# Patient Record
Sex: FEMALE | Race: WHITE | Employment: OTHER | URBAN - METROPOLITAN AREA
[De-identification: names, ages, dates, MRNs, and addresses within clinical notes are randomized per-mention and may not be internally consistent; named-entity substitution may affect disease eponyms.]

---

## 2017-01-12 ENCOUNTER — OFFICE VISIT (OUTPATIENT)
Dept: INTERNAL MEDICINE CLINIC | Age: 67
End: 2017-01-12

## 2017-01-12 VITALS
BODY MASS INDEX: 40.82 KG/M2 | TEMPERATURE: 97.9 F | WEIGHT: 221.8 LBS | SYSTOLIC BLOOD PRESSURE: 140 MMHG | HEIGHT: 62 IN | RESPIRATION RATE: 16 BRPM | DIASTOLIC BLOOD PRESSURE: 80 MMHG | HEART RATE: 80 BPM

## 2017-01-12 DIAGNOSIS — E66.01 MORBID OBESITY WITH BMI OF 40.0-44.9, ADULT (HCC): ICD-10-CM

## 2017-01-12 DIAGNOSIS — E03.9 ACQUIRED HYPOTHYROIDISM: ICD-10-CM

## 2017-01-12 DIAGNOSIS — F51.01 PRIMARY INSOMNIA: ICD-10-CM

## 2017-01-12 DIAGNOSIS — B02.29 POST HERPETIC NEURALGIA: Primary | ICD-10-CM

## 2017-01-12 DIAGNOSIS — J43.2 CENTRILOBULAR EMPHYSEMA (HCC): ICD-10-CM

## 2017-01-12 PROCEDURE — 99213 OFFICE O/P EST LOW 20 MIN: CPT | Performed by: INTERNAL MEDICINE

## 2017-01-12 RX ORDER — LEVOTHYROXINE SODIUM 0.1 MG/1
100 TABLET ORAL DAILY
COMMUNITY
Start: 2017-01-02 | End: 2017-03-21 | Stop reason: SDUPTHER

## 2017-01-12 RX ORDER — ZOLPIDEM TARTRATE 10 MG/1
10 TABLET ORAL NIGHTLY
COMMUNITY
Start: 2016-10-19 | End: 2017-03-30 | Stop reason: SDUPTHER

## 2017-01-12 RX ORDER — GABAPENTIN 300 MG/1
300 CAPSULE ORAL 3 TIMES DAILY
Qty: 90 CAPSULE | Refills: 3 | Status: SHIPPED | OUTPATIENT
Start: 2017-01-12 | End: 2017-03-16 | Stop reason: SDUPTHER

## 2017-01-14 ASSESSMENT — ENCOUNTER SYMPTOMS
CONSTIPATION: 0
COUGH: 1
DIARRHEA: 0
CHEST TIGHTNESS: 0
ABDOMINAL PAIN: 0
NAUSEA: 0
SHORTNESS OF BREATH: 1
VOMITING: 0
WHEEZING: 1

## 2017-01-18 ENCOUNTER — TELEPHONE (OUTPATIENT)
Dept: INTERNAL MEDICINE CLINIC | Age: 67
End: 2017-01-18

## 2017-01-18 RX ORDER — GABAPENTIN 300 MG/1
300 CAPSULE ORAL 3 TIMES DAILY
Qty: 90 CAPSULE | Refills: 3 | Status: SHIPPED | OUTPATIENT
Start: 2017-01-18 | End: 2018-02-12 | Stop reason: SDUPTHER

## 2017-02-16 ENCOUNTER — TELEPHONE (OUTPATIENT)
Dept: INTERNAL MEDICINE CLINIC | Age: 67
End: 2017-02-16

## 2017-02-16 RX ORDER — AMOXICILLIN 875 MG/1
875 TABLET, COATED ORAL 2 TIMES DAILY
Qty: 20 TABLET | Refills: 0 | Status: SHIPPED | OUTPATIENT
Start: 2017-02-16 | End: 2017-02-26

## 2017-02-27 ENCOUNTER — TELEPHONE (OUTPATIENT)
Dept: INTERNAL MEDICINE CLINIC | Age: 67
End: 2017-02-27

## 2017-02-27 RX ORDER — PREDNISONE 10 MG/1
TABLET ORAL
Qty: 22 TABLET | Refills: 0 | Status: SHIPPED | OUTPATIENT
Start: 2017-02-27 | End: 2017-03-09

## 2017-02-27 RX ORDER — LEVOFLOXACIN 500 MG/1
500 TABLET, FILM COATED ORAL DAILY
Qty: 10 TABLET | Refills: 0 | Status: SHIPPED | OUTPATIENT
Start: 2017-02-27 | End: 2017-03-09

## 2017-03-16 ENCOUNTER — OFFICE VISIT (OUTPATIENT)
Dept: INTERNAL MEDICINE CLINIC | Age: 67
End: 2017-03-16

## 2017-03-16 ENCOUNTER — TELEPHONE (OUTPATIENT)
Dept: INTERNAL MEDICINE CLINIC | Age: 67
End: 2017-03-16

## 2017-03-16 VITALS
SYSTOLIC BLOOD PRESSURE: 150 MMHG | BODY MASS INDEX: 40.26 KG/M2 | OXYGEN SATURATION: 93 % | RESPIRATION RATE: 18 BRPM | DIASTOLIC BLOOD PRESSURE: 82 MMHG | TEMPERATURE: 97.7 F | WEIGHT: 216.6 LBS | HEART RATE: 88 BPM

## 2017-03-16 DIAGNOSIS — R45.4 IRRITABILITY: ICD-10-CM

## 2017-03-16 DIAGNOSIS — J44.1 COPD WITH ACUTE EXACERBATION (HCC): Primary | ICD-10-CM

## 2017-03-16 DIAGNOSIS — E05.00 GRAVES DISEASE: ICD-10-CM

## 2017-03-16 DIAGNOSIS — I10 ESSENTIAL HYPERTENSION: ICD-10-CM

## 2017-03-16 DIAGNOSIS — J18.9 PNEUMONIA OF BOTH LUNGS DUE TO INFECTIOUS ORGANISM, UNSPECIFIED PART OF LUNG: ICD-10-CM

## 2017-03-16 LAB
ANION GAP SERPL CALCULATED.3IONS-SCNC: 13 MMOL/L (ref 3–16)
BASOPHILS ABSOLUTE: 0.1 K/UL (ref 0–0.2)
BASOPHILS RELATIVE PERCENT: 0.7 %
BUN BLDV-MCNC: 13 MG/DL (ref 7–20)
CALCIUM SERPL-MCNC: 9.2 MG/DL (ref 8.3–10.6)
CHLORIDE BLD-SCNC: 96 MMOL/L (ref 99–110)
CO2: 31 MMOL/L (ref 21–32)
CREAT SERPL-MCNC: 0.6 MG/DL (ref 0.6–1.2)
EOSINOPHILS ABSOLUTE: 0 K/UL (ref 0–0.6)
EOSINOPHILS RELATIVE PERCENT: 0.2 %
GFR AFRICAN AMERICAN: >60
GFR NON-AFRICAN AMERICAN: >60
GLUCOSE BLD-MCNC: 75 MG/DL (ref 70–99)
HCT VFR BLD CALC: 45.6 % (ref 36–48)
HEMOGLOBIN: 14.3 G/DL (ref 12–16)
LYMPHOCYTES ABSOLUTE: 4.3 K/UL (ref 1–5.1)
LYMPHOCYTES RELATIVE PERCENT: 36.1 %
MCH RBC QN AUTO: 26.6 PG (ref 26–34)
MCHC RBC AUTO-ENTMCNC: 31.4 G/DL (ref 31–36)
MCV RBC AUTO: 84.7 FL (ref 80–100)
MONOCYTES ABSOLUTE: 0.9 K/UL (ref 0–1.3)
MONOCYTES RELATIVE PERCENT: 7.8 %
NEUTROPHILS ABSOLUTE: 6.6 K/UL (ref 1.7–7.7)
NEUTROPHILS RELATIVE PERCENT: 55.2 %
PDW BLD-RTO: 15.3 % (ref 12.4–15.4)
PLATELET # BLD: 307 K/UL (ref 135–450)
PMV BLD AUTO: 7.9 FL (ref 5–10.5)
POTASSIUM SERPL-SCNC: 3.4 MMOL/L (ref 3.5–5.1)
RBC # BLD: 5.38 M/UL (ref 4–5.2)
SODIUM BLD-SCNC: 140 MMOL/L (ref 136–145)
THEOPHYLLINE LEVEL: 8.8 UG/ML (ref 8–20)
WBC # BLD: 12 K/UL (ref 4–11)

## 2017-03-16 PROCEDURE — 36415 COLL VENOUS BLD VENIPUNCTURE: CPT | Performed by: INTERNAL MEDICINE

## 2017-03-16 PROCEDURE — 99214 OFFICE O/P EST MOD 30 MIN: CPT | Performed by: INTERNAL MEDICINE

## 2017-03-16 RX ORDER — PREDNISONE 10 MG/1
10 TABLET ORAL DAILY
COMMUNITY
Start: 2017-03-07 | End: 2017-03-16 | Stop reason: DRUGHIGH

## 2017-03-16 RX ORDER — PREDNISONE 10 MG/1
TABLET ORAL
Qty: 22 TABLET | Refills: 0 | Status: SHIPPED | OUTPATIENT
Start: 2017-03-16 | End: 2017-03-26

## 2017-03-16 RX ORDER — CLINDAMYCIN HYDROCHLORIDE 300 MG/1
300 CAPSULE ORAL 2 TIMES DAILY
Qty: 14 CAPSULE | Refills: 0 | Status: SHIPPED | OUTPATIENT
Start: 2017-03-16 | End: 2017-03-23

## 2017-03-16 RX ORDER — DOXYCYCLINE HYCLATE 100 MG
100 TABLET ORAL 2 TIMES DAILY
Qty: 20 TABLET | Refills: 0 | Status: SHIPPED | OUTPATIENT
Start: 2017-03-16 | End: 2018-08-27 | Stop reason: SDUPTHER

## 2017-03-16 RX ORDER — ALPRAZOLAM 0.25 MG/1
0.25 TABLET ORAL 3 TIMES DAILY PRN
Qty: 30 TABLET | Refills: 0 | Status: SHIPPED | OUTPATIENT
Start: 2017-03-16 | End: 2017-04-19 | Stop reason: SDUPTHER

## 2017-03-19 PROBLEM — I10 ESSENTIAL HYPERTENSION: Status: ACTIVE | Noted: 2017-03-19

## 2017-03-19 ASSESSMENT — ENCOUNTER SYMPTOMS
CHEST TIGHTNESS: 1
NAUSEA: 0
WHEEZING: 1
COUGH: 1
CONSTIPATION: 0
ABDOMINAL PAIN: 0
VOMITING: 0
SHORTNESS OF BREATH: 1
DIARRHEA: 0

## 2017-03-21 DIAGNOSIS — E03.9 ACQUIRED HYPOTHYROIDISM: ICD-10-CM

## 2017-03-21 RX ORDER — LEVOTHYROXINE SODIUM 0.1 MG/1
TABLET ORAL
Qty: 90 TABLET | Refills: 1 | Status: SHIPPED | OUTPATIENT
Start: 2017-03-21 | End: 2017-09-08 | Stop reason: SDUPTHER

## 2017-03-30 ENCOUNTER — OFFICE VISIT (OUTPATIENT)
Dept: INTERNAL MEDICINE CLINIC | Age: 67
End: 2017-03-30

## 2017-03-30 VITALS
RESPIRATION RATE: 16 BRPM | WEIGHT: 213.4 LBS | OXYGEN SATURATION: 97 % | BODY MASS INDEX: 39.67 KG/M2 | DIASTOLIC BLOOD PRESSURE: 70 MMHG | HEART RATE: 88 BPM | SYSTOLIC BLOOD PRESSURE: 134 MMHG | TEMPERATURE: 97.9 F

## 2017-03-30 DIAGNOSIS — K75.81 STEATOHEPATITIS, NON-ALCOHOLIC: ICD-10-CM

## 2017-03-30 DIAGNOSIS — E03.9 ACQUIRED HYPOTHYROIDISM: ICD-10-CM

## 2017-03-30 DIAGNOSIS — J44.1 COPD WITH EXACERBATION (HCC): Primary | ICD-10-CM

## 2017-03-30 DIAGNOSIS — E09.9 STEROID-INDUCED DIABETES (HCC): ICD-10-CM

## 2017-03-30 DIAGNOSIS — T38.0X5A STEROID-INDUCED DIABETES (HCC): ICD-10-CM

## 2017-03-30 DIAGNOSIS — F51.01 PRIMARY INSOMNIA: ICD-10-CM

## 2017-03-30 LAB
A/G RATIO: 1.6 (ref 1.1–2.2)
ALBUMIN SERPL-MCNC: 3.9 G/DL (ref 3.4–5)
ALP BLD-CCNC: 73 U/L (ref 40–129)
ALT SERPL-CCNC: 20 U/L (ref 10–40)
ANION GAP SERPL CALCULATED.3IONS-SCNC: 14 MMOL/L (ref 3–16)
AST SERPL-CCNC: 19 U/L (ref 15–37)
BILIRUB SERPL-MCNC: 0.7 MG/DL (ref 0–1)
BUN BLDV-MCNC: 8 MG/DL (ref 7–20)
CALCIUM SERPL-MCNC: 9.2 MG/DL (ref 8.3–10.6)
CHLORIDE BLD-SCNC: 99 MMOL/L (ref 99–110)
CO2: 27 MMOL/L (ref 21–32)
CREAT SERPL-MCNC: 0.6 MG/DL (ref 0.6–1.2)
GFR AFRICAN AMERICAN: >60
GFR NON-AFRICAN AMERICAN: >60
GLOBULIN: 2.4 G/DL
GLUCOSE BLD-MCNC: 88 MG/DL (ref 70–99)
POTASSIUM SERPL-SCNC: 4.2 MMOL/L (ref 3.5–5.1)
SODIUM BLD-SCNC: 140 MMOL/L (ref 136–145)
T4 FREE: 1.5 NG/DL (ref 0.9–1.8)
TOTAL PROTEIN: 6.3 G/DL (ref 6.4–8.2)
TSH SERPL DL<=0.05 MIU/L-ACNC: 0.77 UIU/ML (ref 0.27–4.2)

## 2017-03-30 PROCEDURE — 99214 OFFICE O/P EST MOD 30 MIN: CPT | Performed by: INTERNAL MEDICINE

## 2017-03-30 PROCEDURE — 36415 COLL VENOUS BLD VENIPUNCTURE: CPT | Performed by: INTERNAL MEDICINE

## 2017-03-30 RX ORDER — ZOLPIDEM TARTRATE 10 MG/1
10 TABLET ORAL NIGHTLY
Qty: 90 TABLET | Refills: 0 | Status: SHIPPED | OUTPATIENT
Start: 2017-03-30 | End: 2017-06-22 | Stop reason: SDUPTHER

## 2017-03-30 RX ORDER — THEOPHYLLINE 300 MG/1
300 TABLET, EXTENDED RELEASE ORAL 2 TIMES DAILY
COMMUNITY
Start: 2017-03-14 | End: 2021-11-27

## 2017-03-30 RX ORDER — ZOLPIDEM TARTRATE 10 MG/1
10 TABLET ORAL NIGHTLY
Qty: 30 TABLET | Refills: 0 | Status: SHIPPED | OUTPATIENT
Start: 2017-03-30 | End: 2017-05-25 | Stop reason: CLARIF

## 2017-03-30 RX ORDER — PREDNISONE 10 MG/1
10 TABLET ORAL EVERY OTHER DAY
COMMUNITY
Start: 2017-03-16 | End: 2017-05-25 | Stop reason: ALTCHOICE

## 2017-04-02 ASSESSMENT — ENCOUNTER SYMPTOMS
VOMITING: 0
CHEST TIGHTNESS: 1
ABDOMINAL PAIN: 0
COUGH: 1
DIARRHEA: 0
NAUSEA: 0
CONSTIPATION: 0
SHORTNESS OF BREATH: 1
WHEEZING: 1

## 2017-04-18 ENCOUNTER — TELEPHONE (OUTPATIENT)
Dept: INTERNAL MEDICINE CLINIC | Age: 67
End: 2017-04-18

## 2017-04-18 DIAGNOSIS — R45.4 IRRITABILITY: ICD-10-CM

## 2017-04-19 RX ORDER — ALPRAZOLAM 0.25 MG/1
0.25 TABLET ORAL 3 TIMES DAILY PRN
Qty: 30 TABLET | Refills: 0 | Status: SHIPPED | OUTPATIENT
Start: 2017-04-19 | End: 2017-05-19

## 2017-05-08 ENCOUNTER — TELEPHONE (OUTPATIENT)
Dept: INTERNAL MEDICINE CLINIC | Age: 67
End: 2017-05-08

## 2017-05-08 RX ORDER — AMOXICILLIN 875 MG/1
875 TABLET, COATED ORAL 2 TIMES DAILY
Qty: 20 TABLET | Refills: 0 | Status: SHIPPED | OUTPATIENT
Start: 2017-05-08 | End: 2017-05-25 | Stop reason: ALTCHOICE

## 2017-05-25 ENCOUNTER — OFFICE VISIT (OUTPATIENT)
Dept: INTERNAL MEDICINE CLINIC | Age: 67
End: 2017-05-25

## 2017-05-25 VITALS
TEMPERATURE: 97.9 F | DIASTOLIC BLOOD PRESSURE: 72 MMHG | HEART RATE: 100 BPM | BODY MASS INDEX: 39.82 KG/M2 | OXYGEN SATURATION: 94 % | WEIGHT: 214.2 LBS | SYSTOLIC BLOOD PRESSURE: 136 MMHG | RESPIRATION RATE: 16 BRPM

## 2017-05-25 DIAGNOSIS — F41.9 ANXIETY AND DEPRESSION: ICD-10-CM

## 2017-05-25 DIAGNOSIS — J44.9 CHRONIC OBSTRUCTIVE PULMONARY DISEASE, UNSPECIFIED COPD TYPE (HCC): Primary | ICD-10-CM

## 2017-05-25 DIAGNOSIS — F32.A ANXIETY AND DEPRESSION: ICD-10-CM

## 2017-05-25 DIAGNOSIS — J30.2 SEASONAL ALLERGIC RHINITIS, UNSPECIFIED ALLERGIC RHINITIS TRIGGER: ICD-10-CM

## 2017-05-25 PROCEDURE — 99213 OFFICE O/P EST LOW 20 MIN: CPT | Performed by: INTERNAL MEDICINE

## 2017-05-25 RX ORDER — CITALOPRAM 20 MG/1
20 TABLET ORAL DAILY
Qty: 90 TABLET | Refills: 1 | Status: SHIPPED | OUTPATIENT
Start: 2017-05-25 | End: 2017-05-26 | Stop reason: SDUPTHER

## 2017-05-26 RX ORDER — CITALOPRAM 20 MG/1
20 TABLET ORAL DAILY
Qty: 90 TABLET | Refills: 1 | Status: SHIPPED | OUTPATIENT
Start: 2017-05-26 | End: 2018-01-18 | Stop reason: SDUPTHER

## 2017-06-02 ASSESSMENT — ENCOUNTER SYMPTOMS
DIARRHEA: 0
VOMITING: 0
CONSTIPATION: 0
ABDOMINAL PAIN: 0
SHORTNESS OF BREATH: 1
CHEST TIGHTNESS: 1
NAUSEA: 0
WHEEZING: 1
COUGH: 1

## 2017-06-09 ENCOUNTER — TELEPHONE (OUTPATIENT)
Dept: INTERNAL MEDICINE CLINIC | Age: 67
End: 2017-06-09

## 2017-06-12 ENCOUNTER — OFFICE VISIT (OUTPATIENT)
Dept: INTERNAL MEDICINE CLINIC | Age: 67
End: 2017-06-12

## 2017-06-12 VITALS
DIASTOLIC BLOOD PRESSURE: 72 MMHG | WEIGHT: 213.4 LBS | RESPIRATION RATE: 16 BRPM | OXYGEN SATURATION: 98 % | BODY MASS INDEX: 39.67 KG/M2 | SYSTOLIC BLOOD PRESSURE: 132 MMHG | HEART RATE: 92 BPM | TEMPERATURE: 97.6 F

## 2017-06-12 DIAGNOSIS — R42 VERTIGO: Primary | ICD-10-CM

## 2017-06-12 DIAGNOSIS — I10 ESSENTIAL HYPERTENSION: ICD-10-CM

## 2017-06-12 DIAGNOSIS — J44.9 CHRONIC OBSTRUCTIVE PULMONARY DISEASE, UNSPECIFIED COPD TYPE (HCC): ICD-10-CM

## 2017-06-12 DIAGNOSIS — H65.04 RECURRENT ACUTE SEROUS OTITIS MEDIA OF RIGHT EAR: ICD-10-CM

## 2017-06-12 PROCEDURE — 99213 OFFICE O/P EST LOW 20 MIN: CPT | Performed by: INTERNAL MEDICINE

## 2017-06-12 RX ORDER — AMOXICILLIN 875 MG/1
875 TABLET, COATED ORAL 2 TIMES DAILY
Qty: 20 TABLET | Refills: 0 | Status: SHIPPED | OUTPATIENT
Start: 2017-06-12 | End: 2017-06-22

## 2017-06-12 RX ORDER — FEXOFENADINE HCL 180 MG/1
180 TABLET ORAL DAILY
Qty: 30 TABLET | Refills: 1 | Status: SHIPPED | COMMUNITY
Start: 2017-06-12 | End: 2018-08-27 | Stop reason: SDUPTHER

## 2017-06-13 ASSESSMENT — ENCOUNTER SYMPTOMS
RHINORRHEA: 1
SINUS PRESSURE: 1
COUGH: 1
SHORTNESS OF BREATH: 1

## 2017-06-19 ENCOUNTER — TELEPHONE (OUTPATIENT)
Dept: INTERNAL MEDICINE CLINIC | Age: 67
End: 2017-06-19

## 2017-06-19 RX ORDER — LEVOFLOXACIN 750 MG/1
750 TABLET ORAL DAILY
Qty: 5 TABLET | Refills: 1 | Status: SHIPPED | OUTPATIENT
Start: 2017-06-19 | End: 2017-07-27 | Stop reason: ALTCHOICE

## 2017-06-22 DIAGNOSIS — F51.01 PRIMARY INSOMNIA: ICD-10-CM

## 2017-06-22 RX ORDER — ZOLPIDEM TARTRATE 10 MG/1
10 TABLET ORAL NIGHTLY
Qty: 90 TABLET | Refills: 0 | Status: SHIPPED | OUTPATIENT
Start: 2017-06-22 | End: 2017-09-19 | Stop reason: SDUPTHER

## 2017-06-29 ENCOUNTER — OFFICE VISIT (OUTPATIENT)
Dept: INTERNAL MEDICINE CLINIC | Age: 67
End: 2017-06-29

## 2017-06-29 VITALS
OXYGEN SATURATION: 91 % | DIASTOLIC BLOOD PRESSURE: 80 MMHG | BODY MASS INDEX: 38.83 KG/M2 | WEIGHT: 211 LBS | HEIGHT: 62 IN | TEMPERATURE: 98.4 F | HEART RATE: 91 BPM | SYSTOLIC BLOOD PRESSURE: 130 MMHG

## 2017-06-29 DIAGNOSIS — J44.9 CHRONIC OBSTRUCTIVE PULMONARY DISEASE, UNSPECIFIED COPD TYPE (HCC): Primary | ICD-10-CM

## 2017-06-29 DIAGNOSIS — J30.2 SEASONAL ALLERGIC RHINITIS, UNSPECIFIED ALLERGIC RHINITIS TRIGGER: ICD-10-CM

## 2017-06-29 PROCEDURE — 99213 OFFICE O/P EST LOW 20 MIN: CPT | Performed by: INTERNAL MEDICINE

## 2017-06-29 RX ORDER — PREDNISONE 10 MG/1
TABLET ORAL
Qty: 22 TABLET | Refills: 0 | Status: SHIPPED | OUTPATIENT
Start: 2017-06-29 | End: 2017-07-09

## 2017-07-02 ASSESSMENT — ENCOUNTER SYMPTOMS
NAUSEA: 0
SORE THROAT: 1
RHINORRHEA: 1
COUGH: 1
WHEEZING: 1
SINUS PRESSURE: 1
SHORTNESS OF BREATH: 1
ABDOMINAL PAIN: 0
VOMITING: 0
DIARRHEA: 0

## 2017-07-19 ENCOUNTER — TELEPHONE (OUTPATIENT)
Dept: INTERNAL MEDICINE CLINIC | Age: 67
End: 2017-07-19

## 2017-07-19 RX ORDER — AMOXICILLIN 875 MG/1
875 TABLET, COATED ORAL 2 TIMES DAILY
Qty: 20 TABLET | Refills: 0 | Status: SHIPPED | OUTPATIENT
Start: 2017-07-19 | End: 2017-09-19 | Stop reason: ALTCHOICE

## 2017-07-19 RX ORDER — PREDNISONE 10 MG/1
TABLET ORAL
Qty: 22 TABLET | Refills: 0 | Status: SHIPPED | OUTPATIENT
Start: 2017-07-19 | End: 2017-07-29

## 2017-07-27 ENCOUNTER — OFFICE VISIT (OUTPATIENT)
Dept: INTERNAL MEDICINE CLINIC | Age: 67
End: 2017-07-27

## 2017-07-27 VITALS
HEIGHT: 61 IN | HEART RATE: 81 BPM | TEMPERATURE: 98 F | DIASTOLIC BLOOD PRESSURE: 70 MMHG | BODY MASS INDEX: 39.46 KG/M2 | SYSTOLIC BLOOD PRESSURE: 130 MMHG | WEIGHT: 209 LBS

## 2017-07-27 DIAGNOSIS — H53.2 DIPLOPIA: ICD-10-CM

## 2017-07-27 DIAGNOSIS — I10 ESSENTIAL HYPERTENSION: ICD-10-CM

## 2017-07-27 DIAGNOSIS — Z01.818 PRE-OP EXAM: Primary | ICD-10-CM

## 2017-07-27 DIAGNOSIS — E05.00 GRAVES DISEASE: ICD-10-CM

## 2017-07-27 DIAGNOSIS — Z13.220 SCREENING CHOLESTEROL LEVEL: ICD-10-CM

## 2017-07-27 DIAGNOSIS — J44.9 COPD, SEVERE (HCC): ICD-10-CM

## 2017-07-27 LAB
A/G RATIO: 1.5 (ref 1.1–2.2)
ALBUMIN SERPL-MCNC: 4.2 G/DL (ref 3.4–5)
ALP BLD-CCNC: 74 U/L (ref 40–129)
ALT SERPL-CCNC: 18 U/L (ref 10–40)
ANION GAP SERPL CALCULATED.3IONS-SCNC: 15 MMOL/L (ref 3–16)
AST SERPL-CCNC: 18 U/L (ref 15–37)
BILIRUB SERPL-MCNC: 0.6 MG/DL (ref 0–1)
BUN BLDV-MCNC: 12 MG/DL (ref 7–20)
CALCIUM SERPL-MCNC: 9.6 MG/DL (ref 8.3–10.6)
CHLORIDE BLD-SCNC: 100 MMOL/L (ref 99–110)
CHOLESTEROL, TOTAL: 216 MG/DL (ref 0–199)
CO2: 27 MMOL/L (ref 21–32)
CREAT SERPL-MCNC: 0.6 MG/DL (ref 0.6–1.2)
GFR AFRICAN AMERICAN: >60
GFR NON-AFRICAN AMERICAN: >60
GLOBULIN: 2.8 G/DL
GLUCOSE BLD-MCNC: 77 MG/DL (ref 70–99)
HCT VFR BLD CALC: 47.8 % (ref 36–48)
HDLC SERPL-MCNC: 79 MG/DL (ref 40–60)
HEMOGLOBIN: 15.3 G/DL (ref 12–16)
LDL CHOLESTEROL CALCULATED: 105 MG/DL
MCH RBC QN AUTO: 28.1 PG (ref 26–34)
MCHC RBC AUTO-ENTMCNC: 32.1 G/DL (ref 31–36)
MCV RBC AUTO: 87.5 FL (ref 80–100)
PDW BLD-RTO: 14.9 % (ref 12.4–15.4)
PLATELET # BLD: 316 K/UL (ref 135–450)
PMV BLD AUTO: 8.6 FL (ref 5–10.5)
POTASSIUM SERPL-SCNC: 4.2 MMOL/L (ref 3.5–5.1)
RBC # BLD: 5.46 M/UL (ref 4–5.2)
SODIUM BLD-SCNC: 142 MMOL/L (ref 136–145)
THEOPHYLLINE LEVEL: 5.7 UG/ML (ref 8–20)
TOTAL PROTEIN: 7 G/DL (ref 6.4–8.2)
TRIGL SERPL-MCNC: 161 MG/DL (ref 0–150)
VLDLC SERPL CALC-MCNC: 32 MG/DL
WBC # BLD: 12.9 K/UL (ref 4–11)

## 2017-07-27 PROCEDURE — 93000 ELECTROCARDIOGRAM COMPLETE: CPT | Performed by: INTERNAL MEDICINE

## 2017-07-27 PROCEDURE — 99213 OFFICE O/P EST LOW 20 MIN: CPT | Performed by: INTERNAL MEDICINE

## 2017-07-27 PROCEDURE — 36415 COLL VENOUS BLD VENIPUNCTURE: CPT | Performed by: INTERNAL MEDICINE

## 2017-07-27 ASSESSMENT — ENCOUNTER SYMPTOMS
ABDOMINAL PAIN: 0
EYE DISCHARGE: 0
APNEA: 0
VOMITING: 0
BLOOD IN STOOL: 0
EYE REDNESS: 0
PHOTOPHOBIA: 0
EYE PAIN: 0
NAUSEA: 0
TROUBLE SWALLOWING: 0
CONSTIPATION: 0
EYE ITCHING: 0
WHEEZING: 1
DIARRHEA: 0
BACK PAIN: 0
CHEST TIGHTNESS: 0
COUGH: 1
SHORTNESS OF BREATH: 1

## 2017-07-28 ENCOUNTER — TELEPHONE (OUTPATIENT)
Dept: INTERNAL MEDICINE CLINIC | Age: 67
End: 2017-07-28

## 2017-08-01 ENCOUNTER — TELEPHONE (OUTPATIENT)
Dept: INTERNAL MEDICINE CLINIC | Age: 67
End: 2017-08-01

## 2017-09-08 DIAGNOSIS — E03.9 ACQUIRED HYPOTHYROIDISM: ICD-10-CM

## 2017-09-11 RX ORDER — LEVOTHYROXINE SODIUM 0.1 MG/1
TABLET ORAL
Qty: 90 TABLET | Refills: 1 | Status: SHIPPED | OUTPATIENT
Start: 2017-09-11 | End: 2018-03-12 | Stop reason: SDUPTHER

## 2017-09-18 ENCOUNTER — TELEPHONE (OUTPATIENT)
Dept: INTERNAL MEDICINE CLINIC | Age: 67
End: 2017-09-18

## 2017-09-18 RX ORDER — PREDNISONE 20 MG/1
TABLET ORAL
Qty: 20 TABLET | Refills: 0 | Status: SHIPPED | OUTPATIENT
Start: 2017-09-18 | End: 2017-09-25 | Stop reason: DRUGHIGH

## 2017-09-19 ENCOUNTER — OFFICE VISIT (OUTPATIENT)
Dept: INTERNAL MEDICINE CLINIC | Age: 67
End: 2017-09-19

## 2017-09-19 VITALS
OXYGEN SATURATION: 93 % | WEIGHT: 208 LBS | SYSTOLIC BLOOD PRESSURE: 106 MMHG | DIASTOLIC BLOOD PRESSURE: 60 MMHG | HEART RATE: 92 BPM | BODY MASS INDEX: 38.98 KG/M2 | TEMPERATURE: 97.8 F | RESPIRATION RATE: 24 BRPM

## 2017-09-19 DIAGNOSIS — F51.01 PRIMARY INSOMNIA: ICD-10-CM

## 2017-09-19 DIAGNOSIS — J44.1 COPD EXACERBATION (HCC): Primary | ICD-10-CM

## 2017-09-19 PROCEDURE — 99213 OFFICE O/P EST LOW 20 MIN: CPT | Performed by: INTERNAL MEDICINE

## 2017-09-19 RX ORDER — ZOLPIDEM TARTRATE 10 MG/1
10 TABLET ORAL NIGHTLY
Qty: 90 TABLET | Refills: 0 | Status: SHIPPED | OUTPATIENT
Start: 2017-09-19 | End: 2017-12-21 | Stop reason: SDUPTHER

## 2017-09-19 RX ORDER — ALPRAZOLAM 0.25 MG/1
0.25 TABLET ORAL 2 TIMES DAILY PRN
Qty: 20 TABLET | Refills: 0 | Status: SHIPPED | OUTPATIENT
Start: 2017-09-19 | End: 2018-01-18 | Stop reason: SDUPTHER

## 2017-09-19 RX ORDER — LEVOFLOXACIN 500 MG/1
500 TABLET, FILM COATED ORAL DAILY
Qty: 10 TABLET | Refills: 0 | Status: SHIPPED | OUTPATIENT
Start: 2017-09-19 | End: 2017-09-29

## 2017-09-20 ASSESSMENT — ENCOUNTER SYMPTOMS
NAUSEA: 0
CONSTIPATION: 0
SHORTNESS OF BREATH: 1
ABDOMINAL PAIN: 0
WHEEZING: 1
COUGH: 1
VOMITING: 0
DIARRHEA: 0

## 2017-09-25 ENCOUNTER — TELEPHONE (OUTPATIENT)
Dept: INTERNAL MEDICINE CLINIC | Age: 67
End: 2017-09-25

## 2017-09-25 RX ORDER — PREDNISONE 20 MG/1
TABLET ORAL
Qty: 20 TABLET | Refills: 0 | Status: SHIPPED | OUTPATIENT
Start: 2017-09-25 | End: 2018-01-08 | Stop reason: ALTCHOICE

## 2017-09-25 NOTE — TELEPHONE ENCOUNTER
Pt was prescribed prednisone and she only has 1 pill left. Pt feeling a little better but she still has a cough. Pt is still taking the levaquin. Pt wants to know if a refill of prednisone can [facility] in [place].

## 2017-09-25 NOTE — TELEPHONE ENCOUNTER
Prescription sent in and will continue taper. 2 due to pills daily for 5 days one pill daily for 5 days and a half pill daily for 4-5 days.  If more trouble to call us

## 2017-12-21 ENCOUNTER — TELEPHONE (OUTPATIENT)
Dept: INTERNAL MEDICINE CLINIC | Age: 67
End: 2017-12-21

## 2017-12-21 DIAGNOSIS — F51.01 PRIMARY INSOMNIA: ICD-10-CM

## 2017-12-21 RX ORDER — ZOLPIDEM TARTRATE 10 MG/1
10 TABLET ORAL NIGHTLY
Qty: 90 TABLET | Refills: 0 | Status: SHIPPED | OUTPATIENT
Start: 2017-12-21 | End: 2018-03-12 | Stop reason: SDUPTHER

## 2017-12-21 NOTE — TELEPHONE ENCOUNTER
Prescriptions sent to her mail order however before refills again would be due for a visit and should probably schedule

## 2018-01-03 ENCOUNTER — TELEPHONE (OUTPATIENT)
Dept: INTERNAL MEDICINE CLINIC | Age: 68
End: 2018-01-03

## 2018-01-03 DIAGNOSIS — R09.89 CHEST CONGESTION: Primary | ICD-10-CM

## 2018-01-03 DIAGNOSIS — R50.9 FEVER, UNSPECIFIED FEVER CAUSE: ICD-10-CM

## 2018-01-03 DIAGNOSIS — R05.9 COUGH: ICD-10-CM

## 2018-01-03 RX ORDER — AMOXICILLIN AND CLAVULANATE POTASSIUM 875; 125 MG/1; MG/1
1 TABLET, FILM COATED ORAL 2 TIMES DAILY
Qty: 20 TABLET | Refills: 0 | Status: SHIPPED | OUTPATIENT
Start: 2018-01-03 | End: 2018-01-13

## 2018-01-03 RX ORDER — OSELTAMIVIR PHOSPHATE 75 MG/1
75 CAPSULE ORAL 2 TIMES DAILY
Qty: 10 CAPSULE | Refills: 0 | Status: SHIPPED | OUTPATIENT
Start: 2018-01-03 | End: 2018-01-13

## 2018-01-03 NOTE — TELEPHONE ENCOUNTER
Breathing is fair, using nebulizer, fever of 99, coughing a lot, she was around her son on Sun and on Monday he was dx with the Flu BHerberth Mauro is asking if you could send in prednisone for her also.

## 2018-01-04 ENCOUNTER — TELEPHONE (OUTPATIENT)
Dept: INTERNAL MEDICINE CLINIC | Age: 68
End: 2018-01-04

## 2018-01-04 DIAGNOSIS — R50.9 FEVER, UNSPECIFIED FEVER CAUSE: ICD-10-CM

## 2018-01-04 DIAGNOSIS — R09.89 CHEST CONGESTION: ICD-10-CM

## 2018-01-04 DIAGNOSIS — R05.9 COUGH: ICD-10-CM

## 2018-01-04 NOTE — TELEPHONE ENCOUNTER
Spoke with the patient and she state that she's feeling drained, sob,has a headache, sore throat, coughing, is using her nebulizer. Sleeping in a recliner, does better when not lying flat. Taking the tamiflu and augmentin.

## 2018-01-08 ENCOUNTER — TELEPHONE (OUTPATIENT)
Dept: INTERNAL MEDICINE CLINIC | Age: 68
End: 2018-01-08

## 2018-01-08 RX ORDER — PREDNISONE 20 MG/1
20 TABLET ORAL DAILY
Qty: 10 TABLET | Refills: 0 | Status: SHIPPED | OUTPATIENT
Start: 2018-01-08 | End: 2018-01-18 | Stop reason: ALTCHOICE

## 2018-01-08 RX ORDER — LEVOFLOXACIN 500 MG/1
500 TABLET, FILM COATED ORAL DAILY
Qty: 10 TABLET | Refills: 0 | Status: SHIPPED | OUTPATIENT
Start: 2018-01-08 | End: 2018-01-18 | Stop reason: ALTCHOICE

## 2018-01-08 NOTE — TELEPHONE ENCOUNTER
Pt has a fever,body aches,hard time breathing,dizzy,no energy and cough. Pt is using his nebulizer. Pt is on augmentin but Bhakti Peralta wants to know if more meds can be sent to [address] [facility] in [place]. The tamiflu is too expensive.

## 2018-01-08 NOTE — TELEPHONE ENCOUNTER
Levaquin and steroids sent in. If persisting problems see. With lung disease if bad to go to the ER.  Probably too late for viral treatment anyway

## 2018-01-15 ENCOUNTER — TELEPHONE (OUTPATIENT)
Dept: INTERNAL MEDICINE CLINIC | Age: 68
End: 2018-01-15

## 2018-01-15 DIAGNOSIS — J44.1 COPD WITH ACUTE EXACERBATION (HCC): Primary | ICD-10-CM

## 2018-01-15 DIAGNOSIS — J44.1 COPD WITH ACUTE EXACERBATION (HCC): ICD-10-CM

## 2018-01-15 NOTE — TELEPHONE ENCOUNTER
ANTONIO advised. Does not think pt needs to go to ED, sob isn't much worse just isn't getting any better. Will try to have cxr done this afternoon.

## 2018-01-15 NOTE — TELEPHONE ENCOUNTER
----- Message from Gely Newton MD sent at 1/15/2018  3:40 PM EST -----  Chest x-ray shows no acute process.  If not improving should be seen

## 2018-01-15 NOTE — TELEPHONE ENCOUNTER
Bren Castro (/on hipaa) calling b/c pt had the flu and is still having a hard time breathing. Bren Castro wants to know if Dr Nella Harding can order a chest xray on pt. Pt will get the xray done [facility]. Pl advise.

## 2018-01-18 ENCOUNTER — OFFICE VISIT (OUTPATIENT)
Dept: INTERNAL MEDICINE CLINIC | Age: 68
End: 2018-01-18

## 2018-01-18 VITALS
BODY MASS INDEX: 39.5 KG/M2 | SYSTOLIC BLOOD PRESSURE: 136 MMHG | DIASTOLIC BLOOD PRESSURE: 84 MMHG | OXYGEN SATURATION: 97 % | HEART RATE: 81 BPM | TEMPERATURE: 97.6 F | WEIGHT: 209.2 LBS | HEIGHT: 61 IN

## 2018-01-18 DIAGNOSIS — J10.1 INFLUENZA B: ICD-10-CM

## 2018-01-18 DIAGNOSIS — R05.3 COUGH, PERSISTENT: ICD-10-CM

## 2018-01-18 DIAGNOSIS — J44.1 ACUTE EXACERBATION OF CHRONIC OBSTRUCTIVE PULMONARY DISEASE (COPD) (HCC): Primary | ICD-10-CM

## 2018-01-18 DIAGNOSIS — I10 ESSENTIAL HYPERTENSION: ICD-10-CM

## 2018-01-18 DIAGNOSIS — F51.01 PRIMARY INSOMNIA: ICD-10-CM

## 2018-01-18 DIAGNOSIS — R73.03 PRE-DIABETES: ICD-10-CM

## 2018-01-18 DIAGNOSIS — F32.A ANXIETY AND DEPRESSION: ICD-10-CM

## 2018-01-18 DIAGNOSIS — F41.9 ANXIETY AND DEPRESSION: ICD-10-CM

## 2018-01-18 LAB
A/G RATIO: 1.6 (ref 1.1–2.2)
ALBUMIN SERPL-MCNC: 4.1 G/DL (ref 3.4–5)
ALP BLD-CCNC: 63 U/L (ref 40–129)
ALT SERPL-CCNC: 21 U/L (ref 10–40)
ANION GAP SERPL CALCULATED.3IONS-SCNC: 14 MMOL/L (ref 3–16)
AST SERPL-CCNC: 17 U/L (ref 15–37)
BASOPHILS ABSOLUTE: 0.1 K/UL (ref 0–0.2)
BASOPHILS RELATIVE PERCENT: 1 %
BILIRUB SERPL-MCNC: 0.7 MG/DL (ref 0–1)
BUN BLDV-MCNC: 12 MG/DL (ref 7–20)
CALCIUM SERPL-MCNC: 9.8 MG/DL (ref 8.3–10.6)
CHLORIDE BLD-SCNC: 97 MMOL/L (ref 99–110)
CO2: 29 MMOL/L (ref 21–32)
CREAT SERPL-MCNC: 0.6 MG/DL (ref 0.6–1.2)
EOSINOPHILS ABSOLUTE: 0 K/UL (ref 0–0.6)
EOSINOPHILS RELATIVE PERCENT: 0.3 %
GFR AFRICAN AMERICAN: >60
GFR NON-AFRICAN AMERICAN: >60
GLOBULIN: 2.6 G/DL
GLUCOSE BLD-MCNC: 75 MG/DL (ref 70–99)
HCT VFR BLD CALC: 46.1 % (ref 36–48)
HEMOGLOBIN: 15 G/DL (ref 12–16)
LYMPHOCYTES ABSOLUTE: 4.2 K/UL (ref 1–5.1)
LYMPHOCYTES RELATIVE PERCENT: 36.5 %
MCH RBC QN AUTO: 29.1 PG (ref 26–34)
MCHC RBC AUTO-ENTMCNC: 32.6 G/DL (ref 31–36)
MCV RBC AUTO: 89.4 FL (ref 80–100)
MONOCYTES ABSOLUTE: 0.9 K/UL (ref 0–1.3)
MONOCYTES RELATIVE PERCENT: 7.4 %
NEUTROPHILS ABSOLUTE: 6.4 K/UL (ref 1.7–7.7)
NEUTROPHILS RELATIVE PERCENT: 54.8 %
PDW BLD-RTO: 14.1 % (ref 12.4–15.4)
PLATELET # BLD: 332 K/UL (ref 135–450)
PMV BLD AUTO: 8.2 FL (ref 5–10.5)
POTASSIUM SERPL-SCNC: 4 MMOL/L (ref 3.5–5.1)
RBC # BLD: 5.16 M/UL (ref 4–5.2)
SODIUM BLD-SCNC: 140 MMOL/L (ref 136–145)
THEOPHYLLINE LEVEL: 4.6 UG/ML (ref 8–20)
TOTAL PROTEIN: 6.7 G/DL (ref 6.4–8.2)
WBC # BLD: 11.6 K/UL (ref 4–11)

## 2018-01-18 PROCEDURE — G8484 FLU IMMUNIZE NO ADMIN: HCPCS | Performed by: INTERNAL MEDICINE

## 2018-01-18 PROCEDURE — 1123F ACP DISCUSS/DSCN MKR DOCD: CPT | Performed by: INTERNAL MEDICINE

## 2018-01-18 PROCEDURE — G8427 DOCREV CUR MEDS BY ELIG CLIN: HCPCS | Performed by: INTERNAL MEDICINE

## 2018-01-18 PROCEDURE — 3014F SCREEN MAMMO DOC REV: CPT | Performed by: INTERNAL MEDICINE

## 2018-01-18 PROCEDURE — G8400 PT W/DXA NO RESULTS DOC: HCPCS | Performed by: INTERNAL MEDICINE

## 2018-01-18 PROCEDURE — 1036F TOBACCO NON-USER: CPT | Performed by: INTERNAL MEDICINE

## 2018-01-18 PROCEDURE — G8417 CALC BMI ABV UP PARAM F/U: HCPCS | Performed by: INTERNAL MEDICINE

## 2018-01-18 PROCEDURE — 99214 OFFICE O/P EST MOD 30 MIN: CPT | Performed by: INTERNAL MEDICINE

## 2018-01-18 PROCEDURE — G8926 SPIRO NO PERF OR DOC: HCPCS | Performed by: INTERNAL MEDICINE

## 2018-01-18 PROCEDURE — 4040F PNEUMOC VAC/ADMIN/RCVD: CPT | Performed by: INTERNAL MEDICINE

## 2018-01-18 PROCEDURE — 1090F PRES/ABSN URINE INCON ASSESS: CPT | Performed by: INTERNAL MEDICINE

## 2018-01-18 PROCEDURE — 3017F COLORECTAL CA SCREEN DOC REV: CPT | Performed by: INTERNAL MEDICINE

## 2018-01-18 PROCEDURE — 3023F SPIROM DOC REV: CPT | Performed by: INTERNAL MEDICINE

## 2018-01-18 PROCEDURE — 36415 COLL VENOUS BLD VENIPUNCTURE: CPT | Performed by: INTERNAL MEDICINE

## 2018-01-18 RX ORDER — ALPRAZOLAM 0.25 MG/1
0.25 TABLET ORAL 2 TIMES DAILY PRN
Qty: 20 TABLET | Refills: 0 | Status: SHIPPED | OUTPATIENT
Start: 2018-01-18 | End: 2018-08-28 | Stop reason: SDUPTHER

## 2018-01-18 RX ORDER — CITALOPRAM 20 MG/1
20 TABLET ORAL DAILY
Qty: 90 TABLET | Refills: 1 | Status: SHIPPED | OUTPATIENT
Start: 2018-01-18 | End: 2018-02-19 | Stop reason: SDUPTHER

## 2018-01-18 RX ORDER — DOXYCYCLINE 100 MG/1
100 CAPSULE ORAL 2 TIMES DAILY
Qty: 20 CAPSULE | Refills: 0 | Status: SHIPPED | OUTPATIENT
Start: 2018-01-18 | End: 2018-02-19 | Stop reason: ALTCHOICE

## 2018-01-18 RX ORDER — PREDNISONE 20 MG/1
TABLET ORAL
Qty: 15 TABLET | Refills: 0 | Status: SHIPPED | OUTPATIENT
Start: 2018-01-18 | End: 2018-02-19 | Stop reason: ALTCHOICE

## 2018-01-18 NOTE — PROGRESS NOTES
day for 2 days, 1 a day for 2 days and 1/2 daily for 2 days     Dispense:  15 tablet     Refill:  0    ALPRAZolam (XANAX) 0.25 MG tablet     Sig: Take 1 tablet by mouth 2 times daily as needed for Sleep for up to 30 days. Dispense:  20 tablet     Refill:  0    citalopram (CELEXA) 20 MG tablet     Sig: Take 1 tablet by mouth daily     Dispense:  90 tablet     Refill:  1        Return in about 6 months (around 7/18/2018), or if symptoms worsen or fail to improve. There are no Patient Instructions on file for this visit.

## 2018-01-19 LAB
ESTIMATED AVERAGE GLUCOSE: 122.6 MG/DL
HBA1C MFR BLD: 5.9 %

## 2018-01-22 ASSESSMENT — ENCOUNTER SYMPTOMS
SORE THROAT: 1
COUGH: 1
NAUSEA: 0
DIARRHEA: 0
VOMITING: 0
ABDOMINAL PAIN: 0
SHORTNESS OF BREATH: 1
WHEEZING: 1

## 2018-01-25 ENCOUNTER — HOSPITAL ENCOUNTER (OUTPATIENT)
Dept: CT IMAGING | Age: 68
Discharge: HOME | End: 2018-01-25
Admitting: INTERNAL MEDICINE

## 2018-01-25 DIAGNOSIS — J44.1 ACUTE EXACERBATION OF CHRONIC OBSTRUCTIVE PULMONARY DISEASE (COPD) (HCC): ICD-10-CM

## 2018-01-25 DIAGNOSIS — R05.3 COUGH, PERSISTENT: ICD-10-CM

## 2018-01-25 DIAGNOSIS — J10.1 INFLUENZA B: ICD-10-CM

## 2018-02-12 RX ORDER — GABAPENTIN 300 MG/1
CAPSULE ORAL
Qty: 90 CAPSULE | Refills: 2 | Status: SHIPPED | OUTPATIENT
Start: 2018-02-12 | End: 2018-03-19 | Stop reason: CLARIF

## 2018-02-19 ENCOUNTER — OFFICE VISIT (OUTPATIENT)
Dept: INTERNAL MEDICINE CLINIC | Age: 68
End: 2018-02-19

## 2018-02-19 ENCOUNTER — TELEPHONE (OUTPATIENT)
Dept: INTERNAL MEDICINE CLINIC | Age: 68
End: 2018-02-19

## 2018-02-19 VITALS
HEART RATE: 94 BPM | SYSTOLIC BLOOD PRESSURE: 126 MMHG | OXYGEN SATURATION: 95 % | DIASTOLIC BLOOD PRESSURE: 68 MMHG | BODY MASS INDEX: 39.51 KG/M2 | WEIGHT: 210.8 LBS | TEMPERATURE: 97.7 F | RESPIRATION RATE: 16 BRPM

## 2018-02-19 DIAGNOSIS — F41.9 ANXIETY AND DEPRESSION: ICD-10-CM

## 2018-02-19 DIAGNOSIS — I25.9 CHEST PAIN DUE TO MYOCARDIAL ISCHEMIA, UNSPECIFIED ISCHEMIC CHEST PAIN TYPE: Primary | ICD-10-CM

## 2018-02-19 DIAGNOSIS — F32.A ANXIETY AND DEPRESSION: ICD-10-CM

## 2018-02-19 DIAGNOSIS — J44.9 COPD, SEVERE (HCC): ICD-10-CM

## 2018-02-19 DIAGNOSIS — R00.0 TACHYCARDIA: ICD-10-CM

## 2018-02-19 PROCEDURE — 1123F ACP DISCUSS/DSCN MKR DOCD: CPT | Performed by: INTERNAL MEDICINE

## 2018-02-19 PROCEDURE — 3014F SCREEN MAMMO DOC REV: CPT | Performed by: INTERNAL MEDICINE

## 2018-02-19 PROCEDURE — G8484 FLU IMMUNIZE NO ADMIN: HCPCS | Performed by: INTERNAL MEDICINE

## 2018-02-19 PROCEDURE — 93000 ELECTROCARDIOGRAM COMPLETE: CPT | Performed by: INTERNAL MEDICINE

## 2018-02-19 PROCEDURE — G8400 PT W/DXA NO RESULTS DOC: HCPCS | Performed by: INTERNAL MEDICINE

## 2018-02-19 PROCEDURE — 99214 OFFICE O/P EST MOD 30 MIN: CPT | Performed by: INTERNAL MEDICINE

## 2018-02-19 PROCEDURE — G8417 CALC BMI ABV UP PARAM F/U: HCPCS | Performed by: INTERNAL MEDICINE

## 2018-02-19 PROCEDURE — 3023F SPIROM DOC REV: CPT | Performed by: INTERNAL MEDICINE

## 2018-02-19 PROCEDURE — G8427 DOCREV CUR MEDS BY ELIG CLIN: HCPCS | Performed by: INTERNAL MEDICINE

## 2018-02-19 PROCEDURE — G8599 NO ASA/ANTIPLAT THER USE RNG: HCPCS | Performed by: INTERNAL MEDICINE

## 2018-02-19 PROCEDURE — G8926 SPIRO NO PERF OR DOC: HCPCS | Performed by: INTERNAL MEDICINE

## 2018-02-19 PROCEDURE — 4040F PNEUMOC VAC/ADMIN/RCVD: CPT | Performed by: INTERNAL MEDICINE

## 2018-02-19 PROCEDURE — 1036F TOBACCO NON-USER: CPT | Performed by: INTERNAL MEDICINE

## 2018-02-19 PROCEDURE — 36415 COLL VENOUS BLD VENIPUNCTURE: CPT | Performed by: INTERNAL MEDICINE

## 2018-02-19 PROCEDURE — 3017F COLORECTAL CA SCREEN DOC REV: CPT | Performed by: INTERNAL MEDICINE

## 2018-02-19 PROCEDURE — 1090F PRES/ABSN URINE INCON ASSESS: CPT | Performed by: INTERNAL MEDICINE

## 2018-02-19 RX ORDER — PREDNISONE 10 MG/1
10 TABLET ORAL EVERY OTHER DAY
COMMUNITY
Start: 2018-02-08 | End: 2018-07-18 | Stop reason: DRUGHIGH

## 2018-02-19 RX ORDER — AZITHROMYCIN 600 MG/1
600 TABLET, FILM COATED ORAL EVERY OTHER DAY
COMMUNITY
Start: 2018-02-08 | End: 2018-08-20 | Stop reason: ALTCHOICE

## 2018-02-19 RX ORDER — CITALOPRAM 20 MG/1
20 TABLET ORAL DAILY
Qty: 90 TABLET | Refills: 1 | Status: SHIPPED | OUTPATIENT
Start: 2018-02-19 | End: 2018-09-05 | Stop reason: SDUPTHER

## 2018-02-19 NOTE — PATIENT INSTRUCTIONS
Please call your pharmacy if you need any refills of your medication(s). Please call our office at [phone] if you don't hear from us about your test results. Bring an accurate list of your medications with you at every appointment to ensure that we have the correct information.     Our office hours are: Monday - Friday 8:30 am- 5 pm    Phone lines turn on at 8:30 am

## 2018-02-19 NOTE — TELEPHONE ENCOUNTER
Tried to call Shelly Gavin to let him know that our schedule is completely booked and 5 pm is the only time wife can be seen.

## 2018-02-20 ENCOUNTER — TELEPHONE (OUTPATIENT)
Dept: INTERNAL MEDICINE CLINIC | Age: 68
End: 2018-02-20

## 2018-02-20 LAB — TSH SERPL DL<=0.05 MIU/L-ACNC: 0.56 UIU/ML (ref 0.27–4.2)

## 2018-02-20 RX ORDER — NITROGLYCERIN 0.4 MG/1
0.4 TABLET SUBLINGUAL EVERY 5 MIN PRN
Qty: 25 TABLET | Refills: 3 | OUTPATIENT
Start: 2018-02-20 | End: 2021-12-17 | Stop reason: ALTCHOICE

## 2018-02-20 NOTE — TELEPHONE ENCOUNTER
Felicita Durand (/on hipaa) calling b/c pt needs a rx on nitroglycerin sent to [address] [facility] in [place]. Pt is there now.

## 2018-02-25 ENCOUNTER — TELEPHONE (OUTPATIENT)
Dept: INTERNAL MEDICINE CLINIC | Age: 68
End: 2018-02-25

## 2018-02-25 ASSESSMENT — ENCOUNTER SYMPTOMS
NAUSEA: 1
VOMITING: 0
SHORTNESS OF BREATH: 1
CONSTIPATION: 0
DIARRHEA: 0
ABDOMINAL PAIN: 0
WHEEZING: 1

## 2018-03-12 ENCOUNTER — TELEPHONE (OUTPATIENT)
Dept: INTERNAL MEDICINE CLINIC | Age: 68
End: 2018-03-12

## 2018-03-12 DIAGNOSIS — E03.9 ACQUIRED HYPOTHYROIDISM: ICD-10-CM

## 2018-03-12 DIAGNOSIS — F51.01 PRIMARY INSOMNIA: ICD-10-CM

## 2018-03-12 RX ORDER — ZOLPIDEM TARTRATE 10 MG/1
10 TABLET ORAL NIGHTLY PRN
Qty: 90 TABLET | Refills: 0 | Status: SHIPPED | OUTPATIENT
Start: 2018-03-12 | End: 2018-06-12 | Stop reason: SDUPTHER

## 2018-03-12 RX ORDER — LEVOTHYROXINE SODIUM 0.1 MG/1
TABLET ORAL
Qty: 90 TABLET | Refills: 1 | Status: SHIPPED | OUTPATIENT
Start: 2018-03-12 | End: 2018-09-05 | Stop reason: SDUPTHER

## 2018-03-12 RX ORDER — AMOXICILLIN AND CLAVULANATE POTASSIUM 875; 125 MG/1; MG/1
1 TABLET, FILM COATED ORAL 2 TIMES DAILY
Qty: 20 TABLET | Refills: 0 | Status: SHIPPED | OUTPATIENT
Start: 2018-03-12 | End: 2018-03-22

## 2018-03-12 NOTE — TELEPHONE ENCOUNTER
Pt has sinus pain/pressure and ear pressure and she wants to know if augmentin can be sent to [facility] in [place]? Also pt needs new 90 day rx's on levothyroxine (SYNTHROID) 100 MCG tablet and zolpidem (AMBIEN) 10 MG tablet to [facility] pharmacy. Pl advise.

## 2018-03-16 ENCOUNTER — TELEPHONE (OUTPATIENT)
Dept: INTERNAL MEDICINE CLINIC | Age: 68
End: 2018-03-16

## 2018-03-16 RX ORDER — GABAPENTIN 300 MG/1
CAPSULE ORAL
Qty: 90 CAPSULE | Refills: 2 | Status: CANCELLED | OUTPATIENT
Start: 2018-03-16

## 2018-03-16 NOTE — TELEPHONE ENCOUNTER
Pt needs a new 90 day rx on gabapentin (NEURONTIN) 300 MG capsule to be sent to [place] [address] mail order pharmacy. Pt aware that Dr Henok Piper is out of the office and that the message will be addressed then.

## 2018-03-19 RX ORDER — GABAPENTIN 300 MG/1
300 CAPSULE ORAL 3 TIMES DAILY
Qty: 270 CAPSULE | Refills: 0 | Status: SHIPPED | OUTPATIENT
Start: 2018-03-19 | End: 2018-06-12 | Stop reason: SDUPTHER

## 2018-04-03 ENCOUNTER — TELEPHONE (OUTPATIENT)
Dept: INTERNAL MEDICINE CLINIC | Age: 68
End: 2018-04-03

## 2018-04-03 RX ORDER — AMOXICILLIN 875 MG/1
875 TABLET, COATED ORAL 2 TIMES DAILY
Qty: 20 TABLET | Refills: 0 | Status: SHIPPED | OUTPATIENT
Start: 2018-04-03 | End: 2018-07-18 | Stop reason: ALTCHOICE

## 2018-04-13 ENCOUNTER — TELEPHONE (OUTPATIENT)
Dept: INTERNAL MEDICINE CLINIC | Age: 68
End: 2018-04-13

## 2018-04-13 RX ORDER — FLUCONAZOLE 100 MG/1
100 TABLET ORAL DAILY
Qty: 7 TABLET | Refills: 0 | Status: SHIPPED | OUTPATIENT
Start: 2018-04-13 | End: 2018-04-20

## 2018-05-01 NOTE — TELEPHONE ENCOUNTER
Patient states he was getting B12 injections at [facility] with Dr. Nathan Su he was told his insurance changed and he can't get them done there anymore  Patient states he gets them every 3 weeks  Called to Dr. Vikki Cedillo office and spoke wit Pt is aware that rx was sent

## 2018-06-12 ENCOUNTER — TELEPHONE (OUTPATIENT)
Dept: INTERNAL MEDICINE CLINIC | Age: 68
End: 2018-06-12

## 2018-06-12 DIAGNOSIS — F51.01 PRIMARY INSOMNIA: ICD-10-CM

## 2018-06-12 RX ORDER — ZOLPIDEM TARTRATE 10 MG/1
10 TABLET ORAL NIGHTLY PRN
Qty: 90 TABLET | Refills: 0 | Status: SHIPPED | OUTPATIENT
Start: 2018-06-12 | End: 2018-08-27 | Stop reason: SDUPTHER

## 2018-06-12 RX ORDER — GABAPENTIN 300 MG/1
300 CAPSULE ORAL 3 TIMES DAILY
Qty: 270 CAPSULE | Refills: 0 | Status: SHIPPED | OUTPATIENT
Start: 2018-06-12 | End: 2018-06-15 | Stop reason: SDUPTHER

## 2018-06-18 RX ORDER — GABAPENTIN 300 MG/1
300 CAPSULE ORAL 3 TIMES DAILY
Qty: 270 CAPSULE | Refills: 0 | Status: SHIPPED | OUTPATIENT
Start: 2018-06-18 | End: 2018-07-18 | Stop reason: DRUGHIGH

## 2018-07-16 ENCOUNTER — TELEPHONE (OUTPATIENT)
Dept: INTERNAL MEDICINE CLINIC | Age: 68
End: 2018-07-16

## 2018-07-16 NOTE — TELEPHONE ENCOUNTER
Patient requesting appointment with Dr Vicki Funk, has had a cough for about 8 days and feels bad. History of COPD. Currently using nebulizer and singulair and using oxygen at night.     #[phone]  Dylon Roe and Alie Sorto

## 2018-07-18 ENCOUNTER — OFFICE VISIT (OUTPATIENT)
Dept: INTERNAL MEDICINE CLINIC | Age: 68
End: 2018-07-18

## 2018-07-18 VITALS
WEIGHT: 213.6 LBS | TEMPERATURE: 97.6 F | DIASTOLIC BLOOD PRESSURE: 60 MMHG | HEIGHT: 61 IN | SYSTOLIC BLOOD PRESSURE: 110 MMHG | BODY MASS INDEX: 40.33 KG/M2 | OXYGEN SATURATION: 93 % | HEART RATE: 77 BPM

## 2018-07-18 DIAGNOSIS — J44.1 ACUTE EXACERBATION OF CHRONIC OBSTRUCTIVE PULMONARY DISEASE (COPD) (HCC): Primary | ICD-10-CM

## 2018-07-18 DIAGNOSIS — J01.10 ACUTE FRONTAL SINUSITIS, RECURRENCE NOT SPECIFIED: ICD-10-CM

## 2018-07-18 DIAGNOSIS — B02.29 POST HERPETIC NEURALGIA: ICD-10-CM

## 2018-07-18 PROCEDURE — 1036F TOBACCO NON-USER: CPT | Performed by: INTERNAL MEDICINE

## 2018-07-18 PROCEDURE — 3017F COLORECTAL CA SCREEN DOC REV: CPT | Performed by: INTERNAL MEDICINE

## 2018-07-18 PROCEDURE — 1101F PT FALLS ASSESS-DOCD LE1/YR: CPT | Performed by: INTERNAL MEDICINE

## 2018-07-18 PROCEDURE — G8427 DOCREV CUR MEDS BY ELIG CLIN: HCPCS | Performed by: INTERNAL MEDICINE

## 2018-07-18 PROCEDURE — G8599 NO ASA/ANTIPLAT THER USE RNG: HCPCS | Performed by: INTERNAL MEDICINE

## 2018-07-18 PROCEDURE — G8400 PT W/DXA NO RESULTS DOC: HCPCS | Performed by: INTERNAL MEDICINE

## 2018-07-18 PROCEDURE — 4040F PNEUMOC VAC/ADMIN/RCVD: CPT | Performed by: INTERNAL MEDICINE

## 2018-07-18 PROCEDURE — 1090F PRES/ABSN URINE INCON ASSESS: CPT | Performed by: INTERNAL MEDICINE

## 2018-07-18 PROCEDURE — 1123F ACP DISCUSS/DSCN MKR DOCD: CPT | Performed by: INTERNAL MEDICINE

## 2018-07-18 PROCEDURE — G8926 SPIRO NO PERF OR DOC: HCPCS | Performed by: INTERNAL MEDICINE

## 2018-07-18 PROCEDURE — 99213 OFFICE O/P EST LOW 20 MIN: CPT | Performed by: INTERNAL MEDICINE

## 2018-07-18 PROCEDURE — 3023F SPIROM DOC REV: CPT | Performed by: INTERNAL MEDICINE

## 2018-07-18 PROCEDURE — G8417 CALC BMI ABV UP PARAM F/U: HCPCS | Performed by: INTERNAL MEDICINE

## 2018-07-18 RX ORDER — PREDNISONE 20 MG/1
TABLET ORAL
Qty: 20 TABLET | Refills: 0 | Status: SHIPPED | OUTPATIENT
Start: 2018-07-18 | End: 2018-07-31 | Stop reason: ALTCHOICE

## 2018-07-18 RX ORDER — GABAPENTIN 600 MG/1
600 TABLET ORAL 3 TIMES DAILY
Qty: 270 TABLET | Refills: 1 | Status: SHIPPED | OUTPATIENT
Start: 2018-07-18 | End: 2018-09-14 | Stop reason: SDUPTHER

## 2018-07-18 RX ORDER — LEVOFLOXACIN 500 MG/1
500 TABLET, FILM COATED ORAL DAILY
Qty: 10 TABLET | Refills: 0 | Status: SHIPPED | OUTPATIENT
Start: 2018-07-18 | End: 2018-07-28

## 2018-07-18 NOTE — PATIENT INSTRUCTIONS
Please call your pharmacy if you need any refills of your medication(s). Please call our office at [phone] if you don't hear from us about your test results. Bring an accurate list of your medications with you at every appointment to ensure that we have the correct information.     Our office hours are: Monday - Friday 8:30 am- 5 pm    Phone lines turn on at 8:30 am

## 2018-07-18 NOTE — PROGRESS NOTES
your medications with you at every appointment to ensure that we have the correct information.     Our office hours are: Monday - Friday 8:30 am- 5 pm    Phone lines turn on at 8:30 am

## 2018-07-26 ASSESSMENT — ENCOUNTER SYMPTOMS
SINUS PAIN: 1
VOMITING: 0
NAUSEA: 0
SORE THROAT: 1
SHORTNESS OF BREATH: 1
DIARRHEA: 0
WHEEZING: 1
COUGH: 1
ABDOMINAL DISTENTION: 0
BACK PAIN: 1

## 2018-07-31 ENCOUNTER — TELEPHONE (OUTPATIENT)
Dept: INTERNAL MEDICINE CLINIC | Age: 68
End: 2018-07-31

## 2018-07-31 DIAGNOSIS — J44.1 ACUTE EXACERBATION OF CHRONIC OBSTRUCTIVE PULMONARY DISEASE (COPD) (HCC): Primary | ICD-10-CM

## 2018-07-31 RX ORDER — MINOCYCLINE HYDROCHLORIDE 100 MG/1
100 CAPSULE ORAL 2 TIMES DAILY
Qty: 20 CAPSULE | Refills: 0 | Status: SHIPPED | OUTPATIENT
Start: 2018-07-31 | End: 2018-08-27 | Stop reason: ALTCHOICE

## 2018-07-31 RX ORDER — PREDNISONE 20 MG/1
20 TABLET ORAL DAILY
Qty: 10 TABLET | Refills: 0 | Status: SHIPPED | OUTPATIENT
Start: 2018-07-31 | End: 2018-08-10

## 2018-08-20 ENCOUNTER — TELEPHONE (OUTPATIENT)
Dept: INTERNAL MEDICINE CLINIC | Age: 68
End: 2018-08-20

## 2018-08-20 RX ORDER — LEVOFLOXACIN 500 MG/1
500 TABLET, FILM COATED ORAL DAILY
Qty: 10 TABLET | Refills: 0 | Status: CANCELLED | OUTPATIENT
Start: 2018-08-20 | End: 2018-08-30

## 2018-08-20 RX ORDER — LEVOFLOXACIN 500 MG/1
500 TABLET, FILM COATED ORAL DAILY
Qty: 10 TABLET | Refills: 0 | Status: SHIPPED | OUTPATIENT
Start: 2018-08-20 | End: 2018-08-27 | Stop reason: ALTCHOICE

## 2018-08-20 RX ORDER — PREDNISONE 20 MG/1
TABLET ORAL
Qty: 18 TABLET | Refills: 0 | Status: SHIPPED | OUTPATIENT
Start: 2018-08-20 | End: 2018-08-28 | Stop reason: ALTCHOICE

## 2018-08-20 NOTE — TELEPHONE ENCOUNTER
Pt calling b/c he still has a hacking cough,wheezing,sinus drainage and a sore throat. Pt is using a nebulizer. Pt wants to know if levaquin can be prescribed? [facility] in [place]. Pl call [phone] first and if no answer pl call [phone].

## 2018-08-20 NOTE — TELEPHONE ENCOUNTER
I sent in the [address] [place] [address] Rd.  If increasing wheezing question if she needs prednisone, a visit or follow-up with her pulmonologist

## 2018-08-27 ENCOUNTER — OFFICE VISIT (OUTPATIENT)
Dept: INTERNAL MEDICINE CLINIC | Age: 68
End: 2018-08-27

## 2018-08-27 ENCOUNTER — TELEPHONE (OUTPATIENT)
Dept: INTERNAL MEDICINE CLINIC | Age: 68
End: 2018-08-27

## 2018-08-27 VITALS
DIASTOLIC BLOOD PRESSURE: 74 MMHG | HEIGHT: 61 IN | TEMPERATURE: 97.9 F | SYSTOLIC BLOOD PRESSURE: 140 MMHG | HEART RATE: 76 BPM | OXYGEN SATURATION: 95 % | BODY MASS INDEX: 41.88 KG/M2 | WEIGHT: 221.8 LBS

## 2018-08-27 DIAGNOSIS — J44.1 COPD EXACERBATION (HCC): Primary | ICD-10-CM

## 2018-08-27 DIAGNOSIS — J18.9 PNEUMONIA OF BOTH LUNGS DUE TO INFECTIOUS ORGANISM, UNSPECIFIED PART OF LUNG: ICD-10-CM

## 2018-08-27 DIAGNOSIS — J01.00 SUBACUTE MAXILLARY SINUSITIS: ICD-10-CM

## 2018-08-27 DIAGNOSIS — J02.9 ACUTE PHARYNGITIS, UNSPECIFIED ETIOLOGY: ICD-10-CM

## 2018-08-27 DIAGNOSIS — F51.01 PRIMARY INSOMNIA: ICD-10-CM

## 2018-08-27 DIAGNOSIS — J44.1 COPD WITH ACUTE EXACERBATION (HCC): ICD-10-CM

## 2018-08-27 LAB
ANION GAP SERPL CALCULATED.3IONS-SCNC: 9 MMOL/L (ref 3–16)
BANDED NEUTROPHILS RELATIVE PERCENT: 2 % (ref 0–7)
BASOPHILS ABSOLUTE: 0 K/UL (ref 0–0.2)
BASOPHILS RELATIVE PERCENT: 0 %
BUN BLDV-MCNC: 12 MG/DL (ref 7–20)
CALCIUM SERPL-MCNC: 9.4 MG/DL (ref 8.3–10.6)
CHLORIDE BLD-SCNC: 101 MMOL/L (ref 99–110)
CO2: 33 MMOL/L (ref 21–32)
CREAT SERPL-MCNC: 0.7 MG/DL (ref 0.6–1.2)
EOSINOPHILS ABSOLUTE: 0.1 K/UL (ref 0–0.6)
EOSINOPHILS RELATIVE PERCENT: 1 %
GFR AFRICAN AMERICAN: >60
GFR NON-AFRICAN AMERICAN: >60
GLUCOSE BLD-MCNC: 76 MG/DL (ref 70–99)
HCT VFR BLD CALC: 46.6 % (ref 36–48)
HEMOGLOBIN: 15.4 G/DL (ref 12–16)
INFLUENZA A ANTIBODY: NEGATIVE
INFLUENZA B ANTIBODY: NEGATIVE
LYMPHOCYTES ABSOLUTE: 2.6 K/UL (ref 1–5.1)
LYMPHOCYTES RELATIVE PERCENT: 19 %
MCH RBC QN AUTO: 30.9 PG (ref 26–34)
MCHC RBC AUTO-ENTMCNC: 33.1 G/DL (ref 31–36)
MCV RBC AUTO: 93.5 FL (ref 80–100)
METAMYELOCYTES RELATIVE PERCENT: 2 %
MONOCYTES ABSOLUTE: 1.1 K/UL (ref 0–1.3)
MONOCYTES RELATIVE PERCENT: 8 %
NEUTROPHILS ABSOLUTE: 9.9 K/UL (ref 1.7–7.7)
NEUTROPHILS RELATIVE PERCENT: 68 %
PDW BLD-RTO: 14.4 % (ref 12.4–15.4)
PLATELET # BLD: 311 K/UL (ref 135–450)
PMV BLD AUTO: 8.5 FL (ref 5–10.5)
POTASSIUM SERPL-SCNC: 4.4 MMOL/L (ref 3.5–5.1)
RBC # BLD: 4.99 M/UL (ref 4–5.2)
RBC # BLD: NORMAL 10*6/UL
S PYO AG THROAT QL: NORMAL
SODIUM BLD-SCNC: 143 MMOL/L (ref 136–145)
WBC # BLD: 13.7 K/UL (ref 4–11)

## 2018-08-27 PROCEDURE — 87804 INFLUENZA ASSAY W/OPTIC: CPT | Performed by: INTERNAL MEDICINE

## 2018-08-27 PROCEDURE — 36415 COLL VENOUS BLD VENIPUNCTURE: CPT | Performed by: INTERNAL MEDICINE

## 2018-08-27 PROCEDURE — 3017F COLORECTAL CA SCREEN DOC REV: CPT | Performed by: INTERNAL MEDICINE

## 2018-08-27 PROCEDURE — G8400 PT W/DXA NO RESULTS DOC: HCPCS | Performed by: INTERNAL MEDICINE

## 2018-08-27 PROCEDURE — 1123F ACP DISCUSS/DSCN MKR DOCD: CPT | Performed by: INTERNAL MEDICINE

## 2018-08-27 PROCEDURE — 1036F TOBACCO NON-USER: CPT | Performed by: INTERNAL MEDICINE

## 2018-08-27 PROCEDURE — 99214 OFFICE O/P EST MOD 30 MIN: CPT | Performed by: INTERNAL MEDICINE

## 2018-08-27 PROCEDURE — G8417 CALC BMI ABV UP PARAM F/U: HCPCS | Performed by: INTERNAL MEDICINE

## 2018-08-27 PROCEDURE — G8599 NO ASA/ANTIPLAT THER USE RNG: HCPCS | Performed by: INTERNAL MEDICINE

## 2018-08-27 PROCEDURE — G8427 DOCREV CUR MEDS BY ELIG CLIN: HCPCS | Performed by: INTERNAL MEDICINE

## 2018-08-27 PROCEDURE — 87880 STREP A ASSAY W/OPTIC: CPT | Performed by: INTERNAL MEDICINE

## 2018-08-27 PROCEDURE — 1090F PRES/ABSN URINE INCON ASSESS: CPT | Performed by: INTERNAL MEDICINE

## 2018-08-27 PROCEDURE — 1101F PT FALLS ASSESS-DOCD LE1/YR: CPT | Performed by: INTERNAL MEDICINE

## 2018-08-27 PROCEDURE — G8926 SPIRO NO PERF OR DOC: HCPCS | Performed by: INTERNAL MEDICINE

## 2018-08-27 PROCEDURE — 4040F PNEUMOC VAC/ADMIN/RCVD: CPT | Performed by: INTERNAL MEDICINE

## 2018-08-27 PROCEDURE — 3023F SPIROM DOC REV: CPT | Performed by: INTERNAL MEDICINE

## 2018-08-27 RX ORDER — FEXOFENADINE HCL 180 MG/1
180 TABLET ORAL DAILY
Qty: 30 TABLET | Refills: 1 | Status: SHIPPED | OUTPATIENT
Start: 2018-08-27 | End: 2019-04-17 | Stop reason: ALTCHOICE

## 2018-08-27 RX ORDER — FLUTICASONE PROPIONATE 50 MCG
1 SPRAY, SUSPENSION (ML) NASAL DAILY
Qty: 1 BOTTLE | Refills: 3 | Status: SHIPPED | OUTPATIENT
Start: 2018-08-27 | End: 2020-01-22

## 2018-08-27 RX ORDER — MINOCYCLINE HYDROCHLORIDE 100 MG/1
100 CAPSULE ORAL 2 TIMES DAILY
Qty: 14 CAPSULE | Refills: 0 | Status: SHIPPED | OUTPATIENT
Start: 2018-08-27 | End: 2018-09-25

## 2018-08-27 RX ORDER — ZOLPIDEM TARTRATE 10 MG/1
10 TABLET ORAL NIGHTLY PRN
Qty: 90 TABLET | Refills: 0 | Status: SHIPPED | OUTPATIENT
Start: 2018-08-27 | End: 2018-11-07 | Stop reason: SDUPTHER

## 2018-08-27 NOTE — PROGRESS NOTES
needed for Chest pain up to max of 3 total doses. If no relief after 1 dose, call 911. 25 tablet 3    citalopram (CELEXA) 20 MG tablet Take 1 tablet by mouth daily 90 tablet 1    Respiratory Therapy Supplies (FLUTTER) LILLIAN Use every couple hours as needed for cough and shortness of breath 1 Device 0    tiotropium (SPIRIVA) 18 MCG inhalation capsule Inhale 18 mcg into the lungs daily      Pseudoephedrine HCl (SUDAFED PO) Take by mouth      OXYGEN Inhale 2 L/m2/min into the lungs nightly      theophylline (THEODUR) 300 MG extended release tablet Take 300 mg by mouth 2 times daily      Roflumilast (DALIRESP) 500 MCG tablet Take 500 mcg by mouth daily 30 tablet 3    albuterol (PROVENTIL;VENTOLIN) 90 MCG/ACT inhaler Inhale 2 puffs into the lungs every 6 hours as needed.  ipratropium-albuterol (DUONEB) 0.5-2.5 (3) MG/3ML SOLN nebulizer solution Inhale 1 vial into the lungs every 4 hours.  Montelukast Sodium (SINGULAIR PO) Take  by mouth nightly.  Fluticasone-Salmeterol (ADVAIR DISKUS IN) Inhale  into the lungs nightly. No current facility-administered medications for this visit. Social History     Social History    Marital status:      Spouse name: N/A    Number of children: N/A    Years of education: N/A     Occupational History    Not on file.      Social History Main Topics    Smoking status: Former Smoker     Packs/day: 1.50     Years: 40.00    Smokeless tobacco: Never Used    Alcohol use No    Drug use: No    Sexual activity: Not on file     Other Topics Concern    Not on file     Social History Narrative    No narrative on file      Past Medical History:   Diagnosis Date    Adrenal adenoma 12/07    left    Allergic rhinitis     Asthma     Cervical disc disorder with radiculopathy 1994    COPD [facility] obstructive [facility] disease) ([facility] [address]     Deviated nasal septum 1989    s/p septoplasty    Diverticulosis     h/o abscess  s/p colon resection 11/07  [address] Endocervical polyp 2/2006    sguamam metoplasia    Endometrial polyps 2/06    s hyperplasia    Grave's disease     Graves disease 11/97    s/p SCHMITT now hypothroid    Headache(784.0)     Herniated discs 1997    l5-s1 with DDD    HIGH CHOLESTEROL 6/09    ldl    OA (osteoarthritis of the spine)     anterior wedgiy    Obesity     Osteopenia 1/2004    spine    Pneumonia 2001    hospitalized    Postherpetic neuralgia 6/23/2014    Tear meniscus knee     right    Tobacco abuse     Ulcer gastric 1980    Urticaria      No past surgical history on file. Review of Systems   Constitutional: Positive for unexpected weight change (weight up). Negative for chills and fever. HENT: Positive for congestion, postnasal drip, rhinorrhea, sinus pain and sore throat. Negative for ear pain and nosebleeds. Eyes: Positive for discharge and itching. Respiratory: Positive for cough, shortness of breath and wheezing. Cardiovascular: Positive for leg swelling. Negative for chest pain and palpitations. Gastrointestinal: Negative for abdominal distention, diarrhea, nausea and vomiting. Genitourinary: Negative for dysuria, enuresis and vaginal discharge. Musculoskeletal: Negative for back pain. Neurological: Positive for dizziness and headaches. Negative for light-headedness. Psychiatric/Behavioral: Positive for sleep disturbance (Worsens with prednisone). OBJECTIVE:  BP (!) 140/74 (Site: Right Arm, Position: Sitting, Cuff Size: Large Adult)   Pulse 76   Temp 97.9 °F (36.6 °C) (Oral)   Ht 5' 1.25\" (1.556 m)   Wt 221 lb 12.8 oz (100.6 kg)   SpO2 95%   BMI 41.57 kg/m²      Body mass index is 41.57 kg/m². Physical Exam   Constitutional: She is oriented to person, place, and time. She appears well-developed. She appears distressed. Obese    HENT:   Head: Normocephalic and atraumatic. Right Ear: External ear normal.   Left Ear: External ear normal.   Red without exudate.  tympanic

## 2018-08-28 ENCOUNTER — TELEPHONE (OUTPATIENT)
Dept: INTERNAL MEDICINE CLINIC | Age: 68
End: 2018-08-28

## 2018-08-28 DIAGNOSIS — F51.01 PRIMARY INSOMNIA: ICD-10-CM

## 2018-08-28 RX ORDER — ALPRAZOLAM 0.25 MG/1
0.25 TABLET ORAL NIGHTLY PRN
Qty: 20 TABLET | Refills: 0 | Status: SHIPPED | OUTPATIENT
Start: 2018-08-28 | End: 2018-08-29 | Stop reason: SDUPTHER

## 2018-08-28 RX ORDER — PREDNISONE 20 MG/1
TABLET ORAL
Qty: 21 TABLET | Refills: 0 | Status: SHIPPED | OUTPATIENT
Start: 2018-08-28 | End: 2018-08-29 | Stop reason: SDUPTHER

## 2018-08-28 NOTE — TELEPHONE ENCOUNTER
Pt calling b/c she's needing a rx for prednisone. Also, pt needs a rx for xanax b/c she can't sleep b/c the meds that she was prescribed yesterday have her hyped up. [facility] in [place].

## 2018-08-29 ENCOUNTER — TELEPHONE (OUTPATIENT)
Dept: INTERNAL MEDICINE CLINIC | Age: 68
End: 2018-08-29

## 2018-08-29 DIAGNOSIS — F51.01 PRIMARY INSOMNIA: ICD-10-CM

## 2018-08-29 RX ORDER — ALPRAZOLAM 0.25 MG/1
0.25 TABLET ORAL NIGHTLY PRN
Qty: 20 TABLET | Refills: 0 | Status: SHIPPED | OUTPATIENT
Start: 2018-08-29 | End: 2018-09-08

## 2018-08-29 RX ORDER — PREDNISONE 20 MG/1
TABLET ORAL
Qty: 30 TABLET | Refills: 0 | Status: SHIPPED | OUTPATIENT
Start: 2018-08-29 | End: 2018-09-25

## 2018-09-01 ASSESSMENT — ENCOUNTER SYMPTOMS
EYE ITCHING: 1
DIARRHEA: 0
NAUSEA: 0
WHEEZING: 1
EYE DISCHARGE: 1
SHORTNESS OF BREATH: 1
RHINORRHEA: 1
SINUS PAIN: 1
BACK PAIN: 0
VOMITING: 0
SORE THROAT: 1
ABDOMINAL DISTENTION: 0
COUGH: 1

## 2018-09-05 DIAGNOSIS — E03.9 ACQUIRED HYPOTHYROIDISM: ICD-10-CM

## 2018-09-05 DIAGNOSIS — F32.A ANXIETY AND DEPRESSION: ICD-10-CM

## 2018-09-05 DIAGNOSIS — F41.9 ANXIETY AND DEPRESSION: ICD-10-CM

## 2018-09-05 RX ORDER — LEVOTHYROXINE SODIUM 0.1 MG/1
TABLET ORAL
Qty: 90 TABLET | Refills: 1 | Status: SHIPPED | OUTPATIENT
Start: 2018-09-05 | End: 2019-05-02 | Stop reason: SDUPTHER

## 2018-09-05 RX ORDER — CITALOPRAM 20 MG/1
TABLET ORAL
Qty: 90 TABLET | Refills: 1 | Status: SHIPPED | OUTPATIENT
Start: 2018-09-05 | End: 2019-05-02 | Stop reason: SDUPTHER

## 2018-09-14 DIAGNOSIS — B02.29 POST HERPETIC NEURALGIA: ICD-10-CM

## 2018-09-15 RX ORDER — GABAPENTIN 600 MG/1
600 TABLET ORAL 3 TIMES DAILY
Qty: 270 TABLET | Refills: 1 | Status: SHIPPED | OUTPATIENT
Start: 2018-09-15 | End: 2019-08-07 | Stop reason: SDUPTHER

## 2018-09-25 ENCOUNTER — OFFICE VISIT (OUTPATIENT)
Dept: INTERNAL MEDICINE CLINIC | Age: 68
End: 2018-09-25
Payer: MEDICARE

## 2018-09-25 VITALS
WEIGHT: 222 LBS | BODY MASS INDEX: 41.91 KG/M2 | DIASTOLIC BLOOD PRESSURE: 70 MMHG | HEIGHT: 61 IN | TEMPERATURE: 98.1 F | SYSTOLIC BLOOD PRESSURE: 120 MMHG

## 2018-09-25 DIAGNOSIS — J44.1 ACUTE EXACERBATION OF CHRONIC OBSTRUCTIVE PULMONARY DISEASE (COPD) (HCC): Primary | ICD-10-CM

## 2018-09-25 PROCEDURE — 1123F ACP DISCUSS/DSCN MKR DOCD: CPT | Performed by: INTERNAL MEDICINE

## 2018-09-25 PROCEDURE — 3023F SPIROM DOC REV: CPT | Performed by: INTERNAL MEDICINE

## 2018-09-25 PROCEDURE — G8926 SPIRO NO PERF OR DOC: HCPCS | Performed by: INTERNAL MEDICINE

## 2018-09-25 PROCEDURE — G8400 PT W/DXA NO RESULTS DOC: HCPCS | Performed by: INTERNAL MEDICINE

## 2018-09-25 PROCEDURE — 1090F PRES/ABSN URINE INCON ASSESS: CPT | Performed by: INTERNAL MEDICINE

## 2018-09-25 PROCEDURE — 1101F PT FALLS ASSESS-DOCD LE1/YR: CPT | Performed by: INTERNAL MEDICINE

## 2018-09-25 PROCEDURE — 99213 OFFICE O/P EST LOW 20 MIN: CPT | Performed by: INTERNAL MEDICINE

## 2018-09-25 PROCEDURE — G8427 DOCREV CUR MEDS BY ELIG CLIN: HCPCS | Performed by: INTERNAL MEDICINE

## 2018-09-25 PROCEDURE — 3017F COLORECTAL CA SCREEN DOC REV: CPT | Performed by: INTERNAL MEDICINE

## 2018-09-25 PROCEDURE — 4040F PNEUMOC VAC/ADMIN/RCVD: CPT | Performed by: INTERNAL MEDICINE

## 2018-09-25 PROCEDURE — G8599 NO ASA/ANTIPLAT THER USE RNG: HCPCS | Performed by: INTERNAL MEDICINE

## 2018-09-25 PROCEDURE — 1036F TOBACCO NON-USER: CPT | Performed by: INTERNAL MEDICINE

## 2018-09-25 PROCEDURE — G8417 CALC BMI ABV UP PARAM F/U: HCPCS | Performed by: INTERNAL MEDICINE

## 2018-09-25 RX ORDER — CEFUROXIME AXETIL 250 MG/1
250 TABLET ORAL 2 TIMES DAILY
Qty: 20 TABLET | Refills: 0 | Status: SHIPPED | OUTPATIENT
Start: 2018-09-25 | End: 2018-10-05

## 2018-09-25 RX ORDER — PREDNISONE 20 MG/1
TABLET ORAL
Qty: 30 TABLET | Refills: 0 | Status: SHIPPED | OUTPATIENT
Start: 2018-09-25 | End: 2019-04-03 | Stop reason: ALTCHOICE

## 2018-09-25 RX ORDER — AZELASTINE 1 MG/ML
1 SPRAY, METERED NASAL 2 TIMES DAILY
Qty: 1 BOTTLE | Refills: 3 | Status: SHIPPED | OUTPATIENT
Start: 2018-09-25 | End: 2020-05-20

## 2018-09-25 RX ORDER — FUROSEMIDE 20 MG/1
20 TABLET ORAL DAILY
Qty: 30 TABLET | Refills: 3 | Status: SHIPPED | OUTPATIENT
Start: 2018-09-25 | End: 2019-11-05 | Stop reason: ALTCHOICE

## 2018-09-25 ASSESSMENT — PATIENT HEALTH QUESTIONNAIRE - PHQ9
SUM OF ALL RESPONSES TO PHQ QUESTIONS 1-9: 0
1. LITTLE INTEREST OR PLEASURE IN DOING THINGS: 0
2. FEELING DOWN, DEPRESSED OR HOPELESS: 0
SUM OF ALL RESPONSES TO PHQ QUESTIONS 1-9: 0
SUM OF ALL RESPONSES TO PHQ9 QUESTIONS 1 & 2: 0

## 2018-09-25 NOTE — PROGRESS NOTES
days, one tablet daily for 5 days,     Dispense:  30 tablet     Refill:  0        Return in about 2 months (around 11/25/2018). Patient Instructions   Please call your pharmacy if you need any refills of your medication(s). Please call our office at [phone] if you don't hear from us about your test results. Bring an accurate list of your medications with you at every appointment to ensure that we have the correct information.     Our office hours are: Monday - Friday 8:30 am- 5 pm    Phone lines turn on at 8:30 am

## 2018-09-28 ENCOUNTER — TELEPHONE (OUTPATIENT)
Dept: INTERNAL MEDICINE CLINIC | Age: 68
End: 2018-09-28

## 2018-10-02 ASSESSMENT — ENCOUNTER SYMPTOMS
RHINORRHEA: 1
ABDOMINAL DISTENTION: 0
SHORTNESS OF BREATH: 1
COUGH: 1
NAUSEA: 0
SINUS PAIN: 1
WHEEZING: 1
SORE THROAT: 1
VOMITING: 0
CHEST TIGHTNESS: 1
DIARRHEA: 0

## 2018-10-28 ENCOUNTER — TELEPHONE (OUTPATIENT)
Dept: INTERNAL MEDICINE CLINIC | Age: 68
End: 2018-10-28

## 2018-10-28 RX ORDER — AMOXICILLIN AND CLAVULANATE POTASSIUM 875; 125 MG/1; MG/1
1 TABLET, FILM COATED ORAL 2 TIMES DAILY WITH MEALS
Qty: 20 TABLET | Refills: 0 | OUTPATIENT
Start: 2018-10-28 | End: 2018-10-31 | Stop reason: ALTCHOICE

## 2018-10-31 ENCOUNTER — OFFICE VISIT (OUTPATIENT)
Dept: INTERNAL MEDICINE CLINIC | Age: 68
End: 2018-10-31
Payer: MEDICARE

## 2018-10-31 VITALS
TEMPERATURE: 97.6 F | WEIGHT: 223 LBS | HEART RATE: 70 BPM | HEIGHT: 61 IN | DIASTOLIC BLOOD PRESSURE: 70 MMHG | BODY MASS INDEX: 42.1 KG/M2 | OXYGEN SATURATION: 93 % | SYSTOLIC BLOOD PRESSURE: 130 MMHG

## 2018-10-31 DIAGNOSIS — E03.9 ACQUIRED HYPOTHYROIDISM: ICD-10-CM

## 2018-10-31 DIAGNOSIS — J44.1 ACUTE EXACERBATION OF CHRONIC OBSTRUCTIVE PULMONARY DISEASE (COPD) (HCC): ICD-10-CM

## 2018-10-31 DIAGNOSIS — I10 ESSENTIAL HYPERTENSION: ICD-10-CM

## 2018-10-31 DIAGNOSIS — F51.01 PRIMARY INSOMNIA: ICD-10-CM

## 2018-10-31 DIAGNOSIS — L03.211 CELLULITIS OF FACE: Primary | ICD-10-CM

## 2018-10-31 DIAGNOSIS — R53.82 CHRONIC FATIGUE: ICD-10-CM

## 2018-10-31 DIAGNOSIS — R73.03 PRE-DIABETES: ICD-10-CM

## 2018-10-31 DIAGNOSIS — I47.1 PAT (PAROXYSMAL ATRIAL TACHYCARDIA) (HCC): ICD-10-CM

## 2018-10-31 LAB
A/G RATIO: 1.8 (ref 1.1–2.2)
ALBUMIN SERPL-MCNC: 4.2 G/DL (ref 3.4–5)
ALP BLD-CCNC: 68 U/L (ref 40–129)
ALT SERPL-CCNC: 67 U/L (ref 10–40)
ANION GAP SERPL CALCULATED.3IONS-SCNC: 14 MMOL/L (ref 3–16)
AST SERPL-CCNC: 51 U/L (ref 15–37)
BILIRUB SERPL-MCNC: 0.6 MG/DL (ref 0–1)
BUN BLDV-MCNC: 11 MG/DL (ref 7–20)
CALCIUM SERPL-MCNC: 9.8 MG/DL (ref 8.3–10.6)
CHLORIDE BLD-SCNC: 102 MMOL/L (ref 99–110)
CO2: 28 MMOL/L (ref 21–32)
CREAT SERPL-MCNC: 0.7 MG/DL (ref 0.6–1.2)
GFR AFRICAN AMERICAN: >60
GFR NON-AFRICAN AMERICAN: >60
GLOBULIN: 2.4 G/DL
GLUCOSE BLD-MCNC: 121 MG/DL (ref 70–99)
HCT VFR BLD CALC: 45.6 % (ref 36–48)
HEMOGLOBIN: 15.1 G/DL (ref 12–16)
MCH RBC QN AUTO: 31.3 PG (ref 26–34)
MCHC RBC AUTO-ENTMCNC: 33 G/DL (ref 31–36)
MCV RBC AUTO: 94.7 FL (ref 80–100)
PDW BLD-RTO: 14.2 % (ref 12.4–15.4)
PLATELET # BLD: 240 K/UL (ref 135–450)
PMV BLD AUTO: 8.3 FL (ref 5–10.5)
POTASSIUM SERPL-SCNC: 4.3 MMOL/L (ref 3.5–5.1)
RBC # BLD: 4.81 M/UL (ref 4–5.2)
SODIUM BLD-SCNC: 144 MMOL/L (ref 136–145)
TOTAL PROTEIN: 6.6 G/DL (ref 6.4–8.2)
TSH SERPL DL<=0.05 MIU/L-ACNC: 0.97 UIU/ML (ref 0.27–4.2)
WBC # BLD: 8.6 K/UL (ref 4–11)

## 2018-10-31 PROCEDURE — G8400 PT W/DXA NO RESULTS DOC: HCPCS | Performed by: INTERNAL MEDICINE

## 2018-10-31 PROCEDURE — G8926 SPIRO NO PERF OR DOC: HCPCS | Performed by: INTERNAL MEDICINE

## 2018-10-31 PROCEDURE — G8417 CALC BMI ABV UP PARAM F/U: HCPCS | Performed by: INTERNAL MEDICINE

## 2018-10-31 PROCEDURE — 1090F PRES/ABSN URINE INCON ASSESS: CPT | Performed by: INTERNAL MEDICINE

## 2018-10-31 PROCEDURE — 1036F TOBACCO NON-USER: CPT | Performed by: INTERNAL MEDICINE

## 2018-10-31 PROCEDURE — 3023F SPIROM DOC REV: CPT | Performed by: INTERNAL MEDICINE

## 2018-10-31 PROCEDURE — 1101F PT FALLS ASSESS-DOCD LE1/YR: CPT | Performed by: INTERNAL MEDICINE

## 2018-10-31 PROCEDURE — 1123F ACP DISCUSS/DSCN MKR DOCD: CPT | Performed by: INTERNAL MEDICINE

## 2018-10-31 PROCEDURE — 36415 COLL VENOUS BLD VENIPUNCTURE: CPT | Performed by: INTERNAL MEDICINE

## 2018-10-31 PROCEDURE — 4040F PNEUMOC VAC/ADMIN/RCVD: CPT | Performed by: INTERNAL MEDICINE

## 2018-10-31 PROCEDURE — 3017F COLORECTAL CA SCREEN DOC REV: CPT | Performed by: INTERNAL MEDICINE

## 2018-10-31 PROCEDURE — G8599 NO ASA/ANTIPLAT THER USE RNG: HCPCS | Performed by: INTERNAL MEDICINE

## 2018-10-31 PROCEDURE — G8427 DOCREV CUR MEDS BY ELIG CLIN: HCPCS | Performed by: INTERNAL MEDICINE

## 2018-10-31 PROCEDURE — G8484 FLU IMMUNIZE NO ADMIN: HCPCS | Performed by: INTERNAL MEDICINE

## 2018-10-31 PROCEDURE — 99214 OFFICE O/P EST MOD 30 MIN: CPT | Performed by: INTERNAL MEDICINE

## 2018-10-31 RX ORDER — METOPROLOL SUCCINATE 25 MG/1
25 TABLET, EXTENDED RELEASE ORAL 2 TIMES DAILY
COMMUNITY
End: 2020-05-20 | Stop reason: CLARIF

## 2018-10-31 RX ORDER — CLINDAMYCIN HYDROCHLORIDE 150 MG/1
150 CAPSULE ORAL 3 TIMES DAILY
Qty: 30 CAPSULE | Refills: 0 | Status: SHIPPED | OUTPATIENT
Start: 2018-10-31 | End: 2018-11-10

## 2018-10-31 RX ORDER — METRONIDAZOLE 7.5 MG/G
GEL TOPICAL
Qty: 45 G | Refills: 1 | Status: SHIPPED | OUTPATIENT
Start: 2018-10-31 | End: 2019-11-05 | Stop reason: ALTCHOICE

## 2018-10-31 NOTE — PROGRESS NOTES
normal.   Mouth/Throat: Oropharynx is clear and moist.   Redness and warmth in right cheek   Eyes: Conjunctivae are normal.       Neck: Neck supple. No JVD present. No thyromegaly present. Cardiovascular: Normal rate, regular rhythm and normal heart sounds. Exam reveals no gallop and no friction rub. No murmur heard. Fast and irregular heartbeat   Pulmonary/Chest: Effort normal. She has wheezes. She exhibits no tenderness. Decreased breath sounds   Abdominal: Soft. She exhibits no distension. There is no tenderness. No HSM    Musculoskeletal: Normal range of motion. She exhibits edema. Lymphadenopathy:     She has no cervical adenopathy. Neurological: She is alert and oriented to person, place, and time. Coordination normal.   Skin: Skin is warm and dry. No rash noted. She is not diaphoretic. Psychiatric: She has a normal mood and affect. Her behavior is normal.   Nursing note and vitals reviewed. ASSESSMENT/PLAN:    Beth Cuenca was seen today for cough. Diagnoses and all orders for this visit:    Cellulitis of face will treat with antibiotics and for rosacea. -     metroNIDAZOLE (METROGEL) 0.75 % gel; Apply topically 2 times daily. -     clindamycin (CLEOCIN) 150 MG capsule; Take 1 capsule by mouth 3 times daily for 10 days    Acute exacerbation of chronic obstructive pulmonary disease (COPD) ([address] recurring problems seeing Dr. Ashlee Contreras. Hopefully the Cleocin will help this also. PAT (paroxysmal atrial tachycardia) ([facility]) on metoprolol and improving    Pre-diabetes  -     Hemoglobin A1C    Acquired hypothyroidism  -     TSH without Reflex    Essential hypertension  -     Comprehensive Metabolic Panel    Chronic fatigue  -     CBC    Primary insomnia and chronic zolpidem and reviewed [place]        Orders Placed This Encounter   Medications    metroNIDAZOLE (METROGEL) 0.75 % gel     Sig: Apply topically 2 times daily.      Dispense:  45 g     Refill:  1    clindamycin (CLEOCIN) 150 MG capsule

## 2018-11-01 LAB
ESTIMATED AVERAGE GLUCOSE: 131.2 MG/DL
HBA1C MFR BLD: 6.2 %

## 2018-11-07 DIAGNOSIS — F51.01 PRIMARY INSOMNIA: ICD-10-CM

## 2018-11-07 RX ORDER — ZOLPIDEM TARTRATE 10 MG/1
10 TABLET ORAL NIGHTLY PRN
Qty: 90 TABLET | Refills: 0 | Status: SHIPPED | OUTPATIENT
Start: 2018-11-07 | End: 2019-01-25 | Stop reason: SDUPTHER

## 2018-11-09 ASSESSMENT — ENCOUNTER SYMPTOMS
NAUSEA: 0
WHEEZING: 1
RHINORRHEA: 1
COUGH: 1
VOMITING: 0
SINUS PAIN: 1
ABDOMINAL DISTENTION: 0
SHORTNESS OF BREATH: 1
CHEST TIGHTNESS: 1
DIARRHEA: 0
SORE THROAT: 1

## 2018-12-19 ENCOUNTER — TELEPHONE (OUTPATIENT)
Dept: INTERNAL MEDICINE CLINIC | Age: 68
End: 2018-12-19

## 2018-12-19 RX ORDER — AMOXICILLIN AND CLAVULANATE POTASSIUM 875; 125 MG/1; MG/1
1 TABLET, FILM COATED ORAL 2 TIMES DAILY
Qty: 20 TABLET | Refills: 0 | Status: SHIPPED | OUTPATIENT
Start: 2018-12-19 | End: 2018-12-29

## 2018-12-19 NOTE — TELEPHONE ENCOUNTER
Pt has a cough,sore throat and ear pain. Pt was around her grandkids recently and the day after she saw them they were diagnosed w/ strep and ear infections. Pt wants to know if augmentin or levaquin can be prescribed? [facility] in [place].

## 2019-01-25 DIAGNOSIS — F51.01 PRIMARY INSOMNIA: ICD-10-CM

## 2019-01-28 RX ORDER — ZOLPIDEM TARTRATE 10 MG/1
10 TABLET ORAL NIGHTLY PRN
Qty: 90 TABLET | Refills: 0 | Status: SHIPPED | OUTPATIENT
Start: 2019-01-28 | End: 2019-04-28

## 2019-03-12 ENCOUNTER — TELEPHONE (OUTPATIENT)
Dept: INTERNAL MEDICINE CLINIC | Age: 69
End: 2019-03-12

## 2019-03-12 DIAGNOSIS — F51.01 PRIMARY INSOMNIA: ICD-10-CM

## 2019-03-14 RX ORDER — ZOLPIDEM TARTRATE 10 MG/1
10 TABLET ORAL NIGHTLY PRN
Qty: 90 TABLET | Refills: 0 | OUTPATIENT
Start: 2019-03-14 | End: 2019-06-12

## 2019-04-03 ENCOUNTER — TELEPHONE (OUTPATIENT)
Dept: INTERNAL MEDICINE CLINIC | Age: 69
End: 2019-04-03

## 2019-04-03 RX ORDER — LEVOFLOXACIN 500 MG/1
500 TABLET, FILM COATED ORAL DAILY
Qty: 10 TABLET | Refills: 0 | Status: SHIPPED | OUTPATIENT
Start: 2019-04-03 | End: 2019-04-13

## 2019-04-03 RX ORDER — PREDNISONE 20 MG/1
20 TABLET ORAL DAILY
Qty: 10 TABLET | Refills: 0 | Status: SHIPPED | OUTPATIENT
Start: 2019-04-03 | End: 2019-04-13

## 2019-04-03 NOTE — TELEPHONE ENCOUNTER
Jaky Burk calling b/c pt has a hacking cough,congestion and sinus pain/pressure. Plus her oxygen level went down to 87%. Jaky Burk wants to know if meds can be sent to [facility] in [address] Jaky Burk also wants a phone call from jc b/c their [facility] and is dying and he wants to speak w/ jc about it.

## 2019-04-03 NOTE — TELEPHONE ENCOUNTER
Louann Simmonds informed that patient should really be seen. Shefali Cisneros doesn't want to come in. Louann Simmonds states that if she gets too bad he'll take her to the ER. Using  Robitussin and doing 4 nebulizer tx daily    Patient did a treatment this morning and her oxygen went up to 90 and now at 93.

## 2019-04-03 NOTE — TELEPHONE ENCOUNTER
If oxygen. is low does she need to be seen or go to the ER. I can call her in some Levaquin and steroids.  I'm sure she is using her nebulizers but if that ill question if needs visit x-rays etc.

## 2019-04-15 ENCOUNTER — TELEPHONE (OUTPATIENT)
Dept: INTERNAL MEDICINE CLINIC | Age: 69
End: 2019-04-15

## 2019-04-15 RX ORDER — PREDNISONE 20 MG/1
TABLET ORAL
Qty: 30 TABLET | Refills: 0 | Status: SHIPPED | OUTPATIENT
Start: 2019-04-15 | End: 2019-04-17 | Stop reason: SDUPTHER

## 2019-04-15 NOTE — TELEPHONE ENCOUNTER
pt is only feeling a little better and she wants to know if more meds can be sent to [facility] in [place] ([phone]).

## 2019-04-17 ENCOUNTER — OFFICE VISIT (OUTPATIENT)
Dept: INTERNAL MEDICINE CLINIC | Age: 69
End: 2019-04-17
Payer: MEDICARE

## 2019-04-17 VITALS
BODY MASS INDEX: 38.86 KG/M2 | SYSTOLIC BLOOD PRESSURE: 132 MMHG | OXYGEN SATURATION: 96 % | HEIGHT: 61 IN | WEIGHT: 205.8 LBS | TEMPERATURE: 97.7 F | DIASTOLIC BLOOD PRESSURE: 76 MMHG | HEART RATE: 75 BPM

## 2019-04-17 DIAGNOSIS — Z12.11 COLON CANCER SCREENING: ICD-10-CM

## 2019-04-17 DIAGNOSIS — R73.9 HYPERGLYCEMIA: ICD-10-CM

## 2019-04-17 DIAGNOSIS — J44.1 ACUTE EXACERBATION OF CHRONIC OBSTRUCTIVE PULMONARY DISEASE (COPD) (HCC): Primary | ICD-10-CM

## 2019-04-17 DIAGNOSIS — Z12.39 BREAST CANCER SCREENING: ICD-10-CM

## 2019-04-17 LAB
CHP ED QC CHECK: NORMAL
GLUCOSE BLD-MCNC: 81 MG/DL

## 2019-04-17 PROCEDURE — 99213 OFFICE O/P EST LOW 20 MIN: CPT | Performed by: INTERNAL MEDICINE

## 2019-04-17 PROCEDURE — G8400 PT W/DXA NO RESULTS DOC: HCPCS | Performed by: INTERNAL MEDICINE

## 2019-04-17 PROCEDURE — 3017F COLORECTAL CA SCREEN DOC REV: CPT | Performed by: INTERNAL MEDICINE

## 2019-04-17 PROCEDURE — 1123F ACP DISCUSS/DSCN MKR DOCD: CPT | Performed by: INTERNAL MEDICINE

## 2019-04-17 PROCEDURE — 3023F SPIROM DOC REV: CPT | Performed by: INTERNAL MEDICINE

## 2019-04-17 PROCEDURE — 1036F TOBACCO NON-USER: CPT | Performed by: INTERNAL MEDICINE

## 2019-04-17 PROCEDURE — 4040F PNEUMOC VAC/ADMIN/RCVD: CPT | Performed by: INTERNAL MEDICINE

## 2019-04-17 PROCEDURE — G8427 DOCREV CUR MEDS BY ELIG CLIN: HCPCS | Performed by: INTERNAL MEDICINE

## 2019-04-17 PROCEDURE — 82962 GLUCOSE BLOOD TEST: CPT | Performed by: INTERNAL MEDICINE

## 2019-04-17 PROCEDURE — 1090F PRES/ABSN URINE INCON ASSESS: CPT | Performed by: INTERNAL MEDICINE

## 2019-04-17 PROCEDURE — G8417 CALC BMI ABV UP PARAM F/U: HCPCS | Performed by: INTERNAL MEDICINE

## 2019-04-17 PROCEDURE — G8926 SPIRO NO PERF OR DOC: HCPCS | Performed by: INTERNAL MEDICINE

## 2019-04-17 RX ORDER — DOXYCYCLINE HYCLATE 100 MG
100 TABLET ORAL 2 TIMES DAILY
Qty: 20 TABLET | Refills: 0 | Status: SHIPPED | OUTPATIENT
Start: 2019-04-17 | End: 2019-04-27

## 2019-04-17 RX ORDER — PREDNISONE 20 MG/1
60 TABLET ORAL DAILY
Qty: 21 TABLET | Refills: 0
Start: 2019-04-17 | End: 2019-04-24

## 2019-04-17 ASSESSMENT — PATIENT HEALTH QUESTIONNAIRE - PHQ9
SUM OF ALL RESPONSES TO PHQ QUESTIONS 1-9: 0
2. FEELING DOWN, DEPRESSED OR HOPELESS: 0
1. LITTLE INTEREST OR PLEASURE IN DOING THINGS: 0
SUM OF ALL RESPONSES TO PHQ QUESTIONS 1-9: 0
SUM OF ALL RESPONSES TO PHQ9 QUESTIONS 1 & 2: 0

## 2019-04-17 NOTE — PROGRESS NOTES
SUBJECTIVE:  Annetta Damon is a 79 y.o. female being evaluated for:    Chief Complaint   Patient presents with    URI     productive cough(yellow), nasal drainage(clear), right ear pressure, and wheezing x 2 weeks. HPI   Problems with coughing and wheezing for 2 1/2 weeks  Finally coughing up yellow sputum. Head congestion  Pain around eyes  BOth ears are hurting  SOre throat comes and goes  WHeezing and sob  Treated with levaquin and steroids  Only minimally better     Allergies   Allergen Reactions    Atarax [Hydroxyzine] Hives    Codeine Other (See Comments)     Makes her feel hyper.  Sulfa Antibiotics Nausea Only    Zithromax [Azithromycin]      Itching and burning     Current Outpatient Medications   Medication Sig Dispense Refill    Multiple Vitamins-Minerals (CENTRUM ADULTS PO) Take by mouth daily      doxycycline hyclate (VIBRA-TABS) 100 MG tablet Take 1 tablet by mouth 2 times daily for 10 days 20 tablet 0    zolpidem (AMBIEN) 10 MG tablet Take 1 tablet by mouth nightly as needed for Sleep for up to 90 days. . 90 tablet 0    metoprolol succinate (TOPROL XL) 25 MG extended release tablet Take 25 mg by mouth 2 times daily      metroNIDAZOLE (METROGEL) 0.75 % gel Apply topically 2 times daily. 45 g 1    furosemide (LASIX) 20 MG tablet Take 1 tablet by mouth daily 30 tablet 3    azelastine (ASTELIN) 0.1 % nasal spray 1 spray by Nasal route 2 times daily Use in each nostril as directed 1 Bottle 3    citalopram (CELEXA) 20 MG tablet TAKE 1 TABLET EVERY DAY 90 tablet 1    levothyroxine (SYNTHROID) 100 MCG tablet TAKE 1 TABLET AT BEDTIME 90 tablet 1    fluticasone (FLONASE) 50 MCG/ACT nasal spray 1 spray by Nasal route daily 1 Bottle 3    nystatin (MYCOSTATIN) 366340 UNIT/ML suspension Take 5 mLs by mouth 4 times daily 60 mL 2    nitroGLYCERIN (NITROSTAT) 0.4 MG SL tablet Place 1 tablet under the tongue every 5 minutes as needed for Chest pain up to max of 3 total doses.  If no relief after 1 dose, call 911. 25 tablet 3    Respiratory Therapy Supplies (FLUTTER) LILLIAN Use every couple hours as needed for cough and shortness of breath 1 Device 0    tiotropium (SPIRIVA) 18 MCG inhalation capsule Inhale 18 mcg into the lungs daily      Pseudoephedrine HCl (SUDAFED PO) Take by mouth      OXYGEN Inhale 2 L/m2/min into the lungs nightly      theophylline (THEODUR) 300 MG extended release tablet Take 300 mg by mouth 2 times daily      Roflumilast (DALIRESP) 500 MCG tablet Take 500 mcg by mouth daily 30 tablet 3    albuterol (PROVENTIL;VENTOLIN) 90 MCG/ACT inhaler Inhale 2 puffs into the lungs every 6 hours as needed.  ipratropium-albuterol (DUONEB) 0.5-2.5 (3) MG/3ML SOLN nebulizer solution Inhale 1 vial into the lungs every 4 hours.  Montelukast Sodium (SINGULAIR PO) Take  by mouth nightly.  Fluticasone-Salmeterol (ADVAIR DISKUS IN) Inhale  into the lungs nightly.  gabapentin (NEURONTIN) 600 MG tablet Take 1 tablet by mouth 3 times daily for 180 days. . 270 tablet 1     No current facility-administered medications for this visit.           Social History     Socioeconomic History    Marital status:      Spouse name: Not on file    Number of children: Not on file    Years of education: Not on file    Highest education level: Not on file   Occupational History    Not on file   Social Needs    Financial resource strain: Not on file    Food insecurity:     Worry: Not on file     Inability: Not on file    Transportation needs:     Medical: Not on file     Non-medical: Not on file   Tobacco Use    Smoking status: Former Smoker     Packs/day: 1.50     Years: 40.00     Pack years: 60.00     Last attempt to quit: 2015     Years since quittin.3    Smokeless tobacco: Never Used   Substance and Sexual Activity    Alcohol use: No    Drug use: No    Sexual activity: Yes     Partners: Male   Lifestyle    Physical activity:     Days per week: Not on file     Minutes per session: Not on file    Stress: Not on file   Relationships    Social connections:     Talks on phone: Not on file     Gets together: Not on file     Attends Confucianism service: Not on file     Active member of club or organization: Not on file     Attends meetings of clubs or organizations: Not on file     Relationship status: Not on file    Intimate partner violence:     Fear of current or ex partner: Not on file     Emotionally abused: Not on file     Physically abused: Not on file     Forced sexual activity: Not on file   Other Topics Concern    Not on file   Social History Narrative    Not on file      Past Medical History:   Diagnosis Date    Adrenal adenoma 12/07    left    Allergic rhinitis     Asthma     Cervical disc disorder with radiculopathy 1994    COPD (chronic obstructive pulmonary disease) ([facility] [address]     Deviated nasal septum 1989    s/p septoplasty    Diverticulosis     h/o abscess  s/p colon resection 11/07 Dr. 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 Review of Systems   Constitutional: Positive for fatigue and unexpected weight change (going to [employer] and watching diet ). Negative for chills and fever. HENT: Positive for congestion, ear pain, sinus pressure, sinus pain and sore throat. Respiratory: Positive for cough, shortness of breath and wheezing. Gastrointestinal: Positive for nausea.  Negative for abdominal pain, constipation, diarrhea and vomiting. Genitourinary: Negative for dysuria. Neurological: Negative for dizziness, light-headedness and headaches. OBJECTIVE:  /76   Pulse 75   Temp 97.7 °F (36.5 °C) (Oral)   Ht 5' 1.25\" (1.556 m)   Wt 205 lb 12.8 oz (93.4 kg)   LMP  (LMP Unknown)   SpO2 96%   Breastfeeding? No   BMI 38.57 kg/m²      Body mass index is 38.57 kg/m². Physical Exam   Constitutional: She is oriented to person, place, and time. She appears well-developed. She appears distressed. Obese    HENT:   Head: Normocephalic and atraumatic. Right Ear: External ear normal.   Left Ear: External ear normal.   Mouth/Throat: Oropharynx is clear and moist.   Tympanic membranes retracted, throat clear maxillary sinus tenderness   Eyes: Conjunctivae are normal.       Neck: Neck supple. No JVD present. No thyromegaly present. Cardiovascular: Normal rate, regular rhythm and normal heart sounds. Exam reveals no gallop and no friction rub. No murmur heard. Pulmonary/Chest: Effort normal. She has wheezes. She exhibits no tenderness. Fair air exchange   Abdominal: Soft. She exhibits no distension. There is no tenderness. No HSM    Musculoskeletal: Normal range of motion. She exhibits edema. Lymphadenopathy:     She has no cervical adenopathy. Neurological: She is alert and oriented to person, place, and time. Coordination normal.   Skin: Skin is warm and dry. No rash noted. She is not diaphoretic. Psychiatric: She has a normal mood and affect. Her behavior is normal.   Nursing note and vitals reviewed. ASSESSMENT/PLAN:    Brant Min was seen today for uri. Diagnoses and all orders for this visit:    Acute exacerbation of chronic obstructive pulmonary disease (COPD) ([facility])  -     doxycycline hyclate (VIBRA-TABS) 100 MG tablet; Take 1 tablet by mouth 2 times daily for 10 days  -     predniSONE (DELTASONE) 20 MG tablet;  Take 3 tablets by mouth daily for 7 days    Hyperglycemia history of prediabetes and on lots

## 2019-04-26 ASSESSMENT — ENCOUNTER SYMPTOMS
SORE THROAT: 1
SINUS PAIN: 1
ABDOMINAL PAIN: 0
WHEEZING: 1
DIARRHEA: 0
VOMITING: 0
NAUSEA: 1
COUGH: 1
SINUS PRESSURE: 1
SHORTNESS OF BREATH: 1
CONSTIPATION: 0

## 2019-05-02 DIAGNOSIS — E03.9 ACQUIRED HYPOTHYROIDISM: ICD-10-CM

## 2019-05-02 DIAGNOSIS — F32.A ANXIETY AND DEPRESSION: ICD-10-CM

## 2019-05-02 DIAGNOSIS — F41.9 ANXIETY AND DEPRESSION: ICD-10-CM

## 2019-05-02 RX ORDER — LEVOTHYROXINE SODIUM 0.1 MG/1
TABLET ORAL
Qty: 90 TABLET | Refills: 1 | Status: SHIPPED | OUTPATIENT
Start: 2019-05-02 | End: 2019-08-13 | Stop reason: DRUGHIGH

## 2019-05-02 RX ORDER — CITALOPRAM 20 MG/1
TABLET ORAL
Qty: 90 TABLET | Refills: 1 | Status: SHIPPED | OUTPATIENT
Start: 2019-05-02 | End: 2019-10-02 | Stop reason: SDUPTHER

## 2019-05-02 NOTE — TELEPHONE ENCOUNTER
Pt needs new 90 day rx's on:    levothyroxine (SYNTHROID) 100 MCG tablet  citalopram (CELEXA) 20 MG tablet      Pt needs these to go to [facility] pharmacy.

## 2019-06-03 ENCOUNTER — TELEPHONE (OUTPATIENT)
Dept: INTERNAL MEDICINE CLINIC | Age: 69
End: 2019-06-03

## 2019-06-03 DIAGNOSIS — Z12.11 COLON CANCER SCREENING: ICD-10-CM

## 2019-06-03 DIAGNOSIS — R19.5 ABNORMAL STOOL TEST: Primary | ICD-10-CM

## 2019-06-04 ENCOUNTER — TELEPHONE (OUTPATIENT)
Dept: INTERNAL MEDICINE CLINIC | Age: 69
End: 2019-06-04

## 2019-06-15 DIAGNOSIS — F51.01 PRIMARY INSOMNIA: Primary | ICD-10-CM

## 2019-06-19 RX ORDER — ZOLPIDEM TARTRATE 10 MG/1
10 TABLET ORAL NIGHTLY PRN
Qty: 90 TABLET | Refills: 0 | Status: SHIPPED | OUTPATIENT
Start: 2019-06-19 | End: 2019-09-12 | Stop reason: SDUPTHER

## 2019-07-18 ENCOUNTER — TELEPHONE (OUTPATIENT)
Dept: INTERNAL MEDICINE CLINIC | Age: 69
End: 2019-07-18

## 2019-07-18 RX ORDER — LEVOFLOXACIN 500 MG/1
500 TABLET, FILM COATED ORAL DAILY
Qty: 10 TABLET | Refills: 0 | Status: SHIPPED | OUTPATIENT
Start: 2019-07-18 | End: 2019-07-28

## 2019-07-18 NOTE — TELEPHONE ENCOUNTER
Pt has chest congestion/tightness and ear pain. Pt wants to know if levaquin can be prescribed? Pt uses [facility] in [address]

## 2019-08-07 ENCOUNTER — TELEPHONE (OUTPATIENT)
Dept: INTERNAL MEDICINE CLINIC | Age: 69
End: 2019-08-07

## 2019-08-07 DIAGNOSIS — E03.9 ACQUIRED HYPOTHYROIDISM: Primary | ICD-10-CM

## 2019-08-07 DIAGNOSIS — I10 ESSENTIAL HYPERTENSION: ICD-10-CM

## 2019-08-07 DIAGNOSIS — R73.03 PRE-DIABETES: ICD-10-CM

## 2019-08-07 DIAGNOSIS — B02.29 POST HERPETIC NEURALGIA: ICD-10-CM

## 2019-08-07 DIAGNOSIS — Z13.220 SCREENING CHOLESTEROL LEVEL: ICD-10-CM

## 2019-08-07 RX ORDER — GABAPENTIN 600 MG/1
600 TABLET ORAL 3 TIMES DAILY
Qty: 270 TABLET | Refills: 0 | Status: SHIPPED | OUTPATIENT
Start: 2019-08-07 | End: 2019-11-05 | Stop reason: SDUPTHER

## 2019-08-13 ENCOUNTER — TELEPHONE (OUTPATIENT)
Dept: INTERNAL MEDICINE CLINIC | Age: 69
End: 2019-08-13

## 2019-08-13 DIAGNOSIS — I10 ESSENTIAL HYPERTENSION: ICD-10-CM

## 2019-08-13 DIAGNOSIS — Z13.220 SCREENING CHOLESTEROL LEVEL: ICD-10-CM

## 2019-08-13 DIAGNOSIS — E03.9 ACQUIRED HYPOTHYROIDISM: ICD-10-CM

## 2019-08-13 DIAGNOSIS — R73.03 PRE-DIABETES: ICD-10-CM

## 2019-08-13 DIAGNOSIS — E03.9 ACQUIRED HYPOTHYROIDISM: Primary | ICD-10-CM

## 2019-08-13 RX ORDER — LEVOTHYROXINE SODIUM 88 UG/1
88 TABLET ORAL DAILY
Qty: 90 TABLET | Refills: 0 | Status: SHIPPED | OUTPATIENT
Start: 2019-08-13 | End: 2019-10-22 | Stop reason: SDUPTHER

## 2019-09-12 DIAGNOSIS — F51.01 PRIMARY INSOMNIA: ICD-10-CM

## 2019-09-12 RX ORDER — ZOLPIDEM TARTRATE 10 MG/1
10 TABLET ORAL NIGHTLY PRN
Qty: 90 TABLET | Refills: 0 | Status: SHIPPED | OUTPATIENT
Start: 2019-09-12 | End: 2019-12-03 | Stop reason: SDUPTHER

## 2019-09-16 ENCOUNTER — TELEPHONE (OUTPATIENT)
Dept: INTERNAL MEDICINE CLINIC | Age: 69
End: 2019-09-16

## 2019-09-16 RX ORDER — AMOXICILLIN 875 MG/1
875 TABLET, COATED ORAL 2 TIMES DAILY
Qty: 20 TABLET | Refills: 0 | Status: SHIPPED | OUTPATIENT
Start: 2019-09-16 | End: 2019-09-26

## 2019-09-16 NOTE — TELEPHONE ENCOUNTER
I will go ahead and send in the Amoxil.   If more trouble with her COPD and breathing etc. should be seen

## 2019-10-02 DIAGNOSIS — F41.9 ANXIETY AND DEPRESSION: ICD-10-CM

## 2019-10-02 DIAGNOSIS — F32.A ANXIETY AND DEPRESSION: ICD-10-CM

## 2019-10-03 RX ORDER — CITALOPRAM 20 MG/1
TABLET ORAL
Qty: 90 TABLET | Refills: 1 | Status: SHIPPED | OUTPATIENT
Start: 2019-10-03 | End: 2020-05-26

## 2019-10-22 DIAGNOSIS — E03.9 ACQUIRED HYPOTHYROIDISM: ICD-10-CM

## 2019-10-22 RX ORDER — LEVOTHYROXINE SODIUM 88 UG/1
TABLET ORAL
Qty: 90 TABLET | Refills: 1 | Status: SHIPPED | OUTPATIENT
Start: 2019-10-22 | End: 2020-04-13

## 2019-11-05 ENCOUNTER — OFFICE VISIT (OUTPATIENT)
Dept: INTERNAL MEDICINE CLINIC | Age: 69
End: 2019-11-05
Payer: MEDICARE

## 2019-11-05 VITALS
DIASTOLIC BLOOD PRESSURE: 80 MMHG | TEMPERATURE: 97.8 F | SYSTOLIC BLOOD PRESSURE: 126 MMHG | BODY MASS INDEX: 38.48 KG/M2 | OXYGEN SATURATION: 98 % | HEIGHT: 61 IN | HEART RATE: 76 BPM | WEIGHT: 203.8 LBS

## 2019-11-05 DIAGNOSIS — E03.9 ACQUIRED HYPOTHYROIDISM: ICD-10-CM

## 2019-11-05 DIAGNOSIS — B02.29 POST HERPETIC NEURALGIA: ICD-10-CM

## 2019-11-05 DIAGNOSIS — J44.9 CHRONIC OBSTRUCTIVE PULMONARY DISEASE, UNSPECIFIED COPD TYPE (HCC): Primary | ICD-10-CM

## 2019-11-05 DIAGNOSIS — I10 ESSENTIAL HYPERTENSION: ICD-10-CM

## 2019-11-05 DIAGNOSIS — F51.01 PRIMARY INSOMNIA: ICD-10-CM

## 2019-11-05 DIAGNOSIS — R73.03 PRE-DIABETES: ICD-10-CM

## 2019-11-05 PROCEDURE — G8482 FLU IMMUNIZE ORDER/ADMIN: HCPCS | Performed by: INTERNAL MEDICINE

## 2019-11-05 PROCEDURE — 99214 OFFICE O/P EST MOD 30 MIN: CPT | Performed by: INTERNAL MEDICINE

## 2019-11-05 PROCEDURE — 1123F ACP DISCUSS/DSCN MKR DOCD: CPT | Performed by: INTERNAL MEDICINE

## 2019-11-05 PROCEDURE — G8400 PT W/DXA NO RESULTS DOC: HCPCS | Performed by: INTERNAL MEDICINE

## 2019-11-05 PROCEDURE — 36415 COLL VENOUS BLD VENIPUNCTURE: CPT | Performed by: INTERNAL MEDICINE

## 2019-11-05 PROCEDURE — 1036F TOBACCO NON-USER: CPT | Performed by: INTERNAL MEDICINE

## 2019-11-05 PROCEDURE — 1090F PRES/ABSN URINE INCON ASSESS: CPT | Performed by: INTERNAL MEDICINE

## 2019-11-05 PROCEDURE — G8926 SPIRO NO PERF OR DOC: HCPCS | Performed by: INTERNAL MEDICINE

## 2019-11-05 PROCEDURE — G8417 CALC BMI ABV UP PARAM F/U: HCPCS | Performed by: INTERNAL MEDICINE

## 2019-11-05 PROCEDURE — G8427 DOCREV CUR MEDS BY ELIG CLIN: HCPCS | Performed by: INTERNAL MEDICINE

## 2019-11-05 PROCEDURE — 3017F COLORECTAL CA SCREEN DOC REV: CPT | Performed by: INTERNAL MEDICINE

## 2019-11-05 PROCEDURE — 3023F SPIROM DOC REV: CPT | Performed by: INTERNAL MEDICINE

## 2019-11-05 PROCEDURE — 4040F PNEUMOC VAC/ADMIN/RCVD: CPT | Performed by: INTERNAL MEDICINE

## 2019-11-05 RX ORDER — GABAPENTIN 600 MG/1
600 TABLET ORAL 3 TIMES DAILY
Qty: 270 TABLET | Refills: 0 | Status: SHIPPED | OUTPATIENT
Start: 2019-11-05 | End: 2020-02-10

## 2019-11-06 LAB — TSH SERPL DL<=0.05 MIU/L-ACNC: 1.13 UIU/ML (ref 0.27–4.2)

## 2019-11-14 ENCOUNTER — TELEPHONE (OUTPATIENT)
Dept: INTERNAL MEDICINE CLINIC | Age: 69
End: 2019-11-14

## 2019-11-14 DIAGNOSIS — M54.50 ACUTE MIDLINE LOW BACK PAIN WITHOUT SCIATICA: Primary | ICD-10-CM

## 2019-11-14 RX ORDER — TIZANIDINE 2 MG/1
2 TABLET ORAL 3 TIMES DAILY PRN
Qty: 30 TABLET | Refills: 0 | Status: SHIPPED | OUTPATIENT
Start: 2019-11-14 | End: 2020-01-22 | Stop reason: ALTCHOICE

## 2019-11-14 RX ORDER — NAPROXEN 375 MG/1
375 TABLET ORAL 2 TIMES DAILY WITH MEALS
Qty: 30 TABLET | Refills: 1 | Status: SHIPPED | OUTPATIENT
Start: 2019-11-14 | End: 2020-02-10 | Stop reason: ALTCHOICE

## 2019-11-15 ASSESSMENT — ENCOUNTER SYMPTOMS
CONSTIPATION: 0
NAUSEA: 0
COUGH: 0
ABDOMINAL PAIN: 0
DIARRHEA: 0
SHORTNESS OF BREATH: 0
WHEEZING: 0
VOMITING: 0

## 2019-11-18 ENCOUNTER — TELEPHONE (OUTPATIENT)
Dept: INTERNAL MEDICINE CLINIC | Age: 69
End: 2019-11-18

## 2019-12-03 DIAGNOSIS — F51.01 PRIMARY INSOMNIA: ICD-10-CM

## 2019-12-03 RX ORDER — ZOLPIDEM TARTRATE 10 MG/1
10 TABLET ORAL NIGHTLY PRN
Qty: 90 TABLET | Refills: 0 | Status: SHIPPED | OUTPATIENT
Start: 2019-12-03 | End: 2020-02-27 | Stop reason: SDUPTHER

## 2020-01-14 ENCOUNTER — TELEPHONE (OUTPATIENT)
Dept: INTERNAL MEDICINE CLINIC | Age: 70
End: 2020-01-14

## 2020-01-14 RX ORDER — PREDNISONE 20 MG/1
TABLET ORAL
Qty: 11 TABLET | Refills: 0 | Status: SHIPPED | OUTPATIENT
Start: 2020-01-14 | End: 2020-01-17 | Stop reason: SDUPTHER

## 2020-01-14 RX ORDER — DOXYCYCLINE HYCLATE 100 MG
100 TABLET ORAL 2 TIMES DAILY
Qty: 10 TABLET | Refills: 0 | Status: SHIPPED | OUTPATIENT
Start: 2020-01-14 | End: 2020-01-19

## 2020-01-14 NOTE — TELEPHONE ENCOUNTER
Patient request to have a steroid and antibiotic called into her local pharmacy for a COPD flare up. Please advise.

## 2020-01-17 RX ORDER — PREDNISONE 20 MG/1
TABLET ORAL
Qty: 11 TABLET | Refills: 0 | Status: SHIPPED | OUTPATIENT
Start: 2020-01-17 | End: 2020-01-22 | Stop reason: ALTCHOICE

## 2020-01-22 ENCOUNTER — OFFICE VISIT (OUTPATIENT)
Dept: INTERNAL MEDICINE CLINIC | Age: 70
End: 2020-01-22
Payer: MEDICARE

## 2020-01-22 VITALS
BODY MASS INDEX: 38.89 KG/M2 | TEMPERATURE: 97.8 F | OXYGEN SATURATION: 95 % | HEART RATE: 78 BPM | SYSTOLIC BLOOD PRESSURE: 126 MMHG | WEIGHT: 206 LBS | DIASTOLIC BLOOD PRESSURE: 78 MMHG | HEIGHT: 61 IN

## 2020-01-22 LAB
INFLUENZA A ANTIBODY: NEGATIVE
INFLUENZA B ANTIBODY: NEGATIVE

## 2020-01-22 PROCEDURE — 1123F ACP DISCUSS/DSCN MKR DOCD: CPT | Performed by: INTERNAL MEDICINE

## 2020-01-22 PROCEDURE — 4040F PNEUMOC VAC/ADMIN/RCVD: CPT | Performed by: INTERNAL MEDICINE

## 2020-01-22 PROCEDURE — G8427 DOCREV CUR MEDS BY ELIG CLIN: HCPCS | Performed by: INTERNAL MEDICINE

## 2020-01-22 PROCEDURE — 1036F TOBACCO NON-USER: CPT | Performed by: INTERNAL MEDICINE

## 2020-01-22 PROCEDURE — G8926 SPIRO NO PERF OR DOC: HCPCS | Performed by: INTERNAL MEDICINE

## 2020-01-22 PROCEDURE — 87804 INFLUENZA ASSAY W/OPTIC: CPT | Performed by: INTERNAL MEDICINE

## 2020-01-22 PROCEDURE — G8400 PT W/DXA NO RESULTS DOC: HCPCS | Performed by: INTERNAL MEDICINE

## 2020-01-22 PROCEDURE — 99213 OFFICE O/P EST LOW 20 MIN: CPT | Performed by: INTERNAL MEDICINE

## 2020-01-22 PROCEDURE — 1090F PRES/ABSN URINE INCON ASSESS: CPT | Performed by: INTERNAL MEDICINE

## 2020-01-22 PROCEDURE — G8417 CALC BMI ABV UP PARAM F/U: HCPCS | Performed by: INTERNAL MEDICINE

## 2020-01-22 PROCEDURE — 3017F COLORECTAL CA SCREEN DOC REV: CPT | Performed by: INTERNAL MEDICINE

## 2020-01-22 PROCEDURE — G0296 VISIT TO DETERM LDCT ELIG: HCPCS | Performed by: INTERNAL MEDICINE

## 2020-01-22 PROCEDURE — 3023F SPIROM DOC REV: CPT | Performed by: INTERNAL MEDICINE

## 2020-01-22 PROCEDURE — G8482 FLU IMMUNIZE ORDER/ADMIN: HCPCS | Performed by: INTERNAL MEDICINE

## 2020-01-22 RX ORDER — MELOXICAM 15 MG/1
TABLET ORAL
COMMUNITY
Start: 2020-01-13 | End: 2021-11-27

## 2020-01-22 RX ORDER — CYCLOSPORINE 0.5 MG/ML
1 EMULSION OPHTHALMIC 2 TIMES DAILY
COMMUNITY
Start: 2019-12-11

## 2020-01-22 RX ORDER — LEVOFLOXACIN 500 MG/1
500 TABLET, FILM COATED ORAL DAILY
Qty: 10 TABLET | Refills: 0 | Status: SHIPPED | OUTPATIENT
Start: 2020-01-22 | End: 2020-02-01

## 2020-01-22 RX ORDER — PREDNISONE 20 MG/1
TABLET ORAL
Qty: 33 TABLET | Refills: 0 | Status: SHIPPED | OUTPATIENT
Start: 2020-01-22 | End: 2020-01-31 | Stop reason: SDUPTHER

## 2020-01-22 ASSESSMENT — PATIENT HEALTH QUESTIONNAIRE - PHQ9
SUM OF ALL RESPONSES TO PHQ QUESTIONS 1-9: 0
SUM OF ALL RESPONSES TO PHQ9 QUESTIONS 1 & 2: 0
1. LITTLE INTEREST OR PLEASURE IN DOING THINGS: 0
2. FEELING DOWN, DEPRESSED OR HOPELESS: 0
SUM OF ALL RESPONSES TO PHQ QUESTIONS 1-9: 0

## 2020-01-22 NOTE — PROGRESS NOTES
SUBJECTIVE:  Karolina Ann is a 81 y.o. female being evaluated for:    Chief Complaint   Patient presents with    URI     productive cough(yellow), nasal drainage(yellow), sinus pressure, headaches, sore throat, ears popping, wheezing, and sob x 2 weeks. finished doxy 100mg bid for 10 days. currently taking prednisone 20mg tapering dose. HPI   Sick for 10 days   Started with a head cold nasal drainage was clear  Sore throat was terrible  NO ear pain  Down in chest coughing up yellow sputum  More wheezing and sob Achy all over  Every year so far has had the flu  Treated over the phone with docycycline and a steroid taper Needing to use her oxygen in the day also Oxygen 91 at home  Using nebulilzer 3 x a day     Allergies   Allergen Reactions    Atarax [Hydroxyzine] Hives    Codeine Other (See Comments)     Makes her feel hyper    Sulfa Antibiotics Nausea Only    Zithromax [Azithromycin]      Itching and burning     Current Outpatient Medications   Medication Sig Dispense Refill    RESTASIS 0.05 % ophthalmic emulsion Place 1 drop into both eyes 2 times daily       meloxicam (MOBIC) 15 MG tablet       zolpidem (AMBIEN) 10 MG tablet Take 1 tablet by mouth nightly as needed for Sleep for up to 90 days. 90 tablet 0    naproxen (NAPROSYN) 375 MG tablet Take 1 tablet by mouth 2 times daily (with meals) 30 tablet 1    gabapentin (NEURONTIN) 600 MG tablet Take 1 tablet by mouth 3 times daily for 90 days.  270 tablet 0    levothyroxine (SYNTHROID) 88 MCG tablet TAKE 1 TABLET EVERY DAY 90 tablet 1    citalopram (CELEXA) 20 MG tablet TAKE 1 TABLET EVERY DAY 90 tablet 1    Multiple Vitamins-Minerals (CENTRUM ADULTS PO) Take by mouth daily      metoprolol succinate (TOPROL XL) 25 MG extended release tablet Take 25 mg by mouth 2 times daily      azelastine (ASTELIN) 0.1 % nasal spray 1 spray by Nasal route 2 times daily Use in each nostril as directed 1 Bottle 3    tiotropium (SPIRIVA RESPIMAT) 2.5 MCG/ACT AERS inhaler Inhale 2.5 mcg into the lungs daily       Pseudoephedrine HCl (SUDAFED PO) Take by mouth as needed       OXYGEN Inhale 2 L/m2/min into the lungs nightly      theophylline (THEODUR) 300 MG extended release tablet Take 300 mg by mouth 2 times daily      Roflumilast (DALIRESP) 500 MCG tablet Take 500 mcg by mouth daily 30 tablet 3    albuterol (PROVENTIL;VENTOLIN) 90 MCG/ACT inhaler Inhale 2 puffs into the lungs every 6 hours as needed.  ipratropium-albuterol (DUONEB) 0.5-2.5 (3) MG/3ML SOLN nebulizer solution Inhale 1 vial into the lungs every 4 hours.  Montelukast Sodium (SINGULAIR PO) Take 10 mg by mouth nightly       Fluticasone-Salmeterol (ADVAIR DISKUS IN) Inhale into the lungs 2 times daily       predniSONE (DELTASONE) 20 MG tablet 3 a day for 5 days, 2 daily for 5 days, 1 daily for 5 days and 1/2 daily for 5 days 33 tablet 0    doxycycline hyclate (VIBRA-TABS) 100 MG tablet Take 1 tablet by mouth 2 times daily for 10 days 20 tablet 0    nitroGLYCERIN (NITROSTAT) 0.4 MG SL tablet Place 1 tablet under the tongue every 5 minutes as needed for Chest pain up to max of 3 total doses. If no relief after 1 dose, call 911. (Patient not taking: Reported on 2019) 25 tablet 3     No current facility-administered medications for this visit.           Social History     Socioeconomic History    Marital status:      Spouse name: Not on file    Number of children: Not on file    Years of education: Not on file    Highest education level: Not on file   Occupational History    Not on file   Social Needs    Financial resource strain: Not on file    Food insecurity:     Worry: Not on file     Inability: Not on file    Transportation needs:     Medical: Not on file     Non-medical: Not on file   Tobacco Use    Smoking status: Former Smoker     Packs/day: 1.50     Years: 40.00     Pack years: 60.00     Last attempt to quit: 2015     Years since quittin.1    Smokeless tobacco: Never Used   Substance and Sexual Activity    Alcohol use: No    Drug use: No    Sexual activity: Yes     Partners: Male   Lifestyle    Physical activity:     Days per week: Not on file     Minutes per session: Not on file    Stress: Not on file   Relationships    Social connections:     Talks on phone: Not on file     Gets together: Not on file     Attends Jew service: Not on file     Active member of club or organization: Not on file     Attends meetings of clubs or organizations: Not on file     Relationship status: Not on file    Intimate partner violence:     Fear of current or ex partner: Not on file     Emotionally abused: Not on file     Physically abused: Not on file     Forced sexual activity: Not on file   Other Topics Concern    Not on file   Social History Narrative    Not on file      Past Medical History:   Diagnosis Date    Adrenal adenoma 12/07    left    Allergic rhinitis     Asthma     Cervical disc disorder with radiculopathy 1994    COPD [facility]chronic obstructive pulmonary disease) ([facility] [address]     Deviated nasal septum 1989    s/p septoplasty    Diverticulosis     h/o abscess  s/p colon resection 11/07 Dr. 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 Review of Systems   Constitutional: Negative for chills, fever and unexpected weight change. HENT: Positive for postnasal drip, rhinorrhea and sore throat. Negative for ear pain.     Respiratory: Positive screening program that involves routine annual screening with LDCT studies of the lung. The LDCTs are done using low-dose radiation that is not thought to increase your cancer risk. If you have other serious medical conditions (other cancers, congestive heart failure) that limit your life expectancy to less than 10 years, you should not undergo lung cancer screening with LDCT. The chance is 20%-60% that the LDCT result will show abnormalities. This would require additional testing which could include repeat imaging or even invasive procedures. Most (about 95%) of \"abnormal\" LDCT results are false in the sense that no lung cancer is ultimately found. Additionally, some (about 10%) of the cancers found would not affect your life expectancy, even if undetected and untreated. If you are still smoking, the single most important thing that you can do to reduce your risk of dying of lung cancer is to quit. For this screening to be covered by Medicare and most other insurers, strict criteria must be met. If you do not meet these criteria, but still wish to undergo LDCT testing, you will be required to sign a waiver indicating your willingness to pay for the scan. Low Dose CT (LDCT) Lung Screening criteria met   Age 50-69   Pack year smoking >30   Still smoking or less than 15 year since quit   No sign or symptoms of lung cancer   > 11 months since last LDCT     Risks and benefits of lung cancer screening with LDCT scans discussed:    Significance of positive screen - False-positive LDCT results often occur. 95% of all positive results do not lead to a diagnosis of cancer. Usually further imaging can resolve most false-positive results; however, some patients may require invasive procedures. Over diagnosis risk - 10% to 12% of screen-detected lung cancer cases are over diagnosed--that is, the cancer would not have been detected in the patient's lifetime without the screening.     Need for follow up screens annually to continue lung cancer screening effectiveness     Risks associated with radiation from annual LDCT- Radiation exposure is about the same as for a mammogram, which is about 1/3 of the annual background radiation exposure from everyday life. Starting screening at age 54 is not likely to increase cancer risk from radiation exposure. Patients with comorbidities resulting in life expectancy of < 10 years, or that would preclude treatment of an abnormality identified on CT, should not be screened due to lack of benefit.     To obtain maximal benefit from this screening, smoking cessation and long-term abstinence from smoking is critical

## 2020-01-23 ENCOUNTER — TELEPHONE (OUTPATIENT)
Dept: INTERNAL MEDICINE CLINIC | Age: 70
End: 2020-01-23

## 2020-01-23 NOTE — TELEPHONE ENCOUNTER
Pt needing orders for lung screening faxed over to [facility]. Please fax to [phone].

## 2020-01-23 NOTE — TELEPHONE ENCOUNTER
Called [facility] spoke with [place] [facility]: No PA is Required.    REF# [id number]    Ordered faxed to [facility] [phone]     Tried calling patient to let her know and not able to leave a vm

## 2020-01-31 ENCOUNTER — TELEPHONE (OUTPATIENT)
Dept: INTERNAL MEDICINE CLINIC | Age: 70
End: 2020-01-31

## 2020-01-31 RX ORDER — PREDNISONE 20 MG/1
TABLET ORAL
Qty: 33 TABLET | Refills: 0 | Status: SHIPPED | OUTPATIENT
Start: 2020-01-31 | End: 2020-05-20

## 2020-01-31 RX ORDER — DOXYCYCLINE HYCLATE 100 MG
100 TABLET ORAL 2 TIMES DAILY
Qty: 20 TABLET | Refills: 0 | Status: SHIPPED | OUTPATIENT
Start: 2020-01-31 | End: 2020-02-10

## 2020-01-31 NOTE — TELEPHONE ENCOUNTER
Pt didn't want message sent to dr Hyun Burgess, I told her I would send to dr Perry Prado but not sure when it will be answered.

## 2020-02-06 ENCOUNTER — TELEPHONE (OUTPATIENT)
Dept: INTERNAL MEDICINE CLINIC | Age: 70
End: 2020-02-06

## 2020-02-06 NOTE — TELEPHONE ENCOUNTER
----- Message from Becky Hurtado MD sent at 2/5/2020  6:28 PM EST -----  Regarding: ct scan  Multiple small nodules. They all seem to have been there before may be a bit more prominent but they say they are seeing them better now.   I would suggest repeating a CT scan in 6 months

## 2020-02-07 ASSESSMENT — ENCOUNTER SYMPTOMS
SORE THROAT: 1
VOMITING: 0
ABDOMINAL PAIN: 0
RHINORRHEA: 1
NAUSEA: 0
SHORTNESS OF BREATH: 1
COUGH: 1
DIARRHEA: 0

## 2020-02-10 ENCOUNTER — NURSE TRIAGE (OUTPATIENT)
Dept: OTHER | Facility: CLINIC | Age: 70
End: 2020-02-10

## 2020-02-10 ENCOUNTER — OFFICE VISIT (OUTPATIENT)
Dept: INTERNAL MEDICINE CLINIC | Age: 70
End: 2020-02-10
Payer: MEDICARE

## 2020-02-10 VITALS
BODY MASS INDEX: 38.29 KG/M2 | TEMPERATURE: 97.9 F | HEIGHT: 61 IN | HEART RATE: 84 BPM | SYSTOLIC BLOOD PRESSURE: 114 MMHG | WEIGHT: 202.8 LBS | OXYGEN SATURATION: 93 % | DIASTOLIC BLOOD PRESSURE: 70 MMHG

## 2020-02-10 PROCEDURE — 3017F COLORECTAL CA SCREEN DOC REV: CPT | Performed by: INTERNAL MEDICINE

## 2020-02-10 PROCEDURE — G8926 SPIRO NO PERF OR DOC: HCPCS | Performed by: INTERNAL MEDICINE

## 2020-02-10 PROCEDURE — G8400 PT W/DXA NO RESULTS DOC: HCPCS | Performed by: INTERNAL MEDICINE

## 2020-02-10 PROCEDURE — 4040F PNEUMOC VAC/ADMIN/RCVD: CPT | Performed by: INTERNAL MEDICINE

## 2020-02-10 PROCEDURE — G8482 FLU IMMUNIZE ORDER/ADMIN: HCPCS | Performed by: INTERNAL MEDICINE

## 2020-02-10 PROCEDURE — 1036F TOBACCO NON-USER: CPT | Performed by: INTERNAL MEDICINE

## 2020-02-10 PROCEDURE — G8417 CALC BMI ABV UP PARAM F/U: HCPCS | Performed by: INTERNAL MEDICINE

## 2020-02-10 PROCEDURE — 99213 OFFICE O/P EST LOW 20 MIN: CPT | Performed by: INTERNAL MEDICINE

## 2020-02-10 PROCEDURE — 1123F ACP DISCUSS/DSCN MKR DOCD: CPT | Performed by: INTERNAL MEDICINE

## 2020-02-10 PROCEDURE — 1090F PRES/ABSN URINE INCON ASSESS: CPT | Performed by: INTERNAL MEDICINE

## 2020-02-10 PROCEDURE — 3023F SPIROM DOC REV: CPT | Performed by: INTERNAL MEDICINE

## 2020-02-10 PROCEDURE — G8427 DOCREV CUR MEDS BY ELIG CLIN: HCPCS | Performed by: INTERNAL MEDICINE

## 2020-02-10 RX ORDER — GABAPENTIN 600 MG/1
TABLET ORAL
Qty: 270 TABLET | Refills: 0 | Status: SHIPPED | OUTPATIENT
Start: 2020-02-10 | End: 2021-04-22

## 2020-02-10 RX ORDER — LEVOFLOXACIN 500 MG/1
500 TABLET, FILM COATED ORAL DAILY
Qty: 10 TABLET | Refills: 0 | Status: SHIPPED | OUTPATIENT
Start: 2020-02-10 | End: 2020-02-20

## 2020-02-10 NOTE — PROGRESS NOTES
(SPIRIVA RESPIMAT) 2.5 MCG/ACT AERS inhaler Inhale 2.5 mcg into the lungs daily       Pseudoephedrine HCl (SUDAFED PO) Take by mouth as needed       OXYGEN Inhale 2 L/m2/min into the lungs nightly As needed during the day      theophylline (THEODUR) 300 MG extended release tablet Take 300 mg by mouth 2 times daily      Roflumilast (DALIRESP) 500 MCG tablet Take 500 mcg by mouth daily 30 tablet 3    albuterol (PROVENTIL;VENTOLIN) 90 MCG/ACT inhaler Inhale 2 puffs into the lungs every 6 hours as needed.  ipratropium-albuterol (DUONEB) 0.5-2.5 (3) MG/3ML SOLN nebulizer solution Inhale 1 vial into the lungs every 4 hours.  Montelukast Sodium (SINGULAIR PO) Take 10 mg by mouth nightly       Fluticasone-Salmeterol (ADVAIR DISKUS IN) Inhale into the lungs 2 times daily       nitroGLYCERIN (NITROSTAT) 0.4 MG SL tablet Place 1 tablet under the tongue every 5 minutes as needed for Chest pain up to max of 3 total doses. If no relief after 1 dose, call 911. (Patient not taking: Reported on 2019) 25 tablet 3     No current facility-administered medications for this visit.           Social History     Socioeconomic History    Marital status:      Spouse name: Not on file    Number of children: Not on file    Years of education: Not on file    Highest education level: Not on file   Occupational History    Not on file   Social Needs    Financial resource strain: Not on file    Food insecurity:     Worry: Not on file     Inability: Not on file    Transportation needs:     Medical: Not on file     Non-medical: Not on file   Tobacco Use    Smoking status: Former Smoker     Packs/day: 1.50     Years: 40.00     Pack years: 60.00     Last attempt to quit: 2015     Years since quittin.1    Smokeless tobacco: Never Used   Substance and Sexual Activity    Alcohol use: No    Drug use: No    Sexual activity: Yes     Partners: Male   Lifestyle    Physical activity:     Days per week: Not on vaginal discharge. Neurological: Positive for headaches. Negative for dizziness and light-headedness. OBJECTIVE:  /70   Pulse 84   Temp 97.9 °F (36.6 °C) (Oral)   Ht 5' 1\" (1.549 m)   Wt 202 lb 12.8 oz (92 kg)   LMP  (LMP Unknown)   SpO2 93%   Breastfeeding No   BMI 38.32 kg/m²      Body mass index is 38.32 kg/m². Physical Exam  Vitals signs and nursing note reviewed. Constitutional:       General: She is not in acute distress. Appearance: Normal appearance. She is well-developed. She is obese. Comments: Obese    HENT:      Head: Normocephalic and atraumatic. Right Ear: Tympanic membrane and ear canal normal.      Left Ear: Tympanic membrane and ear canal normal.      Ears:      Comments: Frontal sinus tenderness     Nose: No congestion. Mouth/Throat:      Mouth: Mucous membranes are dry. Pharynx: Oropharynx is clear. Posterior oropharyngeal erythema present. Comments: Redness of tongue and back of throat  Eyes:      Conjunctiva/sclera: Conjunctivae normal.      Comments:     Neck:      Musculoskeletal: Neck supple. Thyroid: No thyromegaly. Vascular: No carotid bruit or JVD. Comments: No jvd  Cardiovascular:      Rate and Rhythm: Normal rate and regular rhythm. Heart sounds: Normal heart sounds. No murmur. No friction rub. No gallop. Pulmonary:      Effort: Pulmonary effort is normal.      Breath sounds: Wheezing and rhonchi present. Comments: Decreased BS   Chest:      Chest wall: No tenderness. Abdominal:      General: There is no distension. Palpations: Abdomen is soft. Tenderness: There is no abdominal tenderness. Comments: No HSM    Musculoskeletal:         General: No swelling. Lymphadenopathy:      Cervical: No cervical adenopathy. Skin:     General: Skin is warm and dry. Neurological:      General: No focal deficit present. Mental Status: She is alert and oriented to person, place, and time.

## 2020-02-23 ASSESSMENT — ENCOUNTER SYMPTOMS
ABDOMINAL PAIN: 0
VOMITING: 0
WHEEZING: 1
NAUSEA: 0
SINUS PRESSURE: 1
RHINORRHEA: 1
DIARRHEA: 0
SHORTNESS OF BREATH: 1
COUGH: 1

## 2020-02-27 RX ORDER — ZOLPIDEM TARTRATE 10 MG/1
10 TABLET ORAL NIGHTLY PRN
Qty: 90 TABLET | Refills: 0 | Status: SHIPPED | OUTPATIENT
Start: 2020-02-27 | End: 2020-05-20 | Stop reason: ALTCHOICE

## 2020-04-13 RX ORDER — LEVOTHYROXINE SODIUM 88 UG/1
TABLET ORAL
Qty: 90 TABLET | Refills: 1 | Status: SHIPPED | OUTPATIENT
Start: 2020-04-13 | End: 2020-10-12

## 2020-05-20 ENCOUNTER — VIRTUAL VISIT (OUTPATIENT)
Dept: FAMILY MEDICINE CLINIC | Age: 70
End: 2020-05-20
Payer: MEDICARE

## 2020-05-20 PROCEDURE — G8427 DOCREV CUR MEDS BY ELIG CLIN: HCPCS | Performed by: INTERNAL MEDICINE

## 2020-05-20 PROCEDURE — 3017F COLORECTAL CA SCREEN DOC REV: CPT | Performed by: INTERNAL MEDICINE

## 2020-05-20 PROCEDURE — 1090F PRES/ABSN URINE INCON ASSESS: CPT | Performed by: INTERNAL MEDICINE

## 2020-05-20 PROCEDURE — 1123F ACP DISCUSS/DSCN MKR DOCD: CPT | Performed by: INTERNAL MEDICINE

## 2020-05-20 PROCEDURE — 4040F PNEUMOC VAC/ADMIN/RCVD: CPT | Performed by: INTERNAL MEDICINE

## 2020-05-20 PROCEDURE — G8400 PT W/DXA NO RESULTS DOC: HCPCS | Performed by: INTERNAL MEDICINE

## 2020-05-20 PROCEDURE — 99212 OFFICE O/P EST SF 10 MIN: CPT | Performed by: INTERNAL MEDICINE

## 2020-05-20 RX ORDER — ZOLPIDEM TARTRATE 10 MG/1
12.5 TABLET ORAL NIGHTLY PRN
Qty: 90 TABLET | OUTPATIENT
Start: 2020-05-20 | End: 2020-08-18

## 2020-05-20 RX ORDER — ZOLPIDEM TARTRATE 12.5 MG/1
12.5 TABLET, FILM COATED, EXTENDED RELEASE ORAL NIGHTLY PRN
Qty: 30 TABLET | Refills: 0 | Status: SHIPPED | OUTPATIENT
Start: 2020-05-20 | End: 2020-05-26

## 2020-05-20 NOTE — PROGRESS NOTES
Shawn Eagle is a 81 y.o. female being evaluated by a Virtual Visit (video visit) encounter to address concerns as mentioned above. A caregiver was present when appropriate. Due to this being a TeleHealth encounter (During KGTOE-75 public health emergency), evaluation of the following organ systems was limited: Vitals/Constitutional/EENT/Resp/CV/GI//MS/Neuro/Skin/Heme-Lymph-Imm. Pursuant to the emergency declaration under the [address] [place] [address] and the [employer] Act, this Virtual Visit was conducted with patient's (and/or legal guardian's) consent, to reduce the patient's risk of exposure to COVID-19 and provide necessary medical care. The patient (and/or legal guardian) has also been advised to contact this office for worsening conditions or problems, and seek emergency medical treatment and/or call 911 if deemed necessary. Patient identification was verified at the start of the visit: Yes    Total time spent for this encounter: Not billed by time    Services were provided through a video synchronous discussion virtually to substitute for in-person clinic visit. Patient and provider were located at their individual homes. --Dallas Chavarria MD on 5/24/2020 at 12:52 PM    An electronic signature was used to authenticate this note. SUBJECTIVE:  Shawn Eagle is a 81 y.o. female being evaluated for:    Chief Complaint   Patient presents with    Insomnia     currently taking Ambien 10mg qhs, no longer working for her       HPI   Has been having trouble sleeping for the last 6 months More trouble falling asleep /can take 5 to 6 hours to go to sleep. Her son switched to Ambien CR and this helped.   He is always had trouble going to sleep    Allergies   Allergen Reactions    Atarax [Hydroxyzine] Hives    Codeine Other (See Comments)     Makes her feel hyper    Sulfa Antibiotics Nausea Only   

## 2020-05-22 ENCOUNTER — TELEPHONE (OUTPATIENT)
Dept: FAMILY MEDICINE CLINIC | Age: 70
End: 2020-05-22

## 2020-05-22 NOTE — TELEPHONE ENCOUNTER
Pt stated that the insurance company sent over a PA form for Ambien and they have not received it back.      Pl advise [phone] (home)

## 2020-05-24 ASSESSMENT — ENCOUNTER SYMPTOMS
VOMITING: 0
NAUSEA: 0
ABDOMINAL PAIN: 0
WHEEZING: 0
DIARRHEA: 0
SHORTNESS OF BREATH: 1
SORE THROAT: 0
COUGH: 1

## 2020-05-26 RX ORDER — CITALOPRAM 20 MG/1
TABLET ORAL
Qty: 90 TABLET | Refills: 1 | Status: SHIPPED | OUTPATIENT
Start: 2020-05-26 | End: 2020-10-05

## 2020-05-26 RX ORDER — ZOLPIDEM TARTRATE 10 MG/1
10 TABLET ORAL NIGHTLY PRN
Qty: 90 TABLET | Refills: 0 | Status: SHIPPED | OUTPATIENT
Start: 2020-05-26 | End: 2020-08-11 | Stop reason: SDUPTHER

## 2020-05-26 NOTE — TELEPHONE ENCOUNTER
Left message for pt to return call @ [phone]     Does she want to try Trazodone or Belsomra? If she does not want to try one of the formulary alternatives that insurance will cover, she can go to [facility] and look up a coupon code, this will only work for pharmacies like [facility], no coupons available for [facility]. Coupon does not have to be printed she can let us know what pharmacy she wants Rx to go to and after rx is sent she can give coupon info over the phone to the pharmacy. I found a coupon on [employer] for [employer] for $23.42 for 1 month supply, coupon does not  and she can use this every month they will keep it on file.

## 2020-05-28 RX ORDER — PREDNISONE 20 MG/1
TABLET ORAL
Qty: 20 TABLET | Refills: 0 | OUTPATIENT
Start: 2020-05-28

## 2020-05-28 NOTE — TELEPHONE ENCOUNTER
Received a request for a prednisone taper.   Call patient and see if she is doing okay if she requested this so she is having breathing problems etc.

## 2020-06-22 ENCOUNTER — TELEPHONE (OUTPATIENT)
Dept: FAMILY MEDICINE CLINIC | Age: 70
End: 2020-06-22

## 2020-06-22 RX ORDER — CEFUROXIME AXETIL 250 MG/1
250 TABLET ORAL 2 TIMES DAILY
Qty: 20 TABLET | Refills: 0 | Status: SHIPPED | OUTPATIENT
Start: 2020-06-22 | End: 2020-07-02

## 2020-06-22 NOTE — TELEPHONE ENCOUNTER
Pt stated that about 5 min ago her Ear started  throbbing   And the back of her ear is so sore that she cant touch it. Would like to know if she could be prescribed something     Pl advise.    [phone]

## 2020-08-03 ENCOUNTER — OFFICE VISIT (OUTPATIENT)
Dept: FAMILY MEDICINE CLINIC | Age: 70
End: 2020-08-03
Payer: MEDICARE

## 2020-08-03 VITALS
SYSTOLIC BLOOD PRESSURE: 124 MMHG | WEIGHT: 206.2 LBS | OXYGEN SATURATION: 98 % | TEMPERATURE: 97.9 F | DIASTOLIC BLOOD PRESSURE: 82 MMHG | BODY MASS INDEX: 38.93 KG/M2 | HEART RATE: 75 BPM | HEIGHT: 61 IN

## 2020-08-03 PROCEDURE — G8427 DOCREV CUR MEDS BY ELIG CLIN: HCPCS | Performed by: INTERNAL MEDICINE

## 2020-08-03 PROCEDURE — 1123F ACP DISCUSS/DSCN MKR DOCD: CPT | Performed by: INTERNAL MEDICINE

## 2020-08-03 PROCEDURE — 4040F PNEUMOC VAC/ADMIN/RCVD: CPT | Performed by: INTERNAL MEDICINE

## 2020-08-03 PROCEDURE — 1036F TOBACCO NON-USER: CPT | Performed by: INTERNAL MEDICINE

## 2020-08-03 PROCEDURE — 3023F SPIROM DOC REV: CPT | Performed by: INTERNAL MEDICINE

## 2020-08-03 PROCEDURE — G8926 SPIRO NO PERF OR DOC: HCPCS | Performed by: INTERNAL MEDICINE

## 2020-08-03 PROCEDURE — 3017F COLORECTAL CA SCREEN DOC REV: CPT | Performed by: INTERNAL MEDICINE

## 2020-08-03 PROCEDURE — G8417 CALC BMI ABV UP PARAM F/U: HCPCS | Performed by: INTERNAL MEDICINE

## 2020-08-03 PROCEDURE — 1090F PRES/ABSN URINE INCON ASSESS: CPT | Performed by: INTERNAL MEDICINE

## 2020-08-03 PROCEDURE — G8400 PT W/DXA NO RESULTS DOC: HCPCS | Performed by: INTERNAL MEDICINE

## 2020-08-03 PROCEDURE — 99214 OFFICE O/P EST MOD 30 MIN: CPT | Performed by: INTERNAL MEDICINE

## 2020-08-03 RX ORDER — ASPIRIN 81 MG/1
81 TABLET ORAL DAILY
Qty: 30 TABLET | Refills: 0 | COMMUNITY
Start: 2020-08-03

## 2020-08-03 RX ORDER — AMOXICILLIN 875 MG/1
875 TABLET, COATED ORAL 2 TIMES DAILY
Qty: 20 TABLET | Refills: 0 | Status: SHIPPED | OUTPATIENT
Start: 2020-08-03 | End: 2020-08-13

## 2020-08-03 RX ORDER — MECLIZINE HCL 12.5 MG/1
12.5 TABLET ORAL 3 TIMES DAILY PRN
Qty: 30 TABLET | Refills: 1 | Status: SHIPPED | OUTPATIENT
Start: 2020-08-03 | End: 2020-08-23

## 2020-08-03 NOTE — PROGRESS NOTES
SUBJECTIVE:  Amandeep Lomeli is a 81 y.o. female being evaluated for:    Chief Complaint   Patient presents with    Fatigue     felt like legs couldnt hold her. HPI   Incident last nigh   Room felt as though it was tilited  Trouble walking  [place] wobbly  NO falls  Weak kneed and legs would not hold her up  Got better on its own  Bp was okay Oxygen was fine  Pulse was up but admits to being scared Both ears hurting and tylenol helped  SLight congesion  In both head and chest  Clogged up and using sudafed  NO other neurologic causes    Trouble with memoroy  Trouble getting words out  Mind goes blank    Allergies   Allergen Reactions    Atarax [Hydroxyzine] Hives    Codeine Other (See Comments)     Makes her feel hyper    Sulfa Antibiotics Nausea Only    Zithromax [Azithromycin]      Itching and burning     Current Outpatient Medications   Medication Sig Dispense Refill    meclizine (ANTIVERT) 12.5 MG tablet Take 1 tablet by mouth 3 times daily as needed for Dizziness 30 tablet 1    amoxicillin (AMOXIL) 875 MG tablet Take 1 tablet by mouth 2 times daily for 10 days 20 tablet 0    aspirin EC 81 MG EC tablet Take 1 tablet by mouth daily 30 tablet 0    citalopram (CELEXA) 20 MG tablet TAKE 1 TABLET EVERY DAY 90 tablet 1    zolpidem (AMBIEN) 10 MG tablet Take 1 tablet by mouth nightly as needed for Sleep for up to 90 days.  90 tablet 0    metoprolol tartrate (LOPRESSOR) 25 MG tablet TAKE 1 TABLET TWICE DAILY      levothyroxine (SYNTHROID) 88 MCG tablet TAKE 1 TABLET EVERY DAY 90 tablet 1    gabapentin (NEURONTIN) 600 MG tablet TAKE 1 TABLET THREE TIMES DAILY    270 tablet 0    RESTASIS 0.05 % ophthalmic emulsion Place 1 drop into both eyes 2 times daily       meloxicam (MOBIC) 15 MG tablet       Multiple Vitamins-Minerals (CENTRUM ADULTS PO) Take by mouth daily      nitroGLYCERIN (NITROSTAT) 0.4 MG SL tablet Place 1 tablet under the tongue every 5 minutes as needed for Chest pain up to max of 3 total doses. If no relief after 1 dose, call 911. 25 tablet 3    tiotropium (SPIRIVA RESPIMAT) 2.5 MCG/ACT AERS inhaler Inhale 2.5 mcg into the lungs daily       Pseudoephedrine HCl (SUDAFED PO) Take by mouth as needed       OXYGEN Inhale 2 L/m2/min into the lungs nightly As needed during the day      theophylline (THEODUR) 300 MG extended release tablet Take 300 mg by mouth 2 times daily      Roflumilast (DALIRESP) 500 MCG tablet Take 500 mcg by mouth daily 30 tablet 3    albuterol (PROVENTIL;VENTOLIN) 90 MCG/ACT inhaler Inhale 2 puffs into the lungs every 6 hours as needed.  ipratropium-albuterol (DUONEB) 0.5-2.5 (3) MG/3ML SOLN nebulizer solution Inhale 1 vial into the lungs every 4 hours.  Montelukast Sodium (SINGULAIR PO) Take 10 mg by mouth nightly       Fluticasone-Salmeterol (ADVAIR DISKUS IN) Inhale into the lungs 2 times daily        No current facility-administered medications for this visit.           Social History     Socioeconomic History    Marital status:      Spouse name: Not on file    Number of children: Not on file    Years of education: Not on file    Highest education level: Not on file   Occupational History    Not on file   Social Needs    Financial resource strain: Not on file    Food insecurity     Worry: Not on file     Inability: Not on file    Transportation needs     Medical: Not on file     Non-medical: Not on file   Tobacco Use    Smoking status: Former Smoker     Packs/day: 1.50     Years: 40.00     Pack years: 60.00     Last attempt to quit: 2015     Years since quittin.6    Smokeless tobacco: Never Used   Substance and Sexual Activity    Alcohol use: No    Drug use: No    Sexual activity: Yes     Partners: Male   Lifestyle    Physical activity     Days per week: Not on file     Minutes per session: Not on file    Stress: Not on file   Relationships    Social connections     Talks on phone: Not on file     Gets together: Not on file Attends Orthodox service: Not on file     Active member of club or organization: Not on file     Attends meetings of clubs or organizations: Not on file     Relationship status: Not on file    Intimate partner violence     Fear of current or ex partner: Not on file     Emotionally abused: Not on file     Physically abused: Not on file     Forced sexual activity: Not on file   Other Topics Concern    Not on file   Social History Narrative    Not on file      Past Medical History:   Diagnosis Date    Adrenal adenoma 12/07    left    Allergic rhinitis     Asthma     Cervical disc disorder with radiculopathy 1994    COPD [facility]ronic obstructive [facility] disease) ([facility]     Deviated nasal septum 1989    s/p septoplasty    Diverticulosis     h/o abscess  s/p colon resection 11/07 Dr. Cleo Carrillo Endocervical polyp 2/2006    sguamam metoplasia    Endometrial polyps 2/06    s hyperplasia    Grave's disease     Graves disease 11/97    s/p SCHMITT now hypothroid    Headache(784.0)     Herniated discs 1997    l5-s1 with DDD    HIGH CHOLESTEROL 6/09    ldl    OA (osteoarthritis of the spine)     anterior wedgiy    Obesity     Osteopenia 1/2004    spine    Pneumonia 2001    hospitalized    Postherpetic neuralgia 6/23/2014    Tear meniscus knee     right    Tobacco abuse     Ulcer gastric 1980    Urticaria      No past surgical history on file. Review of Systems   Constitutional: Negative for chills and fever. HENT: Positive for congestion, ear pain and sinus pressure. Negative for sore throat. Respiratory: Positive for cough. Negative for shortness of breath and wheezing. Cardiovascular: Negative for chest pain, palpitations and leg swelling. Gastrointestinal: Negative for abdominal pain, diarrhea, nausea and vomiting. Genitourinary: Negative for dysuria. Musculoskeletal: Negative for neck pain.    Neurological: Positive for dizziness, speech difficulty, weakness (Lower extremities) and light-headedness. Negative for tremors, syncope and headaches. Psychiatric/Behavioral: Positive for confusion. OBJECTIVE:  /82 (Site: Left Upper Arm, Position: Sitting, Cuff Size: Medium Adult)   Pulse 75   Temp 97.9 °F (36.6 °C) (Oral)   Ht 5' 1\" (1.549 m)   Wt 206 lb 3.2 oz (93.5 kg)   LMP  (LMP Unknown)   SpO2 98%   BMI 38.96 kg/m²      Body mass index is 38.96 kg/m². Physical Exam  Constitutional:       General: She is not in acute distress. Appearance: Normal appearance. She is obese. HENT:      Head: Normocephalic and atraumatic. Comments: Both tympanic membranes appear retracted     Right Ear: Ear canal and external ear normal.      Left Ear: Ear canal and external ear normal.      Nose: Congestion present. Mouth/Throat:      Pharynx: Oropharynx is clear. Eyes:      Conjunctiva/sclera: Conjunctivae normal.   Neck:      Musculoskeletal: Neck supple. No muscular tenderness. Vascular: No carotid bruit. Cardiovascular:      Rate and Rhythm: Normal rate and regular rhythm. Heart sounds: Normal heart sounds. No murmur. No friction rub. No gallop. Pulmonary:      Effort: Pulmonary effort is normal.      Breath sounds: No wheezing. Comments: Decreased breath sounds good for patient  Chest:      Chest wall: No tenderness. Abdominal:      General: There is no distension. Palpations: Abdomen is soft. Tenderness: There is no abdominal tenderness. Comments: No hepatosplenomegaly   Musculoskeletal:         General: No swelling. Lymphadenopathy:      Cervical: No cervical adenopathy. Skin:     General: Skin is warm and dry. Findings: No rash. Neurological:      General: No focal deficit present. Mental Status: She is alert. Cranial Nerves: No cranial nerve deficit. Gait: Gait normal.   Psychiatric:         Behavior: Behavior normal.         Thought Content:  Thought content normal.         ASSESSMENT/PLAN:    Patricia Lim was seen today for fatigue. Diagnoses and all orders for this visit:    Apraxia  -     CT HEAD WO CONTRAST; Future  -     aspirin EC 81 MG EC tablet; Take 1 tablet by mouth daily    Vertigo  -     CT HEAD WO CONTRAST; Future  -     meclizine (ANTIVERT) 12.5 MG tablet; Take 1 tablet by mouth 3 times daily as needed for Dizziness    Acute suppurative otitis media of both ears without spontaneous rupture of tympanic membranes, recurrence not specified  -     amoxicillin (AMOXIL) 875 MG tablet; Take 1 tablet by mouth 2 times daily for 10 days    Chronic obstructive pulmonary disease, unspecified [facility] ([facility] doing better, no acute problems        Orders Placed This Encounter   Medications    meclizine (ANTIVERT) 12.5 MG tablet     Sig: Take 1 tablet by mouth 3 times daily as needed for Dizziness     Dispense:  30 tablet     Refill:  1    amoxicillin (AMOXIL) 875 MG tablet     Sig: Take 1 tablet by mouth 2 times daily for 10 days     Dispense:  20 tablet     Refill:  0    aspirin EC 81 MG EC tablet     Sig: Take 1 tablet by mouth daily     Dispense:  30 tablet     Refill:  0        Return in 3 months (on 11/3/2020), or if symptoms worsen or fail to improve. There are no Patient Instructions on file for this visit.

## 2020-08-06 ENCOUNTER — TELEPHONE (OUTPATIENT)
Dept: FAMILY MEDICINE CLINIC | Age: 70
End: 2020-08-06

## 2020-08-06 NOTE — TELEPHONE ENCOUNTER
Called ins spoke with Tricia Cary he said that we need to contact [employer] for auth, he transferred the call and I spoke with Susen Fabry  Ref# [id number]    [facility] spoke with Susen Fabry the procedure is approved but would need to talk with [address] [place] [address] whom Paulie Bello is contracted with to get an approval to have done at [facility] and not at a [address] [place] [address] facility. Tracking # is [id number]  [facility] # [phone]   I was transferred to [address] [place] [address] spoke with [address] facility was approved CS# [id number]    Will receive Auth# by fax.      Authorization received and faxed to [employer]

## 2020-08-06 NOTE — TELEPHONE ENCOUNTER
Quynh with [facility] department calling, radiologist recommends patient have a follow up CT Chest  In August.  Last one was 2/4/20 and recommends 6 months recheck to watch the nodule.     Any questions Quynh [phone]

## 2020-08-09 ASSESSMENT — ENCOUNTER SYMPTOMS
COUGH: 1
SHORTNESS OF BREATH: 0
DIARRHEA: 0
SINUS PRESSURE: 1
ABDOMINAL PAIN: 0
SORE THROAT: 0
WHEEZING: 0
VOMITING: 0
NAUSEA: 0

## 2020-08-11 DIAGNOSIS — F51.01 PRIMARY INSOMNIA: ICD-10-CM

## 2020-08-11 RX ORDER — ZOLPIDEM TARTRATE 10 MG/1
10 TABLET ORAL NIGHTLY PRN
Qty: 90 TABLET | Refills: 0 | Status: SHIPPED | OUTPATIENT
Start: 2020-08-11 | End: 2020-11-02 | Stop reason: SDUPTHER

## 2020-08-11 RX ORDER — LEVOFLOXACIN 500 MG/1
500 TABLET, FILM COATED ORAL DAILY
Qty: 10 TABLET | Refills: 0 | Status: SHIPPED | OUTPATIENT
Start: 2020-08-11 | End: 2020-08-21

## 2020-08-11 NOTE — TELEPHONE ENCOUNTER
Chi  [phone] spoke with Nancy Stapleton CT Chest WO Contrast CPT 04526, Criteria met, will send authorization via fax. Tracking  # [id number]    [address] #[phone]  Need Approval to use [facility] [place] [facility]) facility  Spoke with Prudence Esteban went over all information for the patient and testing to be done. Transferred to Eliana Varela to get an override to use a non [facility] imagining facility. Facility approved. Will receive a fax with approval information.     Once approval received will fax order and auth # to [place]. [phone]

## 2020-08-19 DIAGNOSIS — R91.1 PULMONARY NODULE: ICD-10-CM

## 2020-08-19 DIAGNOSIS — R48.2 APRAXIA: ICD-10-CM

## 2020-08-19 DIAGNOSIS — R42 VERTIGO: ICD-10-CM

## 2020-08-20 ENCOUNTER — TELEPHONE (OUTPATIENT)
Dept: FAMILY MEDICINE CLINIC | Age: 70
End: 2020-08-20

## 2020-08-20 NOTE — TELEPHONE ENCOUNTER
----- Message from Anastacia Garza MD sent at 8/20/2020  7:56 AM EDT -----  Regarding: CT scan  CT scan of chest is normal all nodules that were there before are now gone.   Are likely inflammatory

## 2020-08-20 NOTE — TELEPHONE ENCOUNTER
----- Message from Antonio Campos MD sent at 8/20/2020  7:56 AM EDT -----  Regarding: CT scan  CT scan of chest is normal all nodules that were there before are now gone.   Are likely inflammatory

## 2020-08-20 NOTE — TELEPHONE ENCOUNTER
Patient informed of results. She is not having anymore dizzy spells only the 2 times.    Patient got a letter in the mail from [facility] and it gave names of some neurologist, when she gets home she'll look at it again and let us know of a name to put in a referral.

## 2020-08-24 ENCOUNTER — TELEPHONE (OUTPATIENT)
Dept: FAMILY MEDICINE CLINIC | Age: 70
End: 2020-08-24

## 2020-08-24 RX ORDER — METHYLPREDNISOLONE 4 MG/1
TABLET ORAL
Qty: 1 KIT | Refills: 0 | Status: SHIPPED | OUTPATIENT
Start: 2020-08-24 | End: 2020-08-30

## 2020-08-24 RX ORDER — LEVOFLOXACIN 500 MG/1
500 TABLET, FILM COATED ORAL DAILY
Qty: 10 TABLET | Refills: 0 | Status: SHIPPED | OUTPATIENT
Start: 2020-08-24 | End: 2020-09-03

## 2020-09-16 ENCOUNTER — NURSE ONLY (OUTPATIENT)
Dept: FAMILY MEDICINE CLINIC | Age: 70
End: 2020-09-16
Payer: MEDICARE

## 2020-09-16 PROCEDURE — 90694 VACC AIIV4 NO PRSRV 0.5ML IM: CPT | Performed by: INTERNAL MEDICINE

## 2020-09-16 PROCEDURE — G0008 ADMIN INFLUENZA VIRUS VAC: HCPCS | Performed by: INTERNAL MEDICINE

## 2020-09-16 NOTE — PROGRESS NOTES
Vaccine Information Sheet, \"Influenza - Inactivated\"  given to Olena Spencer, or parent/legal guardian of  Fatuma Spencer and verbalized understanding. Patient responses:    Have you ever had a reaction to a flu vaccine? No  Do you have any current illness? No  Have you ever had Guillian [place] Syndrome? No  Do you have a serious allergy to any of the follow: Neomycin, Polymyxin, Thimerosal, eggs or egg products? No    Flu vaccine given per order. Please see immunization tab. Risks and benefits explained. Current VIS given.

## 2020-10-05 RX ORDER — CITALOPRAM 20 MG/1
TABLET ORAL
Qty: 90 TABLET | Refills: 1 | Status: SHIPPED | OUTPATIENT
Start: 2020-10-05 | End: 2021-02-18

## 2020-10-08 ENCOUNTER — TELEPHONE (OUTPATIENT)
Dept: FAMILY MEDICINE CLINIC | Age: 70
End: 2020-10-08

## 2020-10-08 RX ORDER — OFLOXACIN 3 MG/ML
5 SOLUTION AURICULAR (OTIC) 2 TIMES DAILY
Qty: 5 ML | Refills: 0 | Status: SHIPPED | OUTPATIENT
Start: 2020-10-08 | End: 2020-10-18

## 2020-10-08 RX ORDER — AMOXICILLIN 875 MG/1
875 TABLET, COATED ORAL 2 TIMES DAILY
Qty: 20 TABLET | Refills: 0 | Status: SHIPPED | OUTPATIENT
Start: 2020-10-08 | End: 2020-10-18

## 2020-10-12 RX ORDER — LEVOTHYROXINE SODIUM 88 UG/1
TABLET ORAL
Qty: 90 TABLET | Refills: 1 | Status: SHIPPED | OUTPATIENT
Start: 2020-10-12 | End: 2021-02-18

## 2020-11-02 RX ORDER — ZOLPIDEM TARTRATE 10 MG/1
10 TABLET ORAL NIGHTLY PRN
Qty: 90 TABLET | Refills: 0 | Status: SHIPPED | OUTPATIENT
Start: 2020-11-02 | End: 2021-01-26 | Stop reason: SDUPTHER

## 2020-11-09 ENCOUNTER — TELEPHONE (OUTPATIENT)
Dept: FAMILY MEDICINE CLINIC | Age: 70
End: 2020-11-09

## 2020-11-09 RX ORDER — LEVOFLOXACIN 500 MG/1
500 TABLET, FILM COATED ORAL DAILY
Qty: 10 TABLET | Refills: 0 | Status: SHIPPED | OUTPATIENT
Start: 2020-11-09 | End: 2020-11-19

## 2020-11-09 NOTE — TELEPHONE ENCOUNTER
Yes using inhaler, nebulizer 3x per day, breathing is stable, no steroids needed. Send rx to [facility].  grace

## 2020-11-09 NOTE — TELEPHONE ENCOUNTER
Will call in a dose of Levaquin.   Question if she needs steroids question she is using her inhaler, please make sure her breathing is stable, not sure where she wants us to go question [place]   Please send

## 2020-11-09 NOTE — TELEPHONE ENCOUNTER
Pt calling having issues with sinus and possible Bronchitis. Would like something called in.     [phone]

## 2020-11-11 DIAGNOSIS — J44.9 CHRONIC OBSTRUCTIVE PULMONARY DISEASE, UNSPECIFIED COPD TYPE (HCC): ICD-10-CM

## 2020-11-11 DIAGNOSIS — J06.9 VIRAL URI: Primary | ICD-10-CM

## 2020-11-16 ENCOUNTER — TELEPHONE (OUTPATIENT)
Dept: FAMILY MEDICINE CLINIC | Age: 70
End: 2020-11-16

## 2020-11-16 RX ORDER — ALPRAZOLAM 0.25 MG/1
0.25 TABLET ORAL 2 TIMES DAILY PRN
Qty: 14 TABLET | Refills: 0 | Status: SHIPPED | OUTPATIENT
Start: 2020-11-16 | End: 2021-01-19 | Stop reason: SDUPTHER

## 2020-11-16 NOTE — TELEPHONE ENCOUNTER
To try to alleviate her fears I have had people with bad COPD that have been hospitalized for Covid only for day or 2 and have done fine. Course there is no guarantee but it is not nearly as bad as it was back when it first started coasters treatment now. I can call her in something short-term low-dose for anxiety but I will only do low doses anxiety meds actually worsen breathing.   Of course if you have any symptoms we want to see you do a video visit etc.  And if gets really bad to go the ER

## 2020-11-16 NOTE — TELEPHONE ENCOUNTER
Patient and spouse have been around their son, and he was tested Thursday and Saturday it came back positive. They don't have any current symptoms but she is very nervous due to having copd. She is asking if you can call something in to calm her nerves to [place].       Please call, [phone]

## 2020-12-04 ENCOUNTER — TELEPHONE (OUTPATIENT)
Dept: FAMILY MEDICINE CLINIC | Age: 70
End: 2020-12-04

## 2020-12-04 RX ORDER — DOXYCYCLINE HYCLATE 100 MG/1
100 CAPSULE ORAL 2 TIMES DAILY
Qty: 20 CAPSULE | Refills: 0 | Status: SHIPPED | OUTPATIENT
Start: 2020-12-04 | End: 2020-12-14

## 2020-12-04 RX ORDER — BENZONATATE 200 MG/1
200 CAPSULE ORAL 3 TIMES DAILY PRN
Qty: 21 CAPSULE | Refills: 0 | Status: SHIPPED | OUTPATIENT
Start: 2020-12-04 | End: 2020-12-11

## 2020-12-04 NOTE — TELEPHONE ENCOUNTER
Pt would like to know if she could be prescribed     Pt has an URI   Chest congestion   Wheezing   Sinus drainage     Pl advise.    Sim Hernandez

## 2021-01-19 ENCOUNTER — TELEPHONE (OUTPATIENT)
Dept: FAMILY MEDICINE CLINIC | Age: 71
End: 2021-01-19

## 2021-01-19 DIAGNOSIS — F41.8 ANXIETY ABOUT HEALTH: ICD-10-CM

## 2021-01-19 RX ORDER — ALPRAZOLAM 0.25 MG/1
0.25 TABLET ORAL 2 TIMES DAILY PRN
Qty: 14 TABLET | Refills: 0 | Status: SHIPPED | OUTPATIENT
Start: 2021-01-19 | End: 2021-01-26

## 2021-01-19 NOTE — TELEPHONE ENCOUNTER
Looks like he had stents placed correct? We will give you some Xanax again as we did before November.   If continued need will need a visit, as we have not seen you since the summer

## 2021-01-26 ENCOUNTER — TELEPHONE (OUTPATIENT)
Dept: FAMILY MEDICINE CLINIC | Age: 71
End: 2021-01-26

## 2021-01-26 DIAGNOSIS — F51.01 PRIMARY INSOMNIA: ICD-10-CM

## 2021-01-26 RX ORDER — ZOLPIDEM TARTRATE 10 MG/1
10 TABLET ORAL NIGHTLY PRN
Qty: 90 TABLET | Refills: 0 | Status: SHIPPED | OUTPATIENT
Start: 2021-01-26 | End: 2021-04-12 | Stop reason: SDUPTHER

## 2021-02-02 ENCOUNTER — TELEPHONE (OUTPATIENT)
Dept: FAMILY MEDICINE CLINIC | Age: 71
End: 2021-02-02

## 2021-02-02 RX ORDER — AMOXICILLIN 875 MG/1
875 TABLET, COATED ORAL 2 TIMES DAILY
Qty: 20 TABLET | Refills: 0 | Status: SHIPPED | OUTPATIENT
Start: 2021-02-02 | End: 2021-02-12

## 2021-02-02 NOTE — TELEPHONE ENCOUNTER
Pt has a sore throat,wheezing and her COPD is \"acting up\". Pt wants to know if antibiotics can be prescribed? [facility] in [place].

## 2021-02-18 DIAGNOSIS — R73.9 HYPERGLYCEMIA: ICD-10-CM

## 2021-02-18 DIAGNOSIS — J44.9 CHRONIC OBSTRUCTIVE PULMONARY DISEASE, UNSPECIFIED COPD TYPE (HCC): Primary | ICD-10-CM

## 2021-02-18 DIAGNOSIS — F41.9 ANXIETY AND DEPRESSION: ICD-10-CM

## 2021-02-18 DIAGNOSIS — E78.00 HYPERCHOLESTEREMIA: ICD-10-CM

## 2021-02-18 DIAGNOSIS — F32.A ANXIETY AND DEPRESSION: ICD-10-CM

## 2021-02-18 DIAGNOSIS — E03.9 ACQUIRED HYPOTHYROIDISM: ICD-10-CM

## 2021-02-18 DIAGNOSIS — I10 ESSENTIAL HYPERTENSION: ICD-10-CM

## 2021-02-18 RX ORDER — CITALOPRAM 20 MG/1
TABLET ORAL
Qty: 90 TABLET | Refills: 0 | Status: SHIPPED | OUTPATIENT
Start: 2021-02-18 | End: 2021-04-26

## 2021-02-18 RX ORDER — LEVOTHYROXINE SODIUM 88 UG/1
TABLET ORAL
Qty: 90 TABLET | Refills: 0 | Status: SHIPPED | OUTPATIENT
Start: 2021-02-18 | End: 2021-04-26

## 2021-02-18 NOTE — TELEPHONE ENCOUNTER
Pt advised. She had blood work done at [facility] today for Dr. Rayshawn Campbell and Dr. Kyle Silvestre, she is having results faxed to our office, will call back to find out what labs can be cancelled if there are any duplicate before getting more blood work done.

## 2021-03-03 ENCOUNTER — TELEPHONE (OUTPATIENT)
Dept: FAMILY MEDICINE CLINIC | Age: 71
End: 2021-03-03

## 2021-03-03 RX ORDER — DOXYCYCLINE HYCLATE 100 MG
100 TABLET ORAL 2 TIMES DAILY
Qty: 20 TABLET | Refills: 0 | Status: SHIPPED | OUTPATIENT
Start: 2021-03-03 | End: 2021-03-13

## 2021-03-03 NOTE — TELEPHONE ENCOUNTER
Assuming her vitals are fine no fevers I think she has. A pulse ox?   I can call in an antibiotic but if persisting probably needs to be seen

## 2021-03-03 NOTE — TELEPHONE ENCOUNTER
Pt thinks that she is getting bronchitis. Cough - dry   Using her nebulizer and inhalers   No fever   Sob - a little   Wheezing     Pl advise.    [phone]    [facility] [place]

## 2021-03-23 ENCOUNTER — TELEPHONE (OUTPATIENT)
Dept: FAMILY MEDICINE CLINIC | Age: 71
End: 2021-03-23

## 2021-03-23 RX ORDER — LEVOFLOXACIN 500 MG/1
500 TABLET, FILM COATED ORAL DAILY
Qty: 10 TABLET | Refills: 0 | Status: SHIPPED | OUTPATIENT
Start: 2021-03-23 | End: 2021-04-02

## 2021-03-23 RX ORDER — FLUCONAZOLE 100 MG/1
100 TABLET ORAL DAILY
Qty: 7 TABLET | Refills: 0 | Status: SHIPPED | OUTPATIENT
Start: 2021-03-23 | End: 2021-03-30

## 2021-03-23 NOTE — TELEPHONE ENCOUNTER
Pt informed diflucan sent in. Pt is currently on prednisone 20 mg from the eye dr and is requesting an antibiotic be sent in, the doxycycline didn't help much.

## 2021-03-23 NOTE — TELEPHONE ENCOUNTER
Pt is done w/ doxycycline but still has     Wheezing   Feels like she has a sinus infection, pressure under eyes   Using her nebulizer for the wheezing and copd      Thrust - tongue is white   Would like to know if she could be prescribed something for the thrush. Pt has the mouthwash rinse     [facility] [place]     Pt is unavailable to come in b/c ANTONIO () is still in the hospital and pretty bad off. Pl advise.    [phone]

## 2021-04-06 ENCOUNTER — TELEPHONE (OUTPATIENT)
Dept: FAMILY MEDICINE CLINIC | Age: 71
End: 2021-04-06

## 2021-04-06 RX ORDER — CEFUROXIME AXETIL 250 MG/1
250 TABLET ORAL 2 TIMES DAILY
Qty: 20 TABLET | Refills: 0 | Status: SHIPPED | OUTPATIENT
Start: 2021-04-06 | End: 2021-04-16

## 2021-04-06 NOTE — TELEPHONE ENCOUNTER
Patient calling back she has been taking prednisone 40 mg daily prescribed by [facility] for her eyes. Patient does not want to go to er, she did schedule appt with you next Thursday, she states now just not a good time with Augustina Leblanc being in rehab. She said if you want to call her in something send to [facility].

## 2021-04-12 ENCOUNTER — TELEPHONE (OUTPATIENT)
Dept: FAMILY MEDICINE CLINIC | Age: 71
End: 2021-04-12

## 2021-04-12 DIAGNOSIS — F51.01 PRIMARY INSOMNIA: ICD-10-CM

## 2021-04-12 RX ORDER — ZOLPIDEM TARTRATE 10 MG/1
10 TABLET ORAL NIGHTLY PRN
Qty: 90 TABLET | Refills: 0 | Status: SHIPPED | OUTPATIENT
Start: 2021-04-12 | End: 2021-07-01 | Stop reason: SDUPTHER

## 2021-04-12 NOTE — TELEPHONE ENCOUNTER
Let her know that we are very sorry for her loss and if she needs anything to let us know.   Did send in her Ambien request

## 2021-04-22 ENCOUNTER — TELEPHONE (OUTPATIENT)
Dept: FAMILY MEDICINE CLINIC | Age: 71
End: 2021-04-22

## 2021-04-22 ENCOUNTER — HOSPITAL ENCOUNTER (OUTPATIENT)
Age: 71
Discharge: HOME | End: 2021-04-22
Payer: COMMERCIAL

## 2021-04-22 ENCOUNTER — OFFICE VISIT (OUTPATIENT)
Dept: FAMILY MEDICINE CLINIC | Age: 71
End: 2021-04-22
Payer: MEDICARE

## 2021-04-22 ENCOUNTER — HOSPITAL ENCOUNTER (OUTPATIENT)
Dept: GENERAL RADIOLOGY | Age: 71
Discharge: HOME | End: 2021-04-22
Payer: COMMERCIAL

## 2021-04-22 VITALS
HEART RATE: 80 BPM | RESPIRATION RATE: 16 BRPM | OXYGEN SATURATION: 98 % | DIASTOLIC BLOOD PRESSURE: 84 MMHG | BODY MASS INDEX: 37.98 KG/M2 | SYSTOLIC BLOOD PRESSURE: 136 MMHG | TEMPERATURE: 97 F | WEIGHT: 201 LBS

## 2021-04-22 DIAGNOSIS — J45.901 ASTHMA EXACERBATION IN COPD (HCC): ICD-10-CM

## 2021-04-22 DIAGNOSIS — J44.1 ASTHMA EXACERBATION IN COPD (HCC): ICD-10-CM

## 2021-04-22 DIAGNOSIS — J44.9 CHRONIC OBSTRUCTIVE PULMONARY DISEASE, UNSPECIFIED COPD TYPE (HCC): ICD-10-CM

## 2021-04-22 DIAGNOSIS — F43.21 GRIEF: ICD-10-CM

## 2021-04-22 DIAGNOSIS — E78.00 HYPERCHOLESTEREMIA: ICD-10-CM

## 2021-04-22 DIAGNOSIS — J32.9 RECURRENT SINUSITIS: Primary | ICD-10-CM

## 2021-04-22 DIAGNOSIS — I10 ESSENTIAL HYPERTENSION: ICD-10-CM

## 2021-04-22 DIAGNOSIS — J32.9 RECURRENT SINUSITIS: ICD-10-CM

## 2021-04-22 DIAGNOSIS — E03.9 ACQUIRED HYPOTHYROIDISM: ICD-10-CM

## 2021-04-22 DIAGNOSIS — R73.9 HYPERGLYCEMIA: ICD-10-CM

## 2021-04-22 LAB
A/G RATIO: 1.9 (ref 1.1–2.2)
ALBUMIN SERPL-MCNC: 4.4 G/DL (ref 3.4–5)
ALP BLD-CCNC: 68 U/L (ref 40–129)
ALT SERPL-CCNC: 17 U/L (ref 10–40)
ANION GAP SERPL CALCULATED.3IONS-SCNC: 16 MMOL/L (ref 3–16)
AST SERPL-CCNC: 17 U/L (ref 15–37)
BASOPHILS ABSOLUTE: 0.1 K/UL (ref 0–0.2)
BASOPHILS RELATIVE PERCENT: 0.5 %
BILIRUB SERPL-MCNC: 0.6 MG/DL (ref 0–1)
BUN BLDV-MCNC: 11 MG/DL (ref 7–20)
CALCIUM SERPL-MCNC: 9.3 MG/DL (ref 8.3–10.6)
CHLORIDE BLD-SCNC: 98 MMOL/L (ref 99–110)
CHOLESTEROL, TOTAL: 226 MG/DL (ref 0–199)
CO2: 25 MMOL/L (ref 21–32)
CREAT SERPL-MCNC: 0.7 MG/DL (ref 0.6–1.2)
EOSINOPHILS ABSOLUTE: 0 K/UL (ref 0–0.6)
EOSINOPHILS RELATIVE PERCENT: 0.1 %
GFR AFRICAN AMERICAN: >60
GFR NON-AFRICAN AMERICAN: >60
GLOBULIN: 2.3 G/DL
GLUCOSE BLD-MCNC: 120 MG/DL (ref 70–99)
HCT VFR BLD CALC: 46.1 % (ref 36–48)
HDLC SERPL-MCNC: 72 MG/DL (ref 40–60)
HEMOGLOBIN: 15.4 G/DL (ref 12–16)
LDL CHOLESTEROL CALCULATED: 122 MG/DL
LYMPHOCYTES ABSOLUTE: 1.4 K/UL (ref 1–5.1)
LYMPHOCYTES RELATIVE PERCENT: 12.6 %
MCH RBC QN AUTO: 31.6 PG (ref 26–34)
MCHC RBC AUTO-ENTMCNC: 33.4 G/DL (ref 31–36)
MCV RBC AUTO: 94.6 FL (ref 80–100)
MONOCYTES ABSOLUTE: 0.4 K/UL (ref 0–1.3)
MONOCYTES RELATIVE PERCENT: 3.3 %
NEUTROPHILS ABSOLUTE: 9.3 K/UL (ref 1.7–7.7)
NEUTROPHILS RELATIVE PERCENT: 83.5 %
PDW BLD-RTO: 15.5 % (ref 12.4–15.4)
PLATELET # BLD: 268 K/UL (ref 135–450)
PMV BLD AUTO: 8.6 FL (ref 5–10.5)
POTASSIUM SERPL-SCNC: 4.1 MMOL/L (ref 3.5–5.1)
RBC # BLD: 4.88 M/UL (ref 4–5.2)
SODIUM BLD-SCNC: 139 MMOL/L (ref 136–145)
TOTAL PROTEIN: 6.7 G/DL (ref 6.4–8.2)
TRIGL SERPL-MCNC: 159 MG/DL (ref 0–150)
TSH SERPL DL<=0.05 MIU/L-ACNC: 5.4 UIU/ML (ref 0.27–4.2)
VLDLC SERPL CALC-MCNC: 32 MG/DL
WBC # BLD: 11.1 K/UL (ref 4–11)

## 2021-04-22 PROCEDURE — G8400 PT W/DXA NO RESULTS DOC: HCPCS | Performed by: INTERNAL MEDICINE

## 2021-04-22 PROCEDURE — 99213 OFFICE O/P EST LOW 20 MIN: CPT | Performed by: INTERNAL MEDICINE

## 2021-04-22 PROCEDURE — 1123F ACP DISCUSS/DSCN MKR DOCD: CPT | Performed by: INTERNAL MEDICINE

## 2021-04-22 PROCEDURE — G8417 CALC BMI ABV UP PARAM F/U: HCPCS | Performed by: INTERNAL MEDICINE

## 2021-04-22 PROCEDURE — G8926 SPIRO NO PERF OR DOC: HCPCS | Performed by: INTERNAL MEDICINE

## 2021-04-22 PROCEDURE — 70220 X-RAY EXAM OF SINUSES: CPT

## 2021-04-22 PROCEDURE — 1090F PRES/ABSN URINE INCON ASSESS: CPT | Performed by: INTERNAL MEDICINE

## 2021-04-22 PROCEDURE — 3017F COLORECTAL CA SCREEN DOC REV: CPT | Performed by: INTERNAL MEDICINE

## 2021-04-22 PROCEDURE — G8427 DOCREV CUR MEDS BY ELIG CLIN: HCPCS | Performed by: INTERNAL MEDICINE

## 2021-04-22 PROCEDURE — 1036F TOBACCO NON-USER: CPT | Performed by: INTERNAL MEDICINE

## 2021-04-22 PROCEDURE — 3023F SPIROM DOC REV: CPT | Performed by: INTERNAL MEDICINE

## 2021-04-22 PROCEDURE — 4040F PNEUMOC VAC/ADMIN/RCVD: CPT | Performed by: INTERNAL MEDICINE

## 2021-04-22 RX ORDER — CEFUROXIME AXETIL 250 MG/1
250 TABLET ORAL 2 TIMES DAILY
Qty: 20 TABLET | Refills: 0 | Status: SHIPPED | OUTPATIENT
Start: 2021-04-22 | End: 2021-05-02

## 2021-04-22 RX ORDER — PREDNISONE 20 MG/1
20 TABLET ORAL DAILY
COMMUNITY
Start: 2021-03-18 | End: 2021-05-20 | Stop reason: SDUPTHER

## 2021-04-22 RX ORDER — BUSPIRONE HYDROCHLORIDE 10 MG/1
10 TABLET ORAL 3 TIMES DAILY PRN
Qty: 90 TABLET | Refills: 1 | Status: SHIPPED | OUTPATIENT
Start: 2021-04-22 | End: 2021-09-20

## 2021-04-22 SDOH — ECONOMIC STABILITY: INCOME INSECURITY: HOW HARD IS IT FOR YOU TO PAY FOR THE VERY BASICS LIKE FOOD, HOUSING, MEDICAL CARE, AND HEATING?: NOT HARD AT ALL

## 2021-04-22 SDOH — ECONOMIC STABILITY: TRANSPORTATION INSECURITY
IN THE PAST 12 MONTHS, HAS THE LACK OF TRANSPORTATION KEPT YOU FROM MEDICAL APPOINTMENTS OR FROM GETTING MEDICATIONS?: NO

## 2021-04-22 SDOH — ECONOMIC STABILITY: TRANSPORTATION INSECURITY
IN THE PAST 12 MONTHS, HAS LACK OF TRANSPORTATION KEPT YOU FROM MEETINGS, WORK, OR FROM GETTING THINGS NEEDED FOR DAILY LIVING?: NO

## 2021-04-22 SDOH — ECONOMIC STABILITY: FOOD INSECURITY: WITHIN THE PAST 12 MONTHS, YOU WORRIED THAT YOUR FOOD WOULD RUN OUT BEFORE YOU GOT MONEY TO BUY MORE.: NEVER TRUE

## 2021-04-22 ASSESSMENT — PATIENT HEALTH QUESTIONNAIRE - PHQ9
SUM OF ALL RESPONSES TO PHQ QUESTIONS 1-9: 1
SUM OF ALL RESPONSES TO PHQ QUESTIONS 1-9: 1
2. FEELING DOWN, DEPRESSED OR HOPELESS: 1
SUM OF ALL RESPONSES TO PHQ9 QUESTIONS 1 & 2: 1

## 2021-04-22 NOTE — TELEPHONE ENCOUNTER
Pt was seen today and thought that meds were going to be prescribed for her grief. Pt's pharmacy only has the antibiotic. Pt wants to know if the other med can be sent to [facility] in [place]? Pl advise.

## 2021-04-23 LAB
ESTIMATED AVERAGE GLUCOSE: 125.5 MG/DL
HBA1C MFR BLD: 6 %

## 2021-04-24 DIAGNOSIS — E03.9 ACQUIRED HYPOTHYROIDISM: ICD-10-CM

## 2021-04-24 DIAGNOSIS — F41.9 ANXIETY AND DEPRESSION: ICD-10-CM

## 2021-04-24 DIAGNOSIS — F32.A ANXIETY AND DEPRESSION: ICD-10-CM

## 2021-04-26 RX ORDER — LEVOTHYROXINE SODIUM 88 UG/1
TABLET ORAL
Qty: 90 TABLET | Refills: 1 | Status: SHIPPED | OUTPATIENT
Start: 2021-04-26 | End: 2021-09-11

## 2021-04-26 RX ORDER — CITALOPRAM 20 MG/1
TABLET ORAL
Qty: 90 TABLET | Refills: 1 | Status: SHIPPED | OUTPATIENT
Start: 2021-04-26 | End: 2021-09-11

## 2021-04-30 ASSESSMENT — ENCOUNTER SYMPTOMS
BLOOD IN STOOL: 0
NAUSEA: 0
VOMITING: 0
SINUS PAIN: 1
COUGH: 1
SINUS PRESSURE: 1
ABDOMINAL PAIN: 0
RHINORRHEA: 0
DIARRHEA: 0
SHORTNESS OF BREATH: 1
WHEEZING: 1
CONSTIPATION: 0
SORE THROAT: 0

## 2021-05-07 ENCOUNTER — TELEPHONE (OUTPATIENT)
Dept: FAMILY MEDICINE CLINIC | Age: 71
End: 2021-05-07

## 2021-05-07 NOTE — TELEPHONE ENCOUNTER
Dr Vik Gutierrez called regarding Fatuma's eyes. They are worse and Dr Sofia Coppola up her dose of prednisone to 60 mg. She called to see if you could get her in to a rheumatologist locally, so she doesn't have to drive over there.     Dr Sofia Coppola would like Dr. Rusty Hernandez to call her either way to let her know     [phone]

## 2021-05-11 ENCOUNTER — TELEPHONE (OUTPATIENT)
Dept: FAMILY MEDICINE CLINIC | Age: 71
End: 2021-05-11

## 2021-05-11 RX ORDER — FLUCONAZOLE 100 MG/1
100 TABLET ORAL DAILY
Qty: 7 TABLET | Refills: 0 | Status: SHIPPED | OUTPATIENT
Start: 2021-05-11 | End: 2021-05-18

## 2021-05-14 ENCOUNTER — TELEPHONE (OUTPATIENT)
Dept: FAMILY MEDICINE CLINIC | Age: 71
End: 2021-05-14

## 2021-05-14 DIAGNOSIS — H15.009 SCLERITIS AND EPISCLERITIS, UNSPECIFIED LATERALITY: Primary | ICD-10-CM

## 2021-05-14 DIAGNOSIS — H15.109 SCLERITIS AND EPISCLERITIS, UNSPECIFIED LATERALITY: Primary | ICD-10-CM

## 2021-05-14 NOTE — TELEPHONE ENCOUNTER
I received a phone call from Dr. Zhang of [facility]. Patient has been having a lot of orbital edema and inflammation and is requiring high-dose steroids for this. She feels she needs a steroid reducing agent and would like her referred to a rheumatologist.  Please call Mrs. Spencer and see where she would like to go. I am assuming she wants to do the group in [facility] as it is probably closest to her and I am I will do a referral per their account is not right on we can do another 1.   Please give her the number to call to schedule

## 2021-05-17 NOTE — TELEPHONE ENCOUNTER
Called the patient she received a call from someone telling her they are not accepting new patient's. I called [facility] and they are not taking NP . Pt would like to see someone around her area.

## 2021-05-17 NOTE — TELEPHONE ENCOUNTER
Put in a referral for Dr. Guzman Gonzalez is supposed to be in [address]? If not there probably some more over at [facility].   Number is [phone]

## 2021-05-20 ENCOUNTER — OFFICE VISIT (OUTPATIENT)
Dept: FAMILY MEDICINE CLINIC | Age: 71
End: 2021-05-20
Payer: MEDICARE

## 2021-05-20 VITALS
SYSTOLIC BLOOD PRESSURE: 118 MMHG | TEMPERATURE: 98 F | DIASTOLIC BLOOD PRESSURE: 64 MMHG | HEIGHT: 61 IN | OXYGEN SATURATION: 97 % | HEART RATE: 87 BPM | BODY MASS INDEX: 37.31 KG/M2 | WEIGHT: 197.6 LBS

## 2021-05-20 DIAGNOSIS — F41.9 ANXIETY AND DEPRESSION: ICD-10-CM

## 2021-05-20 DIAGNOSIS — E03.9 ACQUIRED HYPOTHYROIDISM: ICD-10-CM

## 2021-05-20 DIAGNOSIS — H15.009 SCLERITIS AND EPISCLERITIS, UNSPECIFIED LATERALITY: ICD-10-CM

## 2021-05-20 DIAGNOSIS — J44.9 CHRONIC OBSTRUCTIVE PULMONARY DISEASE, UNSPECIFIED COPD TYPE (HCC): ICD-10-CM

## 2021-05-20 DIAGNOSIS — I47.1 SVT (SUPRAVENTRICULAR TACHYCARDIA) (HCC): ICD-10-CM

## 2021-05-20 DIAGNOSIS — Z01.818 PRE-OP EXAMINATION: Primary | ICD-10-CM

## 2021-05-20 DIAGNOSIS — H15.109 SCLERITIS AND EPISCLERITIS, UNSPECIFIED LATERALITY: ICD-10-CM

## 2021-05-20 DIAGNOSIS — F32.A ANXIETY AND DEPRESSION: ICD-10-CM

## 2021-05-20 PROCEDURE — 99212 OFFICE O/P EST SF 10 MIN: CPT | Performed by: INTERNAL MEDICINE

## 2021-05-20 PROCEDURE — G8926 SPIRO NO PERF OR DOC: HCPCS | Performed by: INTERNAL MEDICINE

## 2021-05-20 PROCEDURE — G8427 DOCREV CUR MEDS BY ELIG CLIN: HCPCS | Performed by: INTERNAL MEDICINE

## 2021-05-20 PROCEDURE — 3023F SPIROM DOC REV: CPT | Performed by: INTERNAL MEDICINE

## 2021-05-20 PROCEDURE — 1090F PRES/ABSN URINE INCON ASSESS: CPT | Performed by: INTERNAL MEDICINE

## 2021-05-20 PROCEDURE — G8417 CALC BMI ABV UP PARAM F/U: HCPCS | Performed by: INTERNAL MEDICINE

## 2021-05-20 RX ORDER — PREDNISONE 20 MG/1
60 TABLET ORAL DAILY
Qty: 90 TABLET | Refills: 0
Start: 2021-05-20 | End: 2021-06-19

## 2021-05-20 ASSESSMENT — ENCOUNTER SYMPTOMS
SHORTNESS OF BREATH: 1
NAUSEA: 0
CONSTIPATION: 0
APNEA: 0
VOMITING: 0
TROUBLE SWALLOWING: 0
COUGH: 1
ABDOMINAL PAIN: 0
DIARRHEA: 0
SINUS PRESSURE: 1
BLOOD IN STOOL: 0
EYE DISCHARGE: 1
EYE REDNESS: 1

## 2021-05-20 NOTE — PROGRESS NOTES
SUBJECTIVE:  Cherrie Miranda is a 78 y.o. female being evaluated for:    Chief Complaint   Patient presents with    Pre-op Exam      Having surgery on 05/26/2021 of right eye biopsy ,Dr Curt Townsend  at [facility],   Fax number [phone]       HPI   Swelling in right eye and upper lid since [place]  ON high dose steroids- 60 mg daily and when tries to cut down eye becomes more swollen and red      Allergies   Allergen Reactions    Atarax [Hydroxyzine] Hives    Codeine Other (See Comments)     Makes her feel hyper    Sulfa Antibiotics Nausea Only    Zithromax [Azithromycin]      Itching and burning     Current Outpatient Medications   Medication Sig Dispense Refill    predniSONE (DELTASONE) 20 MG tablet Take 3 tablets by mouth daily 90 tablet 0    citalopram (CELEXA) 20 MG tablet TAKE 1 TABLET EVERY DAY 90 tablet 1    levothyroxine (SYNTHROID) 88 MCG tablet TAKE 1 TABLET EVERY DAY 90 tablet 1    busPIRone (BUSPAR) 10 MG tablet Take 1 tablet by mouth 3 times daily as needed (anxiety/grief) 90 tablet 1    zolpidem (AMBIEN) 10 MG tablet Take 1 tablet by mouth nightly as needed for Sleep for up to 90 days. 90 tablet 0    aspirin EC 81 MG EC tablet Take 1 tablet by mouth daily 30 tablet 0    RESTASIS 0.05 % ophthalmic emulsion Place 1 drop into both eyes 2 times daily       meloxicam (MOBIC) 15 MG tablet       Multiple Vitamins-Minerals (CENTRUM ADULTS PO) Take by mouth daily      nitroGLYCERIN (NITROSTAT) 0.4 MG SL tablet Place 1 tablet under the tongue every 5 minutes as needed for Chest pain up to max of 3 total doses.  If no relief after 1 dose, call 911. 25 tablet 3    tiotropium (SPIRIVA RESPIMAT) 2.5 MCG/ACT AERS inhaler Inhale 2.5 mcg into the lungs daily       Pseudoephedrine HCl (SUDAFED PO) Take by mouth as needed       OXYGEN Inhale 2 L/m2/min into the lungs nightly As needed during the day      theophylline (THEODUR) 300 MG extended release tablet Take 300 mg by mouth 2 times daily      Roflumilast (DALIRESP) 500 MCG tablet Take 500 mcg by mouth daily 30 tablet 3    albuterol (PROVENTIL;VENTOLIN) 90 MCG/ACT inhaler Inhale 2 puffs into the lungs every 6 hours as needed.  ipratropium-albuterol (DUONEB) 0.5-2.5 (3) MG/3ML SOLN nebulizer solution Inhale 1 vial into the lungs every 4 hours.  Montelukast Sodium (SINGULAIR PO) Take 10 mg by mouth nightly       Fluticasone-Salmeterol (ADVAIR DISKUS IN) Inhale into the lungs 2 times daily       metoprolol tartrate (LOPRESSOR) 25 MG tablet TAKE 1 TABLET TWICE DAILY       No current facility-administered medications for this visit. Social History     Socioeconomic History    Marital status:      Spouse name: Not on file    Number of children: Not on file    Years of education: Not on file    Highest education level: Not on file   Occupational History    Not on file   Tobacco Use    Smoking status: Former Smoker     Packs/day: 1.50     Years: 40.00     Pack years: 60.00     Quit date: 2015     Years since quittin.3    Smokeless tobacco: Never Used   Vaping Use    Vaping Use: Never used   Substance and Sexual Activity    Alcohol use: No    Drug use: No    Sexual activity: Yes     Partners: Male   Other Topics Concern    Not on file   Social History Narrative    Not on file     Social Determinants of Health     Financial Resource Strain: Low Risk     Difficulty of Paying Living Expenses: Not hard at all   Food Insecurity: No Food Insecurity    Worried About [address] in the Last Year: Never true    [facility] in the Last Year: Never true   Transportation Needs: No Transportation Needs    Lack of Transportation (Medical): No    Lack of Transportation (Non-Medical):  No   Physical Activity:     Days of Exercise per Week:     Minutes of Exercise per Session:    Stress:     Feeling of Stress :    Social Connections:     Frequency of Communication with Friends and Family:     Frequency of Social Gatherings with Friends and Family:     Attends Denominational Services:     Active Member of Clubs or Organizations:     Attends Club or Organization Meetings:     Marital Status:    Intimate Partner Violence:     Fear of Current or Ex-Partner:     Emotionally Abused:     Physically Abused:     Sexually Abused:       Past Medical History:   Diagnosis Date    Adrenal adenoma 12/07    left    Allergic rhinitis     Cervical disc disorder with radiculopathy 1994    COPD (chronic obstructive pulmonary disease) ([address]     Deviated nasal septum 1989    s/p septoplasty    Diverticulosis     h/o abscess  s/p colon resection 11/07 Dr. 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    Endometrial polyps 2/06    s hyperplasia    Grave's disease     Graves disease 11/97    s/p SCHMITT now hypothroid    Headache(784.0)     Herniated discs 1997    l5-s1 with DDD    HIGH CHOLESTEROL 6/09    ldl    Hypothyroidism     OA (osteoarthritis of the spine)     anterior wedgiy    Obesity     Osteopenia 1/2004    spine    Pneumonia 2001    hospitalized    Postherpetic neuralgia 6/23/2014    Scleritis and episcleritis of right eye     Tear meniscus knee     right    Tobacco abuse     Ulcer gastric 1980    Urticaria      Past Surgical History:   Procedure Laterality Date    NASAL SEPTUM SURGERY      SUBTOTAL COLECTOMY      diverticular abscess     TUBAL LIGATION       Family History   Problem Relation Age of Onset    Alcohol Abuse Mother     Cirrhosis Mother     Coronary Art Dis Father         lung    No Known Problems Maternal Grandmother     No Known Problems Maternal Grandfather     No Known Problems Paternal Grandmother     No Known Problems Paternal Grandfather    Dad with hx of malignant hyperthemia  No family hx of bleeding or clotting disorders   Review of Systems   Constitutional: Positive for unexpected weight change (slowly  losing and trying ).  Negative for activity change and fever.        No problems with anesthesia in patient but father with MALIGNANT HYPERTHERMIA   No known exposure to contagious or infectious diseases including covid    HENT: Positive for congestion and sinus pressure. Negative for nosebleeds and trouble swallowing. Eyes: Positive for discharge, redness and visual disturbance. Respiratory: Positive for cough and shortness of breath. Negative for apnea. Significant hx of severe copd  Stable   Doing well      Cardiovascular: Negative for chest pain, palpitations and leg swelling. No  Hx  Of MI, valvular heart disease, arrhythmia, rheumatic fever, or chf    Gastrointestinal: Negative for abdominal pain, blood in stool, constipation, diarrhea, nausea and vomiting. No hepatitis or jaundice,  Remote ulcer    Endocrine: Negative for cold intolerance and polydipsia. Pre diabetes with HGA1C of 6 recently and hypothyroid on replacement    Genitourinary: Negative for dysuria, hematuria and vaginal bleeding. No hx of kidney or bladder disease    Musculoskeletal: Positive for arthralgias (right knee ). Skin: Negative for rash and wound. No hx of mrsa or bedbugs    Neurological: Negative for dizziness, tremors, seizures, syncope, speech difficulty, weakness, light-headedness, numbness and headaches. No hx of stroke or tia    Hematological: Bruises/bleeds easily: bruises on steroids  No bleeding with previous surgeries         No hx of blood clots    Psychiatric/Behavioral: Positive for dysphoric mood (grief ). OBJECTIVE:  /64 (Site: Left Upper Arm, Position: Sitting, Cuff Size: Medium Adult)   Pulse 87   Temp 98 °F (36.7 °C) (Temporal)   Ht 5' 1\" (1.549 m)   Wt 197 lb 9.6 oz (89.6 kg)   LMP  (LMP Unknown)   SpO2 97%   BMI 37.34 kg/m²      Body mass index is 37.34 kg/m². Physical Exam  Constitutional:       General: She is not in acute distress. Appearance: Normal appearance. She is obese.    HENT: Head: Normocephalic and atraumatic. Comments: Both tympanic membranes appear retracted     Right Ear: Tympanic membrane and ear canal normal.      Left Ear: Tympanic membrane and ear canal normal.      Nose: Nose normal.      Mouth/Throat:      Pharynx: Oropharynx is clear. Eyes:      Extraocular Movements: Extraocular movements intact. Comments: Right eye and lid swollen and red and watering    Neck:      Vascular: No carotid bruit. Cardiovascular:      Rate and Rhythm: Normal rate and regular rhythm. Heart sounds: Normal heart sounds. No murmur heard. No friction rub. No gallop. Comments: Varicose veins  Pulmonary:      Effort: Pulmonary effort is normal.      Breath sounds: Normal breath sounds. Comments: Decreased breath sounds   Chest:      Chest wall: No tenderness. Abdominal:      General: There is no distension. Palpations: Abdomen is soft. Tenderness: There is no abdominal tenderness. Comments: No hepatosplenomegaly   Musculoskeletal:         General: No swelling. Cervical back: Neck supple. No tenderness. No muscular tenderness. Lymphadenopathy:      Cervical: No cervical adenopathy. Skin:     General: Skin is warm and dry. Neurological:      General: No focal deficit present. Mental Status: She is alert. Gait: Gait normal.   Psychiatric:         Mood and Affect: Mood normal.         Behavior: Behavior normal.         Thought Content: Thought content normal.         ASSESSMENT/PLAN:    Lilibeth Alexis was seen today for pre-op exam.    Diagnoses and all orders for this visit:    Pre-op examination  Comments:  cleared for surgery     Scleritis and episcleritis, unspecified laterality  Comments:  needing high dose steroids  Referral done for rheumatology   -     predniSONE (DELTASONE) 20 MG tablet;  Take 3 tablets by mouth dailyrheum  Bx planned     Chronic obstructive pulmonary disease, unspecified COPD type ([address]  Comments:  stable on meds and seeing Dr Tam Klein     Acquired hypothyroidism  Comments:  subacute hypothyroid with minimal tsh elevation without symptoms     Anxiety and depression    SVT (supraventricular tachycardia) (HCC)  Comments:  on metoprolol       Orders Placed This Encounter   Medications    predniSONE (DELTASONE) 20 MG tablet     Sig: Take 3 tablets by mouth daily     Dispense:  90 tablet     Refill:  0        Return if symptoms worsen or fail to improve. There are no Patient Instructions on file for this visit.

## 2021-06-02 ENCOUNTER — CLINICAL DOCUMENTATION (OUTPATIENT)
Dept: OTHER | Age: 71
End: 2021-06-02

## 2021-06-02 ENCOUNTER — TELEPHONE (OUTPATIENT)
Dept: FAMILY MEDICINE CLINIC | Age: 71
End: 2021-06-02

## 2021-06-02 DIAGNOSIS — J44.1 CHRONIC OBSTRUCTIVE PULMONARY DISEASE WITH ACUTE EXACERBATION (HCC): Primary | ICD-10-CM

## 2021-06-02 RX ORDER — LEVOFLOXACIN 500 MG/1
500 TABLET, FILM COATED ORAL DAILY
Qty: 10 TABLET | Refills: 0 | Status: SHIPPED | OUTPATIENT
Start: 2021-06-02 | End: 2021-06-12

## 2021-06-02 NOTE — TELEPHONE ENCOUNTER
Uri   Cough - dry   Nasal congestion - yes clear   Pressure in eyes and sinus   Headache - everyday   Ear pain - NO   Sore throat - yes from drainage   Temp - no   Sob  Yes   Wheezing yes   Cough - yes   Chest congestion - yes   Sx  - about 3 days now     Using her nebulizer 3 times a day and its not helping   robitussin and night quill - not helping     [facility] in [place]     Pt would like to know if she could be prescribed an abx and a cough syrup?     Pl advise [phone] (home)

## 2021-06-02 NOTE — TELEPHONE ENCOUNTER
Patient is still on the steroids for her eyes, pt doesn't feel sx's are improving much, cough is tight not able to get up anything. Using her nebulizer 3 times a day and its not helping,   robitussin and night quill - not helping,  pt asking for a cough syrup.

## 2021-06-02 NOTE — TELEPHONE ENCOUNTER
I will call in another antibiotic namely Levaquin and see if she is improving. Question if she is still on the steroids for her eyes.   Persisting should be seen

## 2021-06-07 DIAGNOSIS — J44.1 CHRONIC OBSTRUCTIVE PULMONARY DISEASE WITH ACUTE EXACERBATION (HCC): ICD-10-CM

## 2021-06-07 NOTE — TELEPHONE ENCOUNTER
I sent Oneil into [facility] for you. Await CT. If worsening problems to call.   I did send in your zolpidem to your mail order
Patient calling calling her ears are still hurting and she wanted to let yo know she had another dizzy spell last Wednesday 8/5. She is scheduled for the CT this Saturday. She want's to know if you wanted to call anything else in for her? If so [address] Patient also asking for her Ambien 10 mg to be called into [facility] mail order.         Please advise, [phone]    Done
No/Not applicable

## 2021-07-01 ENCOUNTER — TELEPHONE (OUTPATIENT)
Dept: FAMILY MEDICINE CLINIC | Age: 71
End: 2021-07-01

## 2021-07-01 DIAGNOSIS — F51.01 PRIMARY INSOMNIA: ICD-10-CM

## 2021-07-01 RX ORDER — ZOLPIDEM TARTRATE 10 MG/1
10 TABLET ORAL NIGHTLY PRN
Qty: 90 TABLET | Refills: 0 | Status: SHIPPED | OUTPATIENT
Start: 2021-07-01 | End: 2021-09-13 | Stop reason: SDUPTHER

## 2021-07-01 NOTE — TELEPHONE ENCOUNTER
[facility] is requesting for a refill on   zolpidem (AMBIEN) 10 MG tablet     [facility], [address]

## 2021-08-31 DIAGNOSIS — I51.7 ATRIAL HYPERTROPHY, SEPTAL: Primary | ICD-10-CM

## 2021-08-31 DIAGNOSIS — R93.89 ABNORMAL CT OF THE CHEST: ICD-10-CM

## 2021-09-01 PROBLEM — I47.10 SVT (SUPRAVENTRICULAR TACHYCARDIA): Status: ACTIVE | Noted: 2021-09-01

## 2021-09-01 PROBLEM — I49.1 PAC (PREMATURE ATRIAL CONTRACTION): Status: ACTIVE | Noted: 2021-09-01

## 2021-09-01 PROBLEM — R93.89 ABNORMAL CT SCAN, CHEST: Status: ACTIVE | Noted: 2021-09-01

## 2021-09-01 PROBLEM — I47.1 SVT (SUPRAVENTRICULAR TACHYCARDIA) (HCC): Status: ACTIVE | Noted: 2021-09-01

## 2021-09-01 NOTE — PROGRESS NOTES
[facility]      Cardiology Consult    Karthik Spencer  1950    2021    Referring Physician: Dr. Stefano Tran  Reason for Referral: Abnormal CT scan    CC: \"CT abnormal  \"      HPI:  The patient is 78 y.o. female with a past medical history significant for PAC's, SVT, adrenal adenoma, COPD, Graves dz s/p SCHMITT now hypothyroid, ulcer, anxiety, depression and prior tobacco use. She presents today for evaluation of abnormal CT lung that showed lipomatous hypertrophy of atrial septum. She states that the CT scan was done yearly for evaluation of her COPD. She reports feet swelling at night. She states that since her  , she has a lot of anxiety and can feel her heart racing at night. She denies associated symptoms. Her PCP prescribed metoprolol which improved her palpitations. She denies worsening chronic dyspnea. The patient denies exertional chest pain, dizziness, syncope, PND, orthopnea and fever. She has been on chronic steroid therapy since 2021 and reports associated bruising.        Past Medical History:   Diagnosis Date    Adrenal adenoma     left    Allergic rhinitis     Asthma     Cervical disc disorder with radiculopathy     COPD (chronic obstructive pulmonary disease) ([address]     Deviated nasal septum     s/p septoplasty    Diverticulosis     h/o abscess  s/p colon resection  Dr. 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    Endometrial polyps     s hyperplasia    Grave's disease     Graves disease     s/p SCHMITT now hypothroid    Headache(784.0)     Herniated discs     l5-s1 with DDD    HIGH CHOLESTEROL     ldl    Hypertension     Hypothyroidism     OA (osteoarthritis of the spine)     anterior wedgiy    Obesity     Osteopenia 2004    spine    Pneumonia     hospitalized    Postherpetic neuralgia 2014    Scleritis and episcleritis of right eye     Tear meniscus knee     right    Tobacco abuse     Ulcer gastric 1980    Urticaria      Past Surgical History:   Procedure Laterality Date    NASAL SEPTUM SURGERY      SUBTOTAL COLECTOMY      diverticular abscess     TUBAL LIGATION       Family History   Problem Relation Age of Onset    Alcohol Abuse Mother     Cirrhosis Mother     Coronary Art Dis Father         lung    No Known Problems Maternal Grandmother     No Known Problems Maternal Grandfather     No Known Problems Paternal Grandmother     No Known Problems Paternal Grandfather      Social History     Tobacco Use    Smoking status: Former Smoker     Packs/day: 1.50     Years: 40.00     Pack years: 60.00     Quit date: 2015     Years since quittin.7    Smokeless tobacco: Never Used   Vaping Use    Vaping Use: Never used   Substance Use Topics    Alcohol use: No    Drug use: No       Allergies   Allergen Reactions    Atarax [Hydroxyzine] Hives    Codeine Other (See Comments)     Makes her feel hyper    Sulfa Antibiotics Nausea Only    Zithromax [Azithromycin]      Itching and burning     Current Outpatient Medications   Medication Sig Dispense Refill    zolpidem (AMBIEN) 10 MG tablet Take 1 tablet by mouth nightly as needed for Sleep for up to 90 days. 90 tablet 0    citalopram (CELEXA) 20 MG tablet TAKE 1 TABLET EVERY DAY 90 tablet 1    levothyroxine (SYNTHROID) 88 MCG tablet TAKE 1 TABLET EVERY DAY 90 tablet 1    aspirin EC 81 MG EC tablet Take 1 tablet by mouth daily 30 tablet 0    metoprolol tartrate (LOPRESSOR) 25 MG tablet 25 mg 2 times daily       RESTASIS 0.05 % ophthalmic emulsion Place 1 drop into both eyes 2 times daily       meloxicam (MOBIC) 15 MG tablet       Multiple Vitamins-Minerals (CENTRUM ADULTS PO) Take by mouth daily      nitroGLYCERIN (NITROSTAT) 0.4 MG SL tablet Place 1 tablet under the tongue every 5 minutes as needed for Chest pain up to max of 3 total doses.  If no relief after 1 dose, call 911. 25 tablet 3    tiotropium (SPIRIVA RESPIMAT) 2.5 MCG/ACT AERS inhaler Inhale 2.5 mcg into the lungs daily       Pseudoephedrine HCl (SUDAFED PO) Take by mouth as needed       theophylline (THEODUR) 300 MG extended release tablet Take 300 mg by mouth 2 times daily      Roflumilast (DALIRESP) 500 MCG tablet Take 500 mcg by mouth daily 30 tablet 3    albuterol (PROVENTIL;VENTOLIN) 90 MCG/ACT inhaler Inhale 2 puffs into the lungs every 6 hours as needed.  ipratropium-albuterol (DUONEB) 0.5-2.5 (3) MG/3ML SOLN nebulizer solution Inhale 1 vial into the lungs every 4 hours.  Montelukast Sodium (SINGULAIR PO) Take 10 mg by mouth nightly       Fluticasone-Salmeterol (ADVAIR DISKUS IN) Inhale into the lungs 2 times daily        No current facility-administered medications for this visit. Review of Systems:  · Constitutional: no unanticipated weight loss. There's been no change in energy level, sleep pattern, or activity level. No fevers, chills. · Eyes: No visual changes or diplopia. No scleral icterus. · ENT: No Headaches, hearing loss or vertigo. No mouth sores or sore throat. · Cardiovascular: as reviewed in HPI  · Respiratory: No cough or wheezing, no sputum production. No hematemesis. · Gastrointestinal: No abdominal pain, appetite loss, blood in stools. No change in bowel or bladder habits. · Genitourinary: No dysuria, trouble voiding, or hematuria. · Musculoskeletal:  No gait disturbance, no joint complaints. · Integumentary: No rash or pruritis. · Neurological: No headache, diplopia, change in muscle strength, numbness or tingling. · Psychiatric: No anxiety or depression. · Endocrine: No temperature intolerance. No excessive thirst, fluid intake, or urination. No tremor. · Hematologic/Lymphatic: No abnormal bruising or bleeding, blood clots or swollen lymph nodes. · Allergic/Immunologic: No nasal congestion or hives.     Physical Exam:   /78 (Site: Left Upper Arm, Position: Sitting, Cuff Size: Medium Adult) Pulse 82   Ht 5' 1\" (1.549 m)   Wt 203 lb 3.2 oz (92.2 kg)   LMP  (LMP Unknown)   SpO2 97%   BMI 38.39 kg/m²   Wt Readings from Last 3 Encounters:   09/20/21 203 lb 3.2 oz (92.2 kg)   05/20/21 197 lb 9.6 oz (89.6 kg)   04/22/21 201 lb (91.2 kg)     Constitutional: She is oriented to person, place, and time. She appears well-developed and well-nourished. In no acute distress. Head: Normocephalic and atraumatic. Pupils equal and round. Neck: Neck supple. No JVP or carotid bruit appreciated. No mass and no thyromegaly present. No lymphadenopathy present. Cardiovascular: Normal rate. Normal heart sounds. Exam reveals no gallop and no friction rub. No murmur heard. Pulmonary/Chest: Effort normal and breath sounds normal. No respiratory distress. She has no wheezes, rhonchi or rales. Abdominal: Soft, non-tender. Bowel sounds are normal. She exhibits no organomegaly, mass or bruit. Extremities: N1-2+ BLE edema, cyanosis, or clubbing. Pulses are 2+ radial/dorsalis pedis/posterior tibial/carotid bilaterally. Neurological: No gross cranial nerve deficit. Coordination normal.   Skin: Skin is warm and dry. There is no rash or diaphoresis. Psychiatric: She has a normal mood and affect. Her speech is normal and behavior is normal.     Lab Review:   FLP:    Lab Results   Component Value Date    TRIG 159 04/22/2021    HDL 72 04/22/2021    HDL 62 10/04/2011    LDLCALC 122 04/22/2021    LABVLDL 32 04/22/2021     BUN/Creatinine:    Lab Results   Component Value Date    BUN 23 08/26/2021    CREATININE 0.85 08/26/2021   TSH 5.40 4/2021. EKG Interpretation:   EKG 2020 NSR  EKG 9/20/21 NSR, RBBB    Holter monitor 2018 NSR/ST, frequent PAC's, rare PVC's, 2 pauses- c/o fluttering and SOB when in NSR. Racing reported while in [address] [place] [address]  Image Review:   Echo 2012 LVEF 70%. CT chest 8/2021  1. Lipomatous hypertrophy of the atrial septum. Cardiology consultation   recommended. 2. Stable small bilateral adrenal adenoma. 3. Minimal bibasilar linear scarring and subsegmental atelectasis as    described. PFT 2018 moderate obstructive defect. Assessment/Plan:   Lipomatous hypertrophy of the atrial septum per CT lung.   -Pt is here as a new Pt. During her  routine CT scan  -Asymptomatic except palpitation.   -Test results and treatment options discussed.   -Likely benign finding.   -Recommend echocardiogram to further evaluate as its more specific than CT scan. Hx palpitations/SVT/PAC's. -Improved on metoprolol per pt. - If her symptoms worsen, will consider 24/48 monitor    COPD/prior nicotine addiction. Ongoing tobacco avoidance discussed. LE swelling.   -Doubt related to CHF. Likely related to steroids and venous insufficiency. -Advised to limit salt intake and elevate legs as much as possible. -Recommend echocardiogram to evaluate exclude structural and/or functional abnormalities. F/u in office in prn after reviewing echo. Complexity of medical decision making- high      Thank you very much for allowing me to participate in the care of your patient. Please do not hesitate to contact me if you have any questions. Sincerely,  Nino Choudhary MD      [facility], [address] [place] [address]  Ph: [phone]  Fax: [phone]    This note was scribed in the presence of the physician by Savi Almanza RN.

## 2021-09-10 DIAGNOSIS — F41.9 ANXIETY AND DEPRESSION: ICD-10-CM

## 2021-09-10 DIAGNOSIS — F32.A ANXIETY AND DEPRESSION: ICD-10-CM

## 2021-09-10 DIAGNOSIS — E03.9 ACQUIRED HYPOTHYROIDISM: ICD-10-CM

## 2021-09-11 RX ORDER — LEVOTHYROXINE SODIUM 88 UG/1
TABLET ORAL
Qty: 90 TABLET | Refills: 1 | Status: SHIPPED | OUTPATIENT
Start: 2021-09-11 | End: 2022-03-02

## 2021-09-11 RX ORDER — CITALOPRAM 20 MG/1
TABLET ORAL
Qty: 90 TABLET | Refills: 1 | Status: SHIPPED | OUTPATIENT
Start: 2021-09-11 | End: 2021-10-13 | Stop reason: ALTCHOICE

## 2021-09-13 DIAGNOSIS — F51.01 PRIMARY INSOMNIA: ICD-10-CM

## 2021-09-13 RX ORDER — ZOLPIDEM TARTRATE 10 MG/1
10 TABLET ORAL NIGHTLY PRN
Qty: 90 TABLET | Refills: 0 | Status: SHIPPED | OUTPATIENT
Start: 2021-09-20 | End: 2021-12-08 | Stop reason: SDUPTHER

## 2021-09-20 ENCOUNTER — OFFICE VISIT (OUTPATIENT)
Dept: CARDIOLOGY CLINIC | Age: 71
End: 2021-09-20
Payer: MEDICARE

## 2021-09-20 VITALS
HEART RATE: 82 BPM | SYSTOLIC BLOOD PRESSURE: 120 MMHG | BODY MASS INDEX: 38.36 KG/M2 | DIASTOLIC BLOOD PRESSURE: 78 MMHG | OXYGEN SATURATION: 97 % | WEIGHT: 203.2 LBS | HEIGHT: 61 IN

## 2021-09-20 DIAGNOSIS — Q20.9: ICD-10-CM

## 2021-09-20 DIAGNOSIS — I47.1 SVT (SUPRAVENTRICULAR TACHYCARDIA) (HCC): ICD-10-CM

## 2021-09-20 DIAGNOSIS — M79.89 LEG SWELLING: ICD-10-CM

## 2021-09-20 DIAGNOSIS — I49.1 PAC (PREMATURE ATRIAL CONTRACTION): ICD-10-CM

## 2021-09-20 DIAGNOSIS — R93.89 ABNORMAL CT SCAN, CHEST: Primary | ICD-10-CM

## 2021-09-20 PROCEDURE — 4040F PNEUMOC VAC/ADMIN/RCVD: CPT | Performed by: INTERNAL MEDICINE

## 2021-09-20 PROCEDURE — 1090F PRES/ABSN URINE INCON ASSESS: CPT | Performed by: INTERNAL MEDICINE

## 2021-09-20 PROCEDURE — 1123F ACP DISCUSS/DSCN MKR DOCD: CPT | Performed by: INTERNAL MEDICINE

## 2021-09-20 PROCEDURE — 99204 OFFICE O/P NEW MOD 45 MIN: CPT | Performed by: INTERNAL MEDICINE

## 2021-09-20 PROCEDURE — G8399 PT W/DXA RESULTS DOCUMENT: HCPCS | Performed by: INTERNAL MEDICINE

## 2021-09-20 PROCEDURE — 1036F TOBACCO NON-USER: CPT | Performed by: INTERNAL MEDICINE

## 2021-09-20 PROCEDURE — G8427 DOCREV CUR MEDS BY ELIG CLIN: HCPCS | Performed by: INTERNAL MEDICINE

## 2021-09-20 PROCEDURE — 3017F COLORECTAL CA SCREEN DOC REV: CPT | Performed by: INTERNAL MEDICINE

## 2021-09-20 PROCEDURE — 93000 ELECTROCARDIOGRAM COMPLETE: CPT | Performed by: INTERNAL MEDICINE

## 2021-09-20 PROCEDURE — G8417 CALC BMI ABV UP PARAM F/U: HCPCS | Performed by: INTERNAL MEDICINE

## 2021-09-20 NOTE — PATIENT INSTRUCTIONS

## 2021-10-11 ENCOUNTER — TELEPHONE (OUTPATIENT)
Dept: FAMILY MEDICINE CLINIC | Age: 71
End: 2021-10-11

## 2021-10-11 NOTE — TELEPHONE ENCOUNTER
Patient is calling to make hospital follow up this week, nothing available. She is having a very hard time with anxiety after all that happened to her. She was dx with covid on 9/28 and is if she is out of quarantine? She has no symptoms now. She did have heart attack and has defibrillator/packemaker.         Please advise, [phone]

## 2021-10-13 ENCOUNTER — OFFICE VISIT (OUTPATIENT)
Dept: FAMILY MEDICINE CLINIC | Age: 71
End: 2021-10-13
Payer: MEDICARE

## 2021-10-13 ENCOUNTER — HOSPITAL ENCOUNTER (OUTPATIENT)
Dept: VASCULAR LAB | Age: 71
Discharge: HOME | End: 2021-10-13
Payer: MEDICARE

## 2021-10-13 ENCOUNTER — HOSPITAL ENCOUNTER (EMERGENCY)
Age: 71
Discharge: HOME | End: 2021-10-13
Attending: EMERGENCY MEDICINE
Payer: MEDICARE

## 2021-10-13 ENCOUNTER — TELEPHONE (OUTPATIENT)
Dept: FAMILY MEDICINE CLINIC | Age: 71
End: 2021-10-13

## 2021-10-13 VITALS
WEIGHT: 205 LBS | DIASTOLIC BLOOD PRESSURE: 78 MMHG | TEMPERATURE: 98.1 F | BODY MASS INDEX: 38.71 KG/M2 | HEART RATE: 78 BPM | HEIGHT: 61 IN | SYSTOLIC BLOOD PRESSURE: 128 MMHG | OXYGEN SATURATION: 98 %

## 2021-10-13 VITALS
BODY MASS INDEX: 42.87 KG/M2 | TEMPERATURE: 98.4 F | HEART RATE: 97 BPM | SYSTOLIC BLOOD PRESSURE: 104 MMHG | RESPIRATION RATE: 18 BRPM | WEIGHT: 227.07 LBS | HEIGHT: 61 IN | OXYGEN SATURATION: 95 % | DIASTOLIC BLOOD PRESSURE: 70 MMHG

## 2021-10-13 DIAGNOSIS — R60.0 BILATERAL LEG EDEMA: Primary | ICD-10-CM

## 2021-10-13 DIAGNOSIS — E03.9 ACQUIRED HYPOTHYROIDISM: ICD-10-CM

## 2021-10-13 DIAGNOSIS — H15.001 SCLERITIS OF RIGHT EYE: ICD-10-CM

## 2021-10-13 DIAGNOSIS — J44.1 CHRONIC OBSTRUCTIVE PULMONARY DISEASE WITH ACUTE EXACERBATION (HCC): ICD-10-CM

## 2021-10-13 DIAGNOSIS — R60.0 BILATERAL LEG EDEMA: ICD-10-CM

## 2021-10-13 DIAGNOSIS — I47.20 VENTRICULAR TACHYCARDIA: Primary | ICD-10-CM

## 2021-10-13 DIAGNOSIS — U07.1 COVID-19 VIRUS INFECTION: ICD-10-CM

## 2021-10-13 DIAGNOSIS — D86.9 SARCOIDOSIS: ICD-10-CM

## 2021-10-13 PROCEDURE — 99215 OFFICE O/P EST HI 40 MIN: CPT | Performed by: INTERNAL MEDICINE

## 2021-10-13 PROCEDURE — 1111F DSCHRG MED/CURRENT MED MERGE: CPT | Performed by: INTERNAL MEDICINE

## 2021-10-13 PROCEDURE — 99283 EMERGENCY DEPT VISIT LOW MDM: CPT

## 2021-10-13 PROCEDURE — 90694 VACC AIIV4 NO PRSRV 0.5ML IM: CPT | Performed by: INTERNAL MEDICINE

## 2021-10-13 PROCEDURE — 93970 EXTREMITY STUDY: CPT

## 2021-10-13 PROCEDURE — G0008 ADMIN INFLUENZA VIRUS VAC: HCPCS | Performed by: INTERNAL MEDICINE

## 2021-10-13 RX ORDER — FOLIC ACID 1 MG/1
1 TABLET ORAL DAILY
COMMUNITY

## 2021-10-13 RX ORDER — DULOXETIN HYDROCHLORIDE 60 MG/1
60 CAPSULE, DELAYED RELEASE ORAL DAILY
Qty: 30 CAPSULE | Refills: 5 | Status: SHIPPED | OUTPATIENT
Start: 2021-10-13 | End: 2022-02-01

## 2021-10-13 RX ORDER — POLYMYXIN B SULFATE AND TRIMETHOPRIM 1; 10000 MG/ML; [USP'U]/ML
1 SOLUTION OPHTHALMIC EVERY 4 HOURS
COMMUNITY
End: 2021-11-27

## 2021-10-13 RX ORDER — PREDNISONE 10 MG/1
20 TABLET ORAL DAILY
COMMUNITY
End: 2021-12-17 | Stop reason: DRUGHIGH

## 2021-10-13 RX ORDER — FUROSEMIDE 20 MG/1
20 TABLET ORAL DAILY
COMMUNITY
End: 2022-01-04 | Stop reason: SDUPTHER

## 2021-10-13 RX ORDER — DILTIAZEM HYDROCHLORIDE 240 MG/1
360 CAPSULE, EXTENDED RELEASE ORAL DAILY
COMMUNITY
End: 2021-11-27

## 2021-10-13 NOTE — TELEPHONE ENCOUNTER
Got the call from [facility] regarding the VL Extremity Venous Bilateral test and she said its negative.

## 2021-10-13 NOTE — ED PROVIDER NOTES
[facility]  eMERGENCY dEPARTMENT eNCOUnter      Pt Name: Moreno Spencer  MRN: [id number]  [place] 1950  Date of evaluation: 10/13/2021  Provider: Claudia Hartman MD    [address] [place] [address]       Chief Complaint   Patient presents with    Leg Swelling     patient concerned with fluid drainage from bilateral legs         HISTORY OF PRESENT ILLNESS  (Location/Symptom, Timing/Onset, Context/Setting, Quality, Duration, Modifying Factors, Severity.)   Braxton Corral is a 78 y.o. female who presents to the emergency department complaining of fluid draining from her legs. This patient's had a recent Covid infection. She subsequently states she had a heart attack and had a cardiac arrest and had to be defibrillated. She states she subsequently had a defibrillator placed. This was done at [facility]. She then developed some leg edema. Her pulmonologist put her on some Lasix today. She is here tonight because she has 2 small wounds on her leg and she had some clear fluid draining out of the wounds and this concerned her. Nursing Notes were reviewed and I agree. REVIEW OF SYSTEMS    (2-9 systems for level 4, 10 or more for level 5)     ENT: Negative. Cardiovascular: No chest pain. Pulmonary: No shortness of breath. GI: No abdominal pain. Musculoskeletal: Bilateral leg swelling. Skin: Scattered bruising. 2 wounds on her left leg that are weeping clear fluid. Except as noted above the remainder of the review of systems was reviewed and negative.        PAST MEDICAL HISTORY         Diagnosis Date    Adrenal adenoma 12/07    left    Allergic rhinitis     Asthma     Cervical disc disorder with radiculopathy 1994    COPD (chronic obstructive pulmonary disease) ([address]     Deviated nasal septum 1989    s/p septoplasty    Diverticulosis     h/o abscess  s/p colon resection 11/07 Dr. Heiid Sierra Endocervical polyp 2/2006    sguamam metoplasia    Endometrial polyps 2/06    s hyperplasia    Grave's disease     Graves disease 11/97    s/p SCHMITT now hypothroid    Headache(784.0)     Herniated discs 1997    l5-s1 with DDD    HIGH CHOLESTEROL 6/09    ldl    Hypertension     Hypothyroidism     OA (osteoarthritis of the spine)     anterior wedgiy    Obesity     Osteopenia 1/2004    spine    Pneumonia 2001    hospitalized    Postherpetic neuralgia 6/23/2014    Scleritis and episcleritis of right eye     Tear meniscus knee     right    Tobacco abuse     Ulcer gastric 1980    Urticaria        SURGICAL HISTORY           Procedure Laterality Date    NASAL SEPTUM SURGERY      SUBTOTAL COLECTOMY      diverticular abscess     TUBAL LIGATION         CURRENT MEDICATIONS       Previous Medications    ALBUTEROL (PROVENTIL;VENTOLIN) 90 MCG/ACT INHALER    Inhale 2 puffs into the lungs every 6 hours as needed. ASPIRIN EC 81 MG EC TABLET    Take 1 tablet by mouth daily    CITALOPRAM (CELEXA) 20 MG TABLET    TAKE 1 TABLET EVERY DAY    FLUTICASONE-SALMETEROL (ADVAIR DISKUS IN)    Inhale into the lungs 2 times daily     IPRATROPIUM-ALBUTEROL (DUONEB) 0.5-2.5 (3) MG/3ML SOLN NEBULIZER SOLUTION    Inhale 1 vial into the lungs every 4 hours. LEVOTHYROXINE (SYNTHROID) 88 MCG TABLET    TAKE 1 TABLET EVERY DAY    MELOXICAM (MOBIC) 15 MG TABLET        METOPROLOL TARTRATE (LOPRESSOR) 25 MG TABLET    25 mg 2 times daily     MONTELUKAST SODIUM (SINGULAIR PO)    Take 10 mg by mouth nightly     MULTIPLE VITAMINS-MINERALS (CENTRUM ADULTS PO)    Take by mouth daily    NITROGLYCERIN (NITROSTAT) 0.4 MG SL TABLET    Place 1 tablet under the tongue every 5 minutes as needed for Chest pain up to max of 3 total doses. If no relief after 1 dose, call 911.     PSEUDOEPHEDRINE HCL (SUDAFED PO)    Take by mouth as needed     RESTASIS 0.05 % OPHTHALMIC EMULSION    Place 1 drop into both eyes 2 times daily     ROFLUMILAST (DALIRESP) 500 MCG TABLET    Take 500 mcg by mouth daily    THEOPHYLLINE diaphoresis. Her skin is very thin. She has scattered bruising on her arms and legs. She has 1 linear healing scratch on her left lower anterior tibia and there is a little bit of serous fluid draining out of it. She has a small 1 cm superficial ulceration the back of her left calf and there is a small amount of serous fluid draining out of it. Neither wound appears infected. DIAGNOSTIC RESULTS     RADIOLOGY:   Non-plain film images such as CT, Ultrasound and MRI are read by the radiologist. Plain radiographic images are visualized and preliminarily interpreted by Gary Menezes MD with the below findings:      Interpretation per the Radiologist below, if available at the time of this note:    VL Extremity Venous Bilateral    (Results Pending)       LABS:  Labs Reviewed - No data to display    All other labs were within normal range or not returned as of this dictation. EMERGENCY DEPARTMENT COURSE and DIFFERENTIAL DIAGNOSIS/MDM:   Vitals: There were no vitals filed for this visit. This patient is here because of the clear fluid draining out of the 2 wounds on her legs. I think this is serous fluid. The wounds are certainly not infected. Her doctor just started on Lasix today. She has appointment with her PCP tomorrow. As noted above she had a recent Covid infection. She had what she describes as a mild heart attack and had a cardiac arrest and had to have a pacemaker placed. She is concerned about blood clots. I told her I thought it was unlikely that she had blood clots in both legs. She not really having any leg pain. I did tell her in light of her recent Covid infection and the hypercoagulable state associated with Covid is not unreasonable to go ahead and get a Doppler study. I gave her an outpatient order for bilateral venous Dopplers of her legs. She has appoint with her PCP tomorrow. Were going to wrap her legs for she is going to continue to elevate.   I recommended compression stockings. She is going to continue the Lasix, she took her first dose today. PROCEDURES:  None    FINAL IMPRESSION      1.  Bilateral leg edema          DISPOSITION/PLAN   DISPOSITION Decision To Discharge 10/13/2021 01:30:11 AM      PATIENT REFERRED TO:  Raheem Guzmán [address] [place] [address]  [phone]    In 1 day  as scheduled      DISCHARGE MEDICATIONS:  New Prescriptions    No medications on file       (Please note that portions of this note were completed with a voice recognition program.  Efforts were made to edit the dictations but occasionally words are mis-transcribed.)    Celia Rubin MD  Attending Emergency Physician        Tee Meneses MD  10/13/21 0511

## 2021-10-13 NOTE — PROGRESS NOTES
SUBJECTIVE:  Chastity Rodriguez is a 73 y.o. female being evaluated for:    Chief Complaint   Patient presents with    Follow-Up from Hospital      Pt is here for hospital followup today. HPI   Patient got Moderna vaccine in May and June  2 weeks after increasing sob and low grade temperature and bad cough and achy all over  Went to urgent care in [place] and had Positive covid test  Xr did not show pneumonia  Rapid strep negative and fast covid positive  No treatment given and went to [facility] and given antivody infusion   The following day  Exhausted and tired and went to lay down and heart coming out of her chest and could not get her breath WOrse and worse  Pulse was near 275  and called 911.   [place] as if would pass out  Pain in shoulder and jaws and take to [facility] Defibrillated by the life squad  And heart rate down in 1/2  Ventricular tachycardia /fibrillation Hospitalized at [facility] Cath lab with no blockages and \heart looks heart good  Small mi by enzymes but was defibrillated  AICD/ pacer placed   On chronic prednisone due to her scleritis   Echo with normal EF normal electrolytes   Still very short of breath     Cellulitis in hospital on kelflex     Sarcoidosis diagnosed by pulmonary at [facility] Severe copd and uses nebulizer of duoneb,and albuterol MDI and advair and spiriva  On angelo and chronic steroids      Allergies   Allergen Reactions    Atarax [Hydroxyzine] Hives    Codeine Other (See Comments)     Makes her feel hyper    Sulfa Antibiotics Nausea Only    Zithromax [Azithromycin]      Itching and burning     Current Outpatient Medications   Medication Sig Dispense Refill    methotrexate (RHEUMATREX) 2.5 MG chemo tablet Take by mouth once a week      predniSONE (DELTASONE) 10 MG tablet Take 10 mg by mouth daily      furosemide (LASIX) 20 MG tablet Take 20 mg by mouth 2 times daily      trimethoprim-polymyxin b (POLYTRIM) 71672-7.1 UNIT/ML-% ophthalmic solution 1 drop every 4 hours      Spouse name: Not on file    Number of children: Not on file    Years of education: Not on file    Highest education level: Not on file   Occupational History    Not on file   Tobacco Use    Smoking status: Former Smoker     Packs/day: 1.50     Years: 40.00     Pack years: 60.00     Quit date: 2015     Years since quittin.8    Smokeless tobacco: Never Used   Vaping Use    Vaping Use: Never used   Substance and Sexual Activity    Alcohol use: No    Drug use: No    Sexual activity: Yes     Partners: Male   Other Topics Concern    Not on file   Social History Narrative    Not on file     Social Determinants of Health     Financial Resource Strain: Low Risk     Difficulty of Paying Living Expenses: Not hard at all   Food Insecurity: No Food Insecurity    Worried About [employer] in the Last Year: Never true    [employer] in the Last Year: Never true   Transportation Needs: No Transportation Needs    Lack of Transportation (Medical): No    Lack of Transportation (Non-Medical):  No   Physical Activity:     Days of Exercise per Week:     Minutes of Exercise per Session:    Stress:     Feeling of Stress :    Social Connections:     Frequency of Communication with Friends and Family:     Frequency of Social Gatherings with Friends and Family:     Attends Mosque Services:     Active Member of Clubs or Organizations:     Attends Club or Organization Meetings:     Marital Status:    Intimate Partner Violence:     Fear of Current or Ex-Partner:     Emotionally Abused:     Physically Abused:     Sexually Abused:       Past Medical History:   Diagnosis Date    Adrenal adenoma     left    Allergic rhinitis     Asthma     Cervical disc disorder with radiculopathy     COPD (chronic obstructive pulmonary disease) ([facility]     Deviated nasal septum     s/p septoplasty    Diverticulosis     h/o abscess  s/p colon resection  Dr. Nichole Drake Endocervical polyp 2/2006    sguamam metoplasia    Endometrial polyps 2/06    s hyperplasia    Grave's disease     Graves disease 11/97    s/p SCHMITT now hypothroid    Headache(784.0)     Herniated discs 1997    l5-s1 with DDD    HIGH CHOLESTEROL 6/09    ldl    Hypertension     Hypothyroidism     OA (osteoarthritis of the spine)     anterior wedgiy    Obesity     Osteopenia 1/2004    spine    Pneumonia 2001    hospitalized    Postherpetic neuralgia 6/23/2014    Scleritis and episcleritis of right eye     Tear meniscus knee     right    Tobacco abuse     Ulcer gastric 1980    Urticaria      Past Surgical History:   Procedure Laterality Date    NASAL SEPTUM SURGERY      SUBTOTAL COLECTOMY      diverticular abscess     TUBAL LIGATION         Review of Systems   Constitutional: Positive for activity change, fatigue, fever and unexpected weight change (lost). HENT: Positive for sinus pressure. Negative for nosebleeds and trouble swallowing. Eyes: Positive for discharge, redness and visual disturbance. Respiratory: Positive for cough and shortness of breath. Cardiovascular: Positive for chest pain, palpitations and leg swelling. Gastrointestinal: Negative for abdominal pain, blood in stool, constipation, diarrhea, nausea and vomiting. Endocrine: Negative for cold intolerance and heat intolerance. Pre diabetes    Genitourinary: Negative for dysuria and frequency. Musculoskeletal: Positive for arthralgias (right knee ) and myalgias. Skin: Negative for rash and wound. Neurological: Positive for syncope. Negative for dizziness, tremors, seizures, speech difficulty, weakness, light-headedness, numbness and headaches. Hematological: Bruises/bleeds easily:     Psychiatric/Behavioral: Positive for dysphoric mood (grief ).        OBJECTIVE:  /78 (Site: Left Upper Arm, Position: Sitting, Cuff Size: Large Adult)   Pulse 78   Temp 98.1 °F (36.7 °C) (Temporal)   Ht 5' 1\" (1.549 m)   Wt 205 lb (93 kg)   LMP  (LMP Unknown)   SpO2 98%   BMI 38.73 kg/m²      Body mass index is 38.73 kg/m². Physical Exam  Constitutional:       General: She is not in acute distress. Appearance: Normal appearance. She is obese. HENT:      Head: Normocephalic and atraumatic. Comments: Both tympanic membranes appear retracted     Right Ear: Tympanic membrane and ear canal normal.      Left Ear: Tympanic membrane and ear canal normal.      Nose: Nose normal.      Mouth/Throat:      Pharynx: Oropharynx is clear. Eyes:      Extraocular Movements: Extraocular movements intact. Comments: Right eye and lid swollen and red and watering    Neck:      Vascular: No carotid bruit. Cardiovascular:      Rate and Rhythm: Normal rate and regular rhythm. Heart sounds: Normal heart sounds. No murmur heard. No friction rub. No gallop. Comments: Varicose veins  Pulmonary:      Effort: Pulmonary effort is normal.      Breath sounds: Normal breath sounds. Comments: Decreased breath sounds   Chest:      Chest wall: No tenderness. Abdominal:      General: There is no distension. Palpations: Abdomen is soft. Tenderness: There is no abdominal tenderness. Comments: No hepatosplenomegaly   Musculoskeletal:         General: Swelling present. Cervical back: Neck supple. No tenderness. No muscular tenderness. Lymphadenopathy:      Cervical: No cervical adenopathy. Skin:     General: Skin is warm and dry. Findings: Bruising present. Neurological:      General: No focal deficit present. Mental Status: She is alert. Gait: Gait normal.         ASSESSMENT/PLAN:    Fatuma was seen today for follow-up from hospital.    Diagnoses and all orders for this visit:    Ventricular tachycardia ([address] AICD placed  Heart labs okay following with [facility] cardiology   -     Basic Metabolic Panel;  Future  -     CBC Auto Differential; Future  -     OR DISCHARGE MEDS RECONCILED W/ CURRENT OUTPATIENT MED LIST    COVID-19 virus infection not sure how relates to problem   -     VL Extremity Venous Bilateral; Future    Chronic obstructive pulmonary disease with acute exacerbation (HCC) severe disease and follows with pulmonary. Inhalers and uses nebulizer treatments regularly    Sarcoidosis seeing pulmonary  Both Dr Galdino Leavitt and [facility] and to follow up     Bilateral leg edema  -     VL Extremity Venous Bilateral; Future    Scleritis of right eye on steroids follows with [facility]     Acquired hypothyroidism tsh normal at [address] [place] [address]     Depression major   -     DULoxetine (CYMBALTA) 60 MG extended release capsule; Take 1 capsule by mouth daily    vaccine  -     INFLUENZA, QUADV, ADJUVANTED, 65 YRS =, IM, PF, PREFILL SYR, 0.5ML (FLUAD)        Orders Placed This Encounter   Medications    DULoxetine (CYMBALTA) 60 MG extended release capsule     Sig: Take 1 capsule by mouth daily     Dispense:  30 capsule     Refill:  5        Return in 1 month (on 11/13/2021). There are no Patient Instructions on file for this visit.     Post-Discharge Transitional Care Management Services or Hospital Follow Up      Vicky Spencer   YOB: 1950    Date of Office Visit:  10/13/2021  Date of Hospital Admission: 9/30/2021  Date of Hospital Discharge: 10/06/2021    Care management risk score Rising risk (score 2-5) and Complex Care (Scores >=6): 2     Non face to face  following discharge, date last encounter closed (first attempt may have been   Call initiated 2 business days of discharge:no    Patient Active Problem List   Diagnosis    Obesity    Tobacco abuse    Allergic rhinitis    Graves disease    COPD (chronic obstructive pulmonary disease) ([facility]    Diverticulosis    Hypercholesteremia    Adrenal adenoma    Osteopenia    Intervertebral disc prolapse    OA (osteoarthritis of the spine)    Cervical disc disorder with radiculopathy    Essential hypertension    Abnormal CT scan, chest    PAC (premature atrial contraction)    SVT (supraventricular tachycardia) (HCC)       Allergies   Allergen Reactions    Atarax [Hydroxyzine] Hives    Codeine Other (See Comments)     Makes her feel hyper    Sulfa Antibiotics Nausea Only    Zithromax [Azithromycin]      Itching and burning       Medications listed as ordered at the time of discharge from hospital  yes    Medications marked \"taking\" at this time  Outpatient Medications Marked as Taking for the 10/13/21 encounter (Office Visit) with Tori Sheridan MD   Medication Sig Dispense Refill    methotrexate (RHEUMATREX) 2.5 MG chemo tablet Take by mouth once a week      predniSONE (DELTASONE) 10 MG tablet Take 10 mg by mouth daily      furosemide (LASIX) 20 MG tablet Take 20 mg by mouth 2 times daily      trimethoprim-polymyxin b (POLYTRIM) 85624-8.1 UNIT/ML-% ophthalmic solution 1 drop every 4 hours      dilTIAZem (DILACOR XR) 240 MG extended release capsule Take 360 mg by mouth daily      folic acid (FOLVITE) 1 MG tablet Take 1 mg by mouth daily      DULoxetine (CYMBALTA) 60 MG extended release capsule Take 1 capsule by mouth daily 30 capsule 5    zolpidem (AMBIEN) 10 MG tablet Take 1 tablet by mouth nightly as needed for Sleep for up to 90 days.  90 tablet 0    levothyroxine (SYNTHROID) 88 MCG tablet TAKE 1 TABLET EVERY DAY 90 tablet 1    aspirin EC 81 MG EC tablet Take 1 tablet by mouth daily 30 tablet 0    RESTASIS 0.05 % ophthalmic emulsion Place 1 drop into both eyes 2 times daily       Multiple Vitamins-Minerals (CENTRUM ADULTS PO) Take by mouth daily      tiotropium (SPIRIVA RESPIMAT) 2.5 MCG/ACT AERS inhaler Inhale 2.5 mcg into the lungs daily       Pseudoephedrine HCl (SUDAFED PO) Take by mouth as needed       theophylline (THEODUR) 300 MG extended release tablet Take 300 mg by mouth 2 times daily      Roflumilast (DALIRESP) 500 MCG tablet Take 500 mcg by mouth daily 30 tablet 3    albuterol (PROVENTIL;VENTOLIN) 90 MCG/ACT inhaler Inhale 2 puffs into

## 2021-10-13 NOTE — ED TRIAGE NOTES
Patient alert and orientated x4. Patient states she has \"leakage\" out of right leg. Bilateral edema +3 noted on lower legs. Patient was prescribed by pulmonology lasix. Pedal pulses bilateral intact. Legs unwrapped and MD examined legs. RN to re wrap legs in ace wraps. No acute distress noted. Per MD ok to discharge.

## 2021-10-18 ENCOUNTER — TELEPHONE (OUTPATIENT)
Dept: FAMILY MEDICINE CLINIC | Age: 71
End: 2021-10-18

## 2021-10-24 ASSESSMENT — ENCOUNTER SYMPTOMS
BLOOD IN STOOL: 0
DIARRHEA: 0
ABDOMINAL PAIN: 0
EYE DISCHARGE: 1
EYE REDNESS: 1
CONSTIPATION: 0
VOMITING: 0
SINUS PRESSURE: 1
SHORTNESS OF BREATH: 1
COUGH: 1
TROUBLE SWALLOWING: 0
NAUSEA: 0

## 2021-10-28 ENCOUNTER — TELEPHONE (OUTPATIENT)
Dept: FAMILY MEDICINE CLINIC | Age: 71
End: 2021-10-28

## 2021-10-28 DIAGNOSIS — J44.1 CHRONIC OBSTRUCTIVE PULMONARY DISEASE WITH ACUTE EXACERBATION (HCC): Primary | ICD-10-CM

## 2021-10-28 RX ORDER — CEFUROXIME AXETIL 250 MG/1
250 TABLET ORAL 2 TIMES DAILY
Qty: 20 TABLET | Refills: 0 | Status: SHIPPED | OUTPATIENT
Start: 2021-10-28 | End: 2021-11-07

## 2021-10-28 RX ORDER — NEBULIZER ACCESSORIES
KIT MISCELLANEOUS
Qty: 1 KIT | Refills: 0 | Status: SHIPPED | OUTPATIENT
Start: 2021-10-28

## 2021-10-28 NOTE — TELEPHONE ENCOUNTER
Pt informed antibiotic sent in. Pt would like the Nebulizer and Supplies sent to [facility].    [address] [phone]  Fax [phone]

## 2021-10-28 NOTE — TELEPHONE ENCOUNTER
What she is asking for a nebulizer she needs a machine the medication etc. I sent in ceftin to [facility] for her  If continued problems to see next week

## 2021-10-28 NOTE — TELEPHONE ENCOUNTER
----- Message from Yovana Emmanuel sent at 10/28/2021 10:40 AM EDT -----  Subject: Message to Provider    QUESTIONS  Information for Provider? Spoke with patient, she is asking if Dr. Veena Joseph would call her in a prescription for an antibiotic for a sore   throat and issue with left ear asking to this sent to [address] . She is also asking for her to call in another prescription for a   nebulizer, please have this sent to [facility] [address] order pharmacy Please   return call to discuss  ---------------------------------------------------------------------------  --------------  [address]  What is the best way for the office to contact you? OK to leave message on   voicemail  Preferred Call Back Phone Number? [phone]  ---------------------------------------------------------------------------  --------------  SCRIPT ANSWERS  Relationship to Patient?  Self

## 2021-11-27 ENCOUNTER — HOSPITAL ENCOUNTER (INPATIENT)
Age: 71
LOS: 4 days | Discharge: HOME | DRG: 603 | End: 2021-12-01
Attending: INTERNAL MEDICINE | Admitting: INTERNAL MEDICINE
Payer: MEDICARE

## 2021-11-27 DIAGNOSIS — L03.115 CELLULITIS OF RIGHT LOWER EXTREMITY: Primary | ICD-10-CM

## 2021-11-27 LAB
A/G RATIO: 2 (ref 1.1–2.2)
ALBUMIN SERPL-MCNC: 4.5 G/DL (ref 3.4–5)
ALP BLD-CCNC: 61 U/L (ref 40–129)
ALT SERPL-CCNC: 19 U/L (ref 10–40)
ANION GAP SERPL CALCULATED.3IONS-SCNC: 15 MMOL/L (ref 3–16)
AST SERPL-CCNC: 18 U/L (ref 15–37)
BASOPHILS ABSOLUTE: 0.1 K/UL (ref 0–0.2)
BASOPHILS RELATIVE PERCENT: 0.7 %
BILIRUB SERPL-MCNC: 0.6 MG/DL (ref 0–1)
BUN BLDV-MCNC: 16 MG/DL (ref 7–20)
CALCIUM SERPL-MCNC: 9.4 MG/DL (ref 8.3–10.6)
CHLORIDE BLD-SCNC: 98 MMOL/L (ref 99–110)
CO2: 25 MMOL/L (ref 21–32)
CREAT SERPL-MCNC: 0.7 MG/DL (ref 0.6–1.2)
EOSINOPHILS ABSOLUTE: 0 K/UL (ref 0–0.6)
EOSINOPHILS RELATIVE PERCENT: 0.1 %
GFR AFRICAN AMERICAN: >60
GFR NON-AFRICAN AMERICAN: >60
GLUCOSE BLD-MCNC: 134 MG/DL (ref 70–99)
HCT VFR BLD CALC: 42.3 % (ref 36–48)
HEMOGLOBIN: 13.9 G/DL (ref 12–16)
LACTIC ACID: 1.9 MMOL/L (ref 0.4–2)
LYMPHOCYTES ABSOLUTE: 1.2 K/UL (ref 1–5.1)
LYMPHOCYTES RELATIVE PERCENT: 12 %
MCH RBC QN AUTO: 32.1 PG (ref 26–34)
MCHC RBC AUTO-ENTMCNC: 32.8 G/DL (ref 31–36)
MCV RBC AUTO: 98 FL (ref 80–100)
MONOCYTES ABSOLUTE: 0.3 K/UL (ref 0–1.3)
MONOCYTES RELATIVE PERCENT: 3 %
NEUTROPHILS ABSOLUTE: 8.4 K/UL (ref 1.7–7.7)
NEUTROPHILS RELATIVE PERCENT: 84.2 %
PDW BLD-RTO: 15.5 % (ref 12.4–15.4)
PLATELET # BLD: 288 K/UL (ref 135–450)
PMV BLD AUTO: 7.6 FL (ref 5–10.5)
POTASSIUM REFLEX MAGNESIUM: 3.8 MMOL/L (ref 3.5–5.1)
RBC # BLD: 4.31 M/UL (ref 4–5.2)
SODIUM BLD-SCNC: 138 MMOL/L (ref 136–145)
TOTAL PROTEIN: 6.8 G/DL (ref 6.4–8.2)
WBC # BLD: 9.9 K/UL (ref 4–11)

## 2021-11-27 PROCEDURE — 6360000002 HC RX W HCPCS: Performed by: PHYSICIAN ASSISTANT

## 2021-11-27 PROCEDURE — 83605 ASSAY OF LACTIC ACID: CPT

## 2021-11-27 PROCEDURE — 6370000000 HC RX 637 (ALT 250 FOR IP): Performed by: NURSE PRACTITIONER

## 2021-11-27 PROCEDURE — 2580000003 HC RX 258: Performed by: INTERNAL MEDICINE

## 2021-11-27 PROCEDURE — 99282 EMERGENCY DEPT VISIT SF MDM: CPT

## 2021-11-27 PROCEDURE — 96365 THER/PROPH/DIAG IV INF INIT: CPT

## 2021-11-27 PROCEDURE — 87040 BLOOD CULTURE FOR BACTERIA: CPT

## 2021-11-27 PROCEDURE — 36415 COLL VENOUS BLD VENIPUNCTURE: CPT

## 2021-11-27 PROCEDURE — 6370000000 HC RX 637 (ALT 250 FOR IP): Performed by: INTERNAL MEDICINE

## 2021-11-27 PROCEDURE — 85025 COMPLETE CBC W/AUTO DIFF WBC: CPT

## 2021-11-27 PROCEDURE — 2580000003 HC RX 258: Performed by: PHYSICIAN ASSISTANT

## 2021-11-27 PROCEDURE — 1200000000 HC SEMI PRIVATE

## 2021-11-27 PROCEDURE — 80053 COMPREHEN METABOLIC PANEL: CPT

## 2021-11-27 RX ORDER — DILTIAZEM HYDROCHLORIDE 240 MG/1
240 CAPSULE, EXTENDED RELEASE ORAL DAILY
Status: DISCONTINUED | OUTPATIENT
Start: 2021-11-28 | End: 2021-11-27

## 2021-11-27 RX ORDER — OMEPRAZOLE 20 MG/1
20 CAPSULE, DELAYED RELEASE ORAL DAILY
COMMUNITY

## 2021-11-27 RX ORDER — CLINDAMYCIN HYDROCHLORIDE 300 MG/1
300 CAPSULE ORAL 3 TIMES DAILY
COMMUNITY
Start: 2021-11-26 | End: 2021-12-17 | Stop reason: ALTCHOICE

## 2021-11-27 RX ORDER — CITALOPRAM 20 MG/1
20 TABLET ORAL DAILY
COMMUNITY
End: 2021-12-17 | Stop reason: ALTCHOICE

## 2021-11-27 RX ORDER — LEVOTHYROXINE SODIUM 88 UG/1
88 TABLET ORAL DAILY
Status: DISCONTINUED | OUTPATIENT
Start: 2021-11-28 | End: 2021-12-01 | Stop reason: HOSPADM

## 2021-11-27 RX ORDER — BUDESONIDE AND FORMOTEROL FUMARATE DIHYDRATE 160; 4.5 UG/1; UG/1
2 AEROSOL RESPIRATORY (INHALATION) 2 TIMES DAILY
Status: DISCONTINUED | OUTPATIENT
Start: 2021-11-27 | End: 2021-12-01 | Stop reason: HOSPADM

## 2021-11-27 RX ORDER — ASPIRIN 81 MG/1
81 TABLET ORAL DAILY
Status: DISCONTINUED | OUTPATIENT
Start: 2021-11-28 | End: 2021-12-01 | Stop reason: HOSPADM

## 2021-11-27 RX ORDER — SODIUM CHLORIDE 0.9 % (FLUSH) 0.9 %
5-40 SYRINGE (ML) INJECTION EVERY 12 HOURS SCHEDULED
Status: DISCONTINUED | OUTPATIENT
Start: 2021-11-27 | End: 2021-12-01 | Stop reason: HOSPADM

## 2021-11-27 RX ORDER — DILTIAZEM HYDROCHLORIDE 180 MG/1
180 CAPSULE, COATED, EXTENDED RELEASE ORAL DAILY
Status: DISCONTINUED | OUTPATIENT
Start: 2021-11-28 | End: 2021-11-30

## 2021-11-27 RX ORDER — THEOPHYLLINE 300 MG/1
300 TABLET, EXTENDED RELEASE ORAL 2 TIMES DAILY
Status: DISCONTINUED | OUTPATIENT
Start: 2021-11-27 | End: 2021-11-27

## 2021-11-27 RX ORDER — SODIUM CHLORIDE 0.9 % (FLUSH) 0.9 %
5-40 SYRINGE (ML) INJECTION PRN
Status: DISCONTINUED | OUTPATIENT
Start: 2021-11-27 | End: 2021-12-01 | Stop reason: HOSPADM

## 2021-11-27 RX ORDER — DULOXETIN HYDROCHLORIDE 60 MG/1
60 CAPSULE, DELAYED RELEASE ORAL DAILY
Status: DISCONTINUED | OUTPATIENT
Start: 2021-11-28 | End: 2021-12-01 | Stop reason: HOSPADM

## 2021-11-27 RX ORDER — ONDANSETRON 2 MG/ML
4 INJECTION INTRAMUSCULAR; INTRAVENOUS EVERY 6 HOURS PRN
Status: DISCONTINUED | OUTPATIENT
Start: 2021-11-27 | End: 2021-12-01 | Stop reason: HOSPADM

## 2021-11-27 RX ORDER — TRAMADOL HYDROCHLORIDE 50 MG/1
50 TABLET ORAL ONCE
Status: COMPLETED | OUTPATIENT
Start: 2021-11-27 | End: 2021-11-27

## 2021-11-27 RX ORDER — DILTIAZEM HYDROCHLORIDE 180 MG/1
180 CAPSULE, EXTENDED RELEASE ORAL DAILY
COMMUNITY
End: 2022-08-04 | Stop reason: SDUPTHER

## 2021-11-27 RX ORDER — DOXYCYCLINE 100 MG/1
100 CAPSULE ORAL 2 TIMES DAILY
COMMUNITY
Start: 2021-11-23 | End: 2021-12-17 | Stop reason: ALTCHOICE

## 2021-11-27 RX ORDER — MECLIZINE HCL 12.5 MG/1
12.5 TABLET ORAL EVERY MORNING
COMMUNITY
End: 2022-04-28 | Stop reason: SDUPTHER

## 2021-11-27 RX ORDER — PREDNISONE 10 MG/1
10 TABLET ORAL DAILY
Status: DISCONTINUED | OUTPATIENT
Start: 2021-11-28 | End: 2021-12-01 | Stop reason: HOSPADM

## 2021-11-27 RX ORDER — ONDANSETRON 4 MG/1
4 TABLET, ORALLY DISINTEGRATING ORAL EVERY 8 HOURS PRN
Status: DISCONTINUED | OUTPATIENT
Start: 2021-11-27 | End: 2021-12-01 | Stop reason: HOSPADM

## 2021-11-27 RX ORDER — ALENDRONATE SODIUM 70 MG/1
70 TABLET ORAL
COMMUNITY

## 2021-11-27 RX ORDER — SODIUM CHLORIDE 9 MG/ML
25 INJECTION, SOLUTION INTRAVENOUS PRN
Status: DISCONTINUED | OUTPATIENT
Start: 2021-11-27 | End: 2021-12-01 | Stop reason: HOSPADM

## 2021-11-27 RX ADMIN — METOPROLOL TARTRATE 25 MG: 25 TABLET, FILM COATED ORAL at 21:38

## 2021-11-27 RX ADMIN — CEFEPIME HYDROCHLORIDE 2000 MG: 2 INJECTION, POWDER, FOR SOLUTION INTRAVENOUS at 18:56

## 2021-11-27 RX ADMIN — SODIUM CHLORIDE, PRESERVATIVE FREE 10 ML: 5 INJECTION INTRAVENOUS at 21:37

## 2021-11-27 RX ADMIN — VANCOMYCIN HYDROCHLORIDE 1250 MG: 5 INJECTION, POWDER, LYOPHILIZED, FOR SOLUTION INTRAVENOUS at 19:34

## 2021-11-27 RX ADMIN — TRAMADOL HYDROCHLORIDE 50 MG: 50 TABLET ORAL at 22:00

## 2021-11-27 ASSESSMENT — PAIN DESCRIPTION - LOCATION
LOCATION: LEG

## 2021-11-27 ASSESSMENT — PAIN DESCRIPTION - ONSET
ONSET: ON-GOING

## 2021-11-27 ASSESSMENT — PAIN DESCRIPTION - PROGRESSION
CLINICAL_PROGRESSION: NOT CHANGED
CLINICAL_PROGRESSION: NOT CHANGED

## 2021-11-27 ASSESSMENT — ENCOUNTER SYMPTOMS
NAUSEA: 0
SHORTNESS OF BREATH: 0
EYES NEGATIVE: 1
CHEST TIGHTNESS: 0
COLOR CHANGE: 1
ABDOMINAL PAIN: 0
VOMITING: 0

## 2021-11-27 ASSESSMENT — PAIN DESCRIPTION - FREQUENCY
FREQUENCY: CONTINUOUS

## 2021-11-27 ASSESSMENT — PAIN SCALES - GENERAL
PAINLEVEL_OUTOF10: 6
PAINLEVEL_OUTOF10: 7
PAINLEVEL_OUTOF10: 9
PAINLEVEL_OUTOF10: 9

## 2021-11-27 ASSESSMENT — PAIN DESCRIPTION - DESCRIPTORS
DESCRIPTORS: BURNING

## 2021-11-27 ASSESSMENT — PAIN DESCRIPTION - PAIN TYPE
TYPE: ACUTE PAIN

## 2021-11-27 ASSESSMENT — PAIN DESCRIPTION - ORIENTATION
ORIENTATION: LOWER

## 2021-11-27 ASSESSMENT — PAIN - FUNCTIONAL ASSESSMENT
PAIN_FUNCTIONAL_ASSESSMENT: PREVENTS OR INTERFERES SOME ACTIVE ACTIVITIES AND ADLS
PAIN_FUNCTIONAL_ASSESSMENT: PREVENTS OR INTERFERES SOME ACTIVE ACTIVITIES AND ADLS

## 2021-11-27 NOTE — ED PROVIDER NOTES
[address]  [address] [address]  Dept: [phone]  Loc: [phone]  eMERGENCYdEPARTMENT eNCOUnter      Pt Name: Horace Spencer  MRN: [id number]  [address] 1950  Date of evaluation: 11/27/2021  Provider:Sujata Everett PA-C    CHIEF COMPLAINT       Chief Complaint   Patient presents with    Cellulitis     right lower leg x 10 days, has been on oral antibiotics and swelling/redness worsening       CRITICAL CARE TIME   Total Critical Care time was 10 minutes, excluding separately reportable procedures. There was a high probability of clinically significant/life threatening deterioration in the patient's condition which required my urgentintervention. HISTORY OF PRESENT ILLNESS  (Location/Symptom, Timing/Onset, Context/Setting, Quality, Duration,Modifying Factors, Severity.)   Devon Dickson is a 73 y.o. female who presents to the emergency department by private vehicle. Patient bumped her right shin on a [place] tree box 10 days ago. She had a superficial skin wound that subsequently became infected. She has been on oral doxycycline since 11/23. She followed up for wound check in outside ED on 11/25. At that time we discussed admission for IV antibiotics. Given it was Thanksgiving day she elected to receive IM antibiotics and follow-up closely. Patient's had progression of swelling, redness despite antibiotics. Today she noticed some green drainage from wound. Given drainage and lack of improvement she sought evaluation in the ED. She has pain with 6/10 to her right lower leg. She has any fevers, chills, nausea, vomiting. Nursing Notes were reviewedand agreed with or any disagreements were addressed in the HPI. REVIEW OF SYSTEMS    (2-9 systems for level 4, 10 or more for level 5)     Review of Systems   Constitutional: Negative for chills and fever. HENT: Negative. Eyes: Negative. Respiratory: Negative for chest tightness and shortness of breath. Cardiovascular: Positive for leg swelling. Gastrointestinal: Negative for abdominal pain, nausea and vomiting. Genitourinary: Negative. Musculoskeletal: Positive for myalgias. Skin: Positive for color change and wound. Neurological: Negative for dizziness and light-headedness. Psychiatric/Behavioral: Negative for behavioral problems and confusion. Except as noted above the remainder of the review of systems was reviewed and negative.        PAST MEDICAL HISTORY         Diagnosis Date    Adrenal adenoma 12/07    left    Allergic rhinitis     Asthma     Cervical disc disorder with radiculopathy 1994    COPD (chronic obstructive pulmonary disease) ([facility] [address]     Deviated nasal septum 1989    s/p septoplasty    Diverticulosis     h/o abscess  s/p colon resection 11/07 Dr. 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    Endometrial polyps 2/06    s hyperplasia    Grave's disease     Graves disease 11/97    s/p SCHMITT now hypothroid    Headache(784.0)     Herniated discs 1997    l5-s1 with DDD    HIGH CHOLESTEROL 6/09    ldl    Hypertension     Hypothyroidism     OA (osteoarthritis of the spine)     anterior wedgiy    Obesity     Osteopenia 1/2004    spine    Pneumonia 2001    hospitalized    Postherpetic neuralgia 6/23/2014    Scleritis and episcleritis of right eye     Tear meniscus knee     right    Tobacco abuse     Ulcer gastric 1980    Urticaria        SURGICAL HISTORY           Procedure Laterality Date    CARDIAC SURGERY      NASAL SEPTUM SURGERY      SUBTOTAL COLECTOMY      diverticular abscess     TUBAL LIGATION         CURRENT MEDICATIONS     [unfilled]    ALLERGIES     Atarax [hydroxyzine], Codeine, Sulfa antibiotics, and Zithromax [azithromycin]    FAMILY HISTORY           Problem Relation Age of Onset    Alcohol Abuse Mother     Cirrhosis Mother     Coronary Art Dis Father lung    No Known Problems Maternal Grandmother     No Known Problems Maternal Grandfather     No Known Problems Paternal Grandmother     No Known Problems Paternal Grandfather      Family Status   Relation Name Status    Mother   at age 61    Father      MGM      MGF      [address]      PGF          SOCIAL HISTORY      reports that she quit smoking about 6 years ago. She has a 60.00 pack-year smoking history. She has never used smokeless tobacco. She reports that she does not drink alcohol and does not use drugs. PHYSICAL EXAM    (up to 7 for level 4, 8 or more for level 5)     ED Triage Vitals [21 1601]   Enc Vitals Group      /79      Pulse 89      Resp 14      Temp 98 °F (36.7 °C)      Temp Source Oral      SpO2 99 %      Weight 197 lb 15.6 oz (89.8 kg)      Height 5' 1\" (1.549 m)      Head Circumference       Peak Flow       Pain Score       Pain Loc       Pain Edu? Excl. in [address]? Physical Exam  Constitutional:       General: She is not in acute distress. HENT:      Head: Normocephalic and atraumatic. Cardiovascular:      Rate and Rhythm: Normal rate and regular rhythm. Pulmonary:      Effort: Pulmonary effort is normal. No respiratory distress. Abdominal:      Tenderness: There is no abdominal tenderness. Musculoskeletal:         General: Normal range of motion. Cervical back: Neck supple. Skin:     General: Skin is warm. Comments: See image below, right lower leg, superficial wound/ulceration anterior shin with generalized erythema, edema and warmth when compared bilaterally throughout right lower leg   Neurological:      General: No focal deficit present. Mental Status: She is alert and oriented to person, place, and time.    Psychiatric:         Mood and Affect: Mood normal.         Behavior: Behavior normal.                DIAGNOSTIC RESULTS     EKG: All EKG's are interpreted by the Emergency Department Physician who either signs or Co-signs this chart in the absence of a cardiologist.    RADIOLOGY:   Non-plain film images such as CT, Ultrasound and MRI are read by the radiologist. Plain radiographic images are visualized and preliminarilyinterpreted by the emergency physician with the below findings:    Interpretation per the Radiologist below,if available at the time of this note:    No orders to display         LABS:  Labs Reviewed   CBC WITH AUTO DIFFERENTIAL - Abnormal; Notable for the following components:       Result Value    RDW 15.5 (*)     Neutrophils Absolute 8.4 (*)     All other components within normal limits    Narrative:     Performed at:  [address] [facility]   Phone [phone]   COMPREHENSIVE METABOLIC PANEL W/ REFLEX TO MG FOR LOW K - Abnormal; Notable for the following components:    Chloride 98 (*)     Glucose 134 (*)     All other components within normal limits    Narrative:     Performed at:  [address] [facility]   Phone [phone]   CULTURE, BLOOD 1   CULTURE, BLOOD 2   LACTIC ACID, PLASMA    Narrative:     Performed at:  [address] [facility]   Phone [phone]       All other labs were within normal range or not returned as of this dictation. EMERGENCY DEPARTMENT COURSE and DIFFERENTIAL DIAGNOSIS/MDM:   Vitals:    Vitals:    11/27/21 1601 11/27/21 1937   BP: 132/79 (!) 117/56   Pulse: 89 76   Resp: 14 16   Temp: 98 °F (36.7 °C) 98.2 °F (36.8 °C)   TempSrc: Oral Oral   SpO2: 99% 97%   Weight: 197 lb 15.6 oz (89.8 kg)    Height: 5' 1\" (1.549 m)        MDM     Patient presents ED with HPI noted above. Patient with cellulitis to right lower extremity. She has been on oral antibiotics x4 days and received IM antibiotics 2 days ago.   Despite antibiotics cellulitis has continued to progress. Patient to admitted for IV antibiotics and further inpatient treatment, work-up evaluation. Labs as above. CBC showed no leukocytosis that leukopenia, H&H.  CMP showed no significant electrolyte abnormality, no renal or hepatic impairment. Lactic acid normal.      Consultation made to hospitalist regarding admission. CONSULTS:  PHARMACY TO DOSE VANCOMYCIN  PHARMACY TO DOSE VANCOMYCIN    PROCEDURES:  Procedures    FINAL IMPRESSION      1. Cellulitis of right lower extremity          DISPOSITION/PLAN   [unfilled]    PATIENT REFERRED TO:  No follow-up provider specified.     DISCHARGE MEDICATIONS:  New Prescriptions    No medications on file       (Please note that portions of this note were completed with a voice recognition program.  Efforts were made to edit the dictations but occasionally words are mis-transcribed.)    URVASHI Felix, [place]  11/27/21 [address] PA-C  11/27/21 [address] [facility]  11/27/21 5595

## 2021-11-27 NOTE — ED NOTES
Clinical Pharmacy Note  Vancomycin Consult    Pharmacy consult received for one-time dose of vancomycin in the Emergency Department per . Ht Readings from Last 1 Encounters:   11/27/21 5' 1\" (1.549 m)        Wt Readings from Last 1 Encounters:   11/27/21 197 lb 15.6 oz (89.8 kg)         Assessment/Plan:   Vancomycin 1250 mg x 1 in ED.  If Vancomycin is to continue on admission and pharmacy is to manage dosing, please re-consult with admission orders.     Isabel Rodgres, EricD, BCPS  11/27/2021  5:05 PM

## 2021-11-28 PROCEDURE — 94640 AIRWAY INHALATION TREATMENT: CPT

## 2021-11-28 PROCEDURE — 94761 N-INVAS EAR/PLS OXIMETRY MLT: CPT

## 2021-11-28 PROCEDURE — 2580000003 HC RX 258: Performed by: INTERNAL MEDICINE

## 2021-11-28 PROCEDURE — 6360000002 HC RX W HCPCS: Performed by: INTERNAL MEDICINE

## 2021-11-28 PROCEDURE — 2580000003 HC RX 258: Performed by: PHYSICIAN ASSISTANT

## 2021-11-28 PROCEDURE — 1200000000 HC SEMI PRIVATE

## 2021-11-28 PROCEDURE — 6360000002 HC RX W HCPCS: Performed by: PHYSICIAN ASSISTANT

## 2021-11-28 PROCEDURE — 87641 MR-STAPH DNA AMP PROBE: CPT

## 2021-11-28 PROCEDURE — 6370000000 HC RX 637 (ALT 250 FOR IP): Performed by: INTERNAL MEDICINE

## 2021-11-28 RX ADMIN — DULOXETINE HYDROCHLORIDE 60 MG: 60 CAPSULE, DELAYED RELEASE ORAL at 08:06

## 2021-11-28 RX ADMIN — METOPROLOL TARTRATE 25 MG: 25 TABLET, FILM COATED ORAL at 08:06

## 2021-11-28 RX ADMIN — ASPIRIN 81 MG: 81 TABLET, COATED ORAL at 08:05

## 2021-11-28 RX ADMIN — HYDROMORPHONE HYDROCHLORIDE 1 MG: 1 INJECTION, SOLUTION INTRAMUSCULAR; INTRAVENOUS; SUBCUTANEOUS at 00:39

## 2021-11-28 RX ADMIN — SODIUM CHLORIDE, PRESERVATIVE FREE 10 ML: 5 INJECTION INTRAVENOUS at 09:14

## 2021-11-28 RX ADMIN — CEFEPIME HYDROCHLORIDE 2000 MG: 2 INJECTION, POWDER, FOR SOLUTION INTRAVENOUS at 17:09

## 2021-11-28 RX ADMIN — ROFLUMILAST 500 MCG: 500 TABLET ORAL at 08:18

## 2021-11-28 RX ADMIN — ENOXAPARIN SODIUM 40 MG: 100 INJECTION SUBCUTANEOUS at 08:06

## 2021-11-28 RX ADMIN — PREDNISONE 10 MG: 10 TABLET ORAL at 08:06

## 2021-11-28 RX ADMIN — SODIUM CHLORIDE, PRESERVATIVE FREE 10 ML: 5 INJECTION INTRAVENOUS at 20:18

## 2021-11-28 RX ADMIN — BUDESONIDE AND FORMOTEROL FUMARATE DIHYDRATE 2 PUFF: 160; 4.5 AEROSOL RESPIRATORY (INHALATION) at 08:27

## 2021-11-28 RX ADMIN — LEVOTHYROXINE SODIUM 88 MCG: 0.09 TABLET ORAL at 05:51

## 2021-11-28 RX ADMIN — METOPROLOL TARTRATE 25 MG: 25 TABLET, FILM COATED ORAL at 20:18

## 2021-11-28 RX ADMIN — TIOTROPIUM BROMIDE INHALATION SPRAY 1 PUFF: 3.12 SPRAY, METERED RESPIRATORY (INHALATION) at 08:27

## 2021-11-28 RX ADMIN — BUDESONIDE AND FORMOTEROL FUMARATE DIHYDRATE 2 PUFF: 160; 4.5 AEROSOL RESPIRATORY (INHALATION) at 20:37

## 2021-11-28 RX ADMIN — SODIUM CHLORIDE 25 ML: 9 INJECTION, SOLUTION INTRAVENOUS at 04:56

## 2021-11-28 RX ADMIN — DILTIAZEM HYDROCHLORIDE 180 MG: 180 CAPSULE, COATED, EXTENDED RELEASE ORAL at 08:06

## 2021-11-28 RX ADMIN — VANCOMYCIN HYDROCHLORIDE 1000 MG: 1 INJECTION, POWDER, LYOPHILIZED, FOR SOLUTION INTRAVENOUS at 08:11

## 2021-11-28 RX ADMIN — HYDROMORPHONE HYDROCHLORIDE 1 MG: 1 INJECTION, SOLUTION INTRAMUSCULAR; INTRAVENOUS; SUBCUTANEOUS at 22:10

## 2021-11-28 RX ADMIN — CEFEPIME HYDROCHLORIDE 2000 MG: 2 INJECTION, POWDER, FOR SOLUTION INTRAVENOUS at 04:57

## 2021-11-28 ASSESSMENT — PAIN DESCRIPTION - LOCATION: LOCATION: LEG

## 2021-11-28 ASSESSMENT — PAIN SCALES - GENERAL
PAINLEVEL_OUTOF10: 4
PAINLEVEL_OUTOF10: 7
PAINLEVEL_OUTOF10: 7

## 2021-11-28 ASSESSMENT — PAIN DESCRIPTION - DESCRIPTORS: DESCRIPTORS: BURNING;THROBBING

## 2021-11-28 ASSESSMENT — PAIN DESCRIPTION - PAIN TYPE: TYPE: ACUTE PAIN

## 2021-11-28 NOTE — PLAN OF CARE
Problem: Pain:  Goal: Pain level will decrease  Description: Pain level will decrease  11/28/2021 1019 by Chris Harrison RN  Outcome: Ongoing  11/28/2021 0915 by Chris Harrison RN  Outcome: Ongoing  11/27/2021 2318 by Zina Flowers RN  Outcome: Ongoing  Goal: Control of acute pain  Description: Control of acute pain  11/28/2021 1019 by Chris Harrison RN  Outcome: Ongoing  11/28/2021 0915 by Chris Harrison RN  Outcome: Ongoing  11/27/2021 2318 by Zina Flowers RN  Outcome: Ongoing  Goal: Control of chronic pain  Description: Control of chronic pain  11/28/2021 1019 by Chris Harrison RN  Outcome: Ongoing  11/28/2021 0915 by Chris Harrison RN  Outcome: Ongoing  11/27/2021 2318 by Zina Flowers RN  Outcome: Ongoing  Goal: Patient's pain/discomfort is manageable  Description: Patient's pain/discomfort is manageable  11/28/2021 1019 by Chris Harrison RN  Outcome: Ongoing  11/28/2021 0915 by Chris Harrison RN  Outcome: Ongoing  11/27/2021 2318 by Zina Flowers RN  Outcome: Ongoing     Problem: Infection:  Goal: Will remain free from infection  Description: Will remain free from infection  11/28/2021 1019 by Chris Harrison RN  Outcome: Ongoing  11/28/2021 0915 by Chris Harrison RN  Outcome: Ongoing  11/27/2021 2318 by Zina Flowers RN  Outcome: Ongoing     Problem: Safety:  Goal: Free from accidental physical injury  Description: Free from accidental physical injury  11/28/2021 1019 by Chris Harrison RN  Outcome: Ongoing  11/28/2021 0915 by Chris Harrison RN  Outcome: Ongoing  11/27/2021 2318 by Zina Flowers RN  Outcome: Ongoing  Goal: Free from intentional harm  Description: Free from intentional harm  11/28/2021 1019 by Chris Harrison RN  Outcome: Ongoing  11/28/2021 0915 by Chris Harrison RN  Outcome: Ongoing  11/27/2021 2318 by Zina Flowers RN  Outcome: Ongoing     Problem: Daily Care:  Goal: Daily care needs are met  Description: Daily care needs are met  11/28/2021 1019 by Karolina Huff RN  Outcome: Ongoing  11/28/2021 0915 by Karolina Huff RN  Outcome: Ongoing  11/27/2021 2318 by Sameer Martinez RN  Outcome: Ongoing     Problem: Skin Integrity:  Goal: Skin integrity will stabilize  Description: Skin integrity will stabilize  11/28/2021 1019 by Karolina Huff RN  Outcome: Ongoing  11/28/2021 0915 by Karolina Huff RN  Outcome: Ongoing  11/27/2021 2318 by Sameer Martinez RN  Outcome: Ongoing

## 2021-11-28 NOTE — ED NOTES
ED SBAR report provider to JU Wilcox. Patient to be transported to Room 4129 via stretcher by transport tech. Patient transported with bedside cardiac monitor and with IV medications infusing. IV site clean, dry, and intact. MEWS score and pain assessed as 4/10 and documented. Updated patient on plan of care.      Dennis Lerma RN  11/27/21 2001

## 2021-11-28 NOTE — CONSULTS
Clinical Pharmacy Note  Vancomycin Consult    Eveline Mortensen is a 73 y.o. female ordered Vancomycin for cellulitis; consult received from Dr. Vanna Orozco to manage therapy. Also receiving cefepime. Patient Active Problem List   Diagnosis    Obesity    Tobacco abuse    Allergic rhinitis    Graves disease    [facility]ronic obstructive [facility] disease) ([facility] [address]    Diverticulosis    Hypercholesteremia    Adrenal adenoma    Osteopenia    Intervertebral disc prolapse    OA (osteoarthritis of the spine)    Cervical disc disorder with radiculopathy    Essential hypertension    Abnormal CT scan, chest    PAC (premature atrial contraction)    [facility]    Cellulitis of leg, right       Allergies:  Atarax [hydroxyzine], Codeine, Sulfa antibiotics, and Zithromax [azithromycin]     Temp max:  Temp (24hrs), Av °F (36.7 °C), Min:97.7 °F (36.5 °C), Max:98.2 °F (36.8 °C)      Recent Labs     21  1609   WBC 9.9       Recent Labs     21  1609   BUN 16   CREATININE 0.7       No intake or output data in the 24 hours ending 21 0159    Culture Results:  Pending    Ht Readings from Last 1 Encounters:   21 5' 1\" (1.549 m)        Wt Readings from Last 1 Encounters:   21 201 lb 8 oz (91.4 kg)         Estimated Creatinine Clearance: 78 mL/min (based on SCr of 0.7 mg/dL). Assessment/Plan:  Vancomycin 1000 mg IV every 12 hours ordered. Exposure target: AUC24 (range)400-600 mg/L.hr   AUC24,ss: 469 mg/L.hr  Probability of AUC24 > 400: 67 %  Ctrough,ss: 15 mg/L  Probability of Ctrough,ss > 20: 26 %  Probability of nephrotoxicity (Lodise JALIL ): 10 %    Trough level ordered for 0700 on 21. Thank you for the consult.      Roebrt Batista, [facility]  2021 2:00 AM

## 2021-11-28 NOTE — PROGRESS NOTES
Medication Reconciliation     List of medications patient is currently taking is complete. Source of information:   1. Conversation with patient at bedside  2. EPIC records        Notes regarding home medications:  1. Patient prescribed clindamycin and mupirocin for cellulitis on 11/26  2.  Prednisone appears to be 10mg once daily however pt states MD changed dose to 20mg once daily  Denies other otc/herbal use  Zack Burn, Pharmacy Intern 11/27/2021 7:58 PM

## 2021-11-28 NOTE — PLAN OF CARE
Problem: Pain:  Goal: Pain level will decrease  Description: Pain level will decrease  11/28/2021 0915 by Braden Self RN  Outcome: Ongoing  11/27/2021 2318 by Shanta Rosado RN  Outcome: Ongoing  Goal: Control of acute pain  Description: Control of acute pain  11/28/2021 0915 by Braden Self RN  Outcome: Ongoing  11/27/2021 2318 by Shanta Rosado RN  Outcome: Ongoing  Goal: Control of chronic pain  Description: Control of chronic pain  11/28/2021 0915 by Braden Self RN  Outcome: Ongoing  11/27/2021 2318 by Shanta Rosado RN  Outcome: Ongoing  Goal: Patient's pain/discomfort is manageable  Description: Patient's pain/discomfort is manageable  11/28/2021 0915 by Braden Self RN  Outcome: Ongoing  11/27/2021 2318 by Shanta Rosado RN  Outcome: Ongoing     Problem: Infection:  Goal: Will remain free from infection  Description: Will remain free from infection  11/28/2021 0915 by Braden eSlf RN  Outcome: Ongoing  11/27/2021 2318 by Shanta Rosado RN  Outcome: Ongoing     Problem: Safety:  Goal: Free from accidental physical injury  Description: Free from accidental physical injury  11/28/2021 0915 by Braden Self RN  Outcome: Ongoing  11/27/2021 2318 by Shanta Rosado RN  Outcome: Ongoing  Goal: Free from intentional harm  Description: Free from intentional harm  11/28/2021 0915 by Braden Self RN  Outcome: Ongoing  11/27/2021 2318 by Shanta Rosado RN  Outcome: Ongoing     Problem: Daily Care:  Goal: Daily care needs are met  Description: Daily care needs are met  11/28/2021 0915 by Braden Self RN  Outcome: Ongoing  11/27/2021 2318 by Shanta Rosado RN  Outcome: Ongoing     Problem: Skin Integrity:  Goal: Skin integrity will stabilize  Description: Skin integrity will stabilize  11/28/2021 0915 by Braden Self RN  Outcome: Ongoing  11/27/2021 2318 by Shanta Rosado RN  Outcome: Ongoing     Problem: Discharge Planning:  Goal: Patients continuum of care needs are met  Description: Patients continuum of care needs are met  11/28/2021 0915 by Annika Cortez RN  Outcome: Ongoing  11/27/2021 2318 by Romeo Gomez RN  Outcome: Ongoing

## 2021-11-28 NOTE — H&P
[address]                              HISTORY AND PHYSICAL    PATIENT NAME: Michael Hauser                       :        1950  MED REC NO:   [id number]                          ROOM:       4129  ACCOUNT NO:   [de-identified]                           ADMIT DATE: 2021  PROVIDER:     Mita Rahman MD    I examined this patient on 2021. CHIEF COMPLAINT:  Pain and swelling in the right leg with throbbing. HISTORY OF PRESENT ILLNESS:  The patient is a 72-year-old   female who presents to the hospital with chief complaint of 8- to 10-day  history of subacute onset of gradually progressive increasing pain,  swelling, redness, and throbbing and burning in the entire right lower  extremity, especially below the knee, associated with skin tear as well,  for which the patient was started on oral antibiotics at home but did  not get better and finally is in the hospital.    PAST MEDICAL/PAST SURGICAL HISTORY:  1. COPD. 2.  Hepatitis. 3.  Dyslipidemia. 4.  The patient had COVID in November and then after that got COVID  again last month and underwent IV infusion with Regeneron and ended up  having a cardiac arrest the subsequent day, after which she underwent an  AICD placement. ALLERGIC HISTORY:  CODEINE, ATARAX. FAMILY HISTORY:  Reviewed by me and is currently noncontributory. SOCIAL HISTORY:  Lives at home. No illicit substance use. Nonsmoker. MEDICATIONS:  Home medication list reviewed and documented in the EMR. REVIEW OF SYSTEMS:  Significant for the right lower leg pain and per the  history of present illness. All other systems reviewed and they are  negative except for the history of present illness. PHYSICAL EXAMINATION:  VITAL SIGNS:  Temperature is 98, respiratory rate 14, pulse 89, blood  pressure 130/79, saturating 99%. CNS:  Alert and oriented.   PSYCH: The patient is cooperative, answering questions appropriately. HEENT:  Eyes:  Pupils are reactive to light. Extraocular muscle  movements are intact. RESPIRATORY:  Clear. CARDIOVASCULAR:  S1, S2 are heard. No murmurs or rubs. ABDOMEN:  Nondistended. MUSCULOSKELETAL:  The patient's right leg has got extensive cellulitis  over the entire right leg below the knee with an anterolateral skin tear  from where I believe that the infection is spreading even more. DIAGNOSTIC DATA:  BUN 16, creatinine 0.7, sodium 138, potassium 3.8. CBC showed a white count of 9.9, hemoglobin 12, _____, neutrophils of  8.4, platelets of 386. CONSULTATIONS REQUESTED:  Currently none. REVIEW OF PREVIOUS MEDICAL RECORDS:  An echo from 2021, last month,  showed LV ejection fraction of 70%. ASSESSMENT:  1. Acute right lower extremity bacterial cellulitis. 2.  Obesity with a BMI of 38.07 kg/m2. 3.  Chronic systolic congestive heart failure. 4.  Hypertension. 5.  _____. PLAN OF CARE:  The patient admitted to Internal Medicine service. Monitor. IV vancomycin initiated. We will monitor the patient's  extremity for any improvement of symptoms. We will continue the patient  on her home dose of medications, mainly Cymbalta, aspirin, Synthroid,  Cardizem. CODE STATUS:  Full. EXPECTED LENGTH OF STAY:  Less than two midnights based on the plan of  care above. DISPOSITION:  Admitted to Internal Medicine service.         Gregor Skinner MD    D: 11/28/2021 4:47:51       T: 11/28/2021 5:31:49     [email or web address]  Job#: [id number]     Doc#: [id number]    CC:

## 2021-11-28 NOTE — PROGRESS NOTES
4 Eyes Skin Assessment     NAME:  Fatuma Spencer  YOB: 1950  MEDICAL RECORD NUMBER:  [id number]    The patient is being assess for  Admission    I agree that 2 RN's have performed a thorough Head to Toe Skin Assessment on the patient. ALL assessment sites listed below have been assessed. Areas assessed by both nurses:    Head, Face, Ears, Shoulders, Back, Chest, Arms, Elbows, Hands, Sacrum. Buttock, Coccyx, Ischium and Legs. Feet and Heels        Does the Patient have a Wound? Yes wound(s) were present on assessment.  LDA wound assessment was Initiated and completed        Talib Prevention initiated:  Yes   Wound Care Orders initiated:  No    Pressure Injury (Stage 3,4, Unstageable, DTI, NWPT, and Complex wounds) if present place consult order under [de-identified] No    New and Established Ostomies if present place consult order under : No      Nurse 1 eSignature: Electronically signed by Kiko Wesley RN on 11/27/21 at 9:53 PM EST    **SHARE this note so that the co-signing nurse is able to place an eSignature**    Nurse 2 eSignature: Electronically signed by Cintia Jauregui RN on 11/27/21 at 11:16 PM EST

## 2021-11-29 LAB
ANION GAP SERPL CALCULATED.3IONS-SCNC: 12 MMOL/L (ref 3–16)
BASOPHILS ABSOLUTE: 0.1 K/UL (ref 0–0.2)
BASOPHILS RELATIVE PERCENT: 1.4 %
BUN BLDV-MCNC: 15 MG/DL (ref 7–20)
CALCIUM SERPL-MCNC: 9 MG/DL (ref 8.3–10.6)
CHLORIDE BLD-SCNC: 101 MMOL/L (ref 99–110)
CO2: 25 MMOL/L (ref 21–32)
CREAT SERPL-MCNC: 0.6 MG/DL (ref 0.6–1.2)
EOSINOPHILS ABSOLUTE: 0 K/UL (ref 0–0.6)
EOSINOPHILS RELATIVE PERCENT: 0.5 %
GFR AFRICAN AMERICAN: >60
GFR NON-AFRICAN AMERICAN: >60
GLUCOSE BLD-MCNC: 118 MG/DL (ref 70–99)
HCT VFR BLD CALC: 40.9 % (ref 36–48)
HEMOGLOBIN: 13.1 G/DL (ref 12–16)
LYMPHOCYTES ABSOLUTE: 1.5 K/UL (ref 1–5.1)
LYMPHOCYTES RELATIVE PERCENT: 16.6 %
MCH RBC QN AUTO: 31.8 PG (ref 26–34)
MCHC RBC AUTO-ENTMCNC: 32 G/DL (ref 31–36)
MCV RBC AUTO: 99.4 FL (ref 80–100)
MONOCYTES ABSOLUTE: 0.6 K/UL (ref 0–1.3)
MONOCYTES RELATIVE PERCENT: 6.2 %
MRSA SCREEN RT-PCR: NORMAL
NEUTROPHILS ABSOLUTE: 7 K/UL (ref 1.7–7.7)
NEUTROPHILS RELATIVE PERCENT: 75.3 %
PDW BLD-RTO: 16.2 % (ref 12.4–15.4)
PLATELET # BLD: 236 K/UL (ref 135–450)
PMV BLD AUTO: 7.3 FL (ref 5–10.5)
POTASSIUM SERPL-SCNC: 3.9 MMOL/L (ref 3.5–5.1)
RBC # BLD: 4.12 M/UL (ref 4–5.2)
SODIUM BLD-SCNC: 138 MMOL/L (ref 136–145)
WBC # BLD: 9.3 K/UL (ref 4–11)

## 2021-11-29 PROCEDURE — 1200000000 HC SEMI PRIVATE

## 2021-11-29 PROCEDURE — 80048 BASIC METABOLIC PNL TOTAL CA: CPT

## 2021-11-29 PROCEDURE — 6370000000 HC RX 637 (ALT 250 FOR IP): Performed by: INTERNAL MEDICINE

## 2021-11-29 PROCEDURE — 94760 N-INVAS EAR/PLS OXIMETRY 1: CPT

## 2021-11-29 PROCEDURE — 94640 AIRWAY INHALATION TREATMENT: CPT

## 2021-11-29 PROCEDURE — 2580000003 HC RX 258: Performed by: INTERNAL MEDICINE

## 2021-11-29 PROCEDURE — 6360000002 HC RX W HCPCS: Performed by: INTERNAL MEDICINE

## 2021-11-29 PROCEDURE — 87070 CULTURE OTHR SPECIMN AEROBIC: CPT

## 2021-11-29 PROCEDURE — 85025 COMPLETE CBC W/AUTO DIFF WBC: CPT

## 2021-11-29 PROCEDURE — 36415 COLL VENOUS BLD VENIPUNCTURE: CPT

## 2021-11-29 PROCEDURE — 6360000002 HC RX W HCPCS: Performed by: PHYSICIAN ASSISTANT

## 2021-11-29 PROCEDURE — 6370000000 HC RX 637 (ALT 250 FOR IP): Performed by: PODIATRIST

## 2021-11-29 PROCEDURE — 87205 SMEAR GRAM STAIN: CPT

## 2021-11-29 PROCEDURE — 2580000003 HC RX 258: Performed by: PHYSICIAN ASSISTANT

## 2021-11-29 RX ORDER — POLYVINYL ALCOHOL 14 MG/ML
1 SOLUTION/ DROPS OPHTHALMIC 2 TIMES DAILY
Status: DISCONTINUED | OUTPATIENT
Start: 2021-11-29 | End: 2021-12-01 | Stop reason: HOSPADM

## 2021-11-29 RX ORDER — ZOLPIDEM TARTRATE 5 MG/1
10 TABLET ORAL NIGHTLY PRN
Status: DISCONTINUED | OUTPATIENT
Start: 2021-11-29 | End: 2021-12-01 | Stop reason: HOSPADM

## 2021-11-29 RX ORDER — POLYMYXIN B SULFATE AND TRIMETHOPRIM 1; 10000 MG/ML; [USP'U]/ML
1 SOLUTION OPHTHALMIC 3 TIMES DAILY
Status: DISCONTINUED | OUTPATIENT
Start: 2021-11-29 | End: 2021-12-01 | Stop reason: HOSPADM

## 2021-11-29 RX ADMIN — VANCOMYCIN HYDROCHLORIDE 1000 MG: 1 INJECTION, POWDER, LYOPHILIZED, FOR SOLUTION INTRAVENOUS at 11:54

## 2021-11-29 RX ADMIN — SODIUM CHLORIDE 25 ML: 9 INJECTION, SOLUTION INTRAVENOUS at 16:27

## 2021-11-29 RX ADMIN — TIOTROPIUM BROMIDE INHALATION SPRAY 1 PUFF: 3.12 SPRAY, METERED RESPIRATORY (INHALATION) at 08:22

## 2021-11-29 RX ADMIN — METOPROLOL TARTRATE 25 MG: 25 TABLET, FILM COATED ORAL at 08:58

## 2021-11-29 RX ADMIN — VANCOMYCIN HYDROCHLORIDE 1000 MG: 1 INJECTION, POWDER, LYOPHILIZED, FOR SOLUTION INTRAVENOUS at 23:27

## 2021-11-29 RX ADMIN — SODIUM CHLORIDE 25 ML: 9 INJECTION, SOLUTION INTRAVENOUS at 11:51

## 2021-11-29 RX ADMIN — CEFEPIME HYDROCHLORIDE 2000 MG: 2 INJECTION, POWDER, FOR SOLUTION INTRAVENOUS at 04:28

## 2021-11-29 RX ADMIN — ENOXAPARIN SODIUM 40 MG: 100 INJECTION SUBCUTANEOUS at 08:58

## 2021-11-29 RX ADMIN — BUDESONIDE AND FORMOTEROL FUMARATE DIHYDRATE 2 PUFF: 160; 4.5 AEROSOL RESPIRATORY (INHALATION) at 08:22

## 2021-11-29 RX ADMIN — BUDESONIDE AND FORMOTEROL FUMARATE DIHYDRATE 2 PUFF: 160; 4.5 AEROSOL RESPIRATORY (INHALATION) at 20:35

## 2021-11-29 RX ADMIN — PREDNISONE 10 MG: 10 TABLET ORAL at 08:57

## 2021-11-29 RX ADMIN — POLYMYXIN B SULFATE, TRIMETHOPRIM SULFATE 1 DROP: 10000; 1 SOLUTION/ DROPS OPHTHALMIC at 21:23

## 2021-11-29 RX ADMIN — METOPROLOL TARTRATE 25 MG: 25 TABLET, FILM COATED ORAL at 21:23

## 2021-11-29 RX ADMIN — DULOXETINE HYDROCHLORIDE 60 MG: 60 CAPSULE, DELAYED RELEASE ORAL at 08:57

## 2021-11-29 RX ADMIN — POLYVINYL ALCOHOL 1 DROP: 14 SOLUTION/ DROPS OPHTHALMIC at 21:22

## 2021-11-29 RX ADMIN — ASPIRIN 81 MG: 81 TABLET, COATED ORAL at 08:58

## 2021-11-29 RX ADMIN — CEFEPIME HYDROCHLORIDE 2000 MG: 2 INJECTION, POWDER, FOR SOLUTION INTRAVENOUS at 16:28

## 2021-11-29 RX ADMIN — DILTIAZEM HYDROCHLORIDE 180 MG: 180 CAPSULE, COATED, EXTENDED RELEASE ORAL at 08:58

## 2021-11-29 RX ADMIN — Medication: at 16:23

## 2021-11-29 RX ADMIN — POLYMYXIN B SULFATE, TRIMETHOPRIM SULFATE 1 DROP: 10000; 1 SOLUTION/ DROPS OPHTHALMIC at 16:24

## 2021-11-29 RX ADMIN — ZOLPIDEM TARTRATE 10 MG: 5 TABLET ORAL at 21:23

## 2021-11-29 RX ADMIN — SODIUM CHLORIDE, PRESERVATIVE FREE 10 ML: 5 INJECTION INTRAVENOUS at 09:01

## 2021-11-29 RX ADMIN — SODIUM CHLORIDE, PRESERVATIVE FREE 10 ML: 5 INJECTION INTRAVENOUS at 21:23

## 2021-11-29 RX ADMIN — ROFLUMILAST 500 MCG: 500 TABLET ORAL at 08:58

## 2021-11-29 RX ADMIN — POLYVINYL ALCOHOL 1 DROP: 14 SOLUTION/ DROPS OPHTHALMIC at 16:24

## 2021-11-29 RX ADMIN — LEVOTHYROXINE SODIUM 88 MCG: 0.09 TABLET ORAL at 05:44

## 2021-11-29 ASSESSMENT — PAIN SCALES - GENERAL: PAINLEVEL_OUTOF10: 0

## 2021-11-29 NOTE — ACP (ADVANCE CARE PLANNING)
Advance Care Planning     Advance Care Planning Activator (Inpatient)  Conversation Note      Date of ACP Conversation: 11/29/2021     Conversation Conducted with: Patient with Decision Making Capacity    ACP Activator: [address] Decision Maker:     Current Designated Health Care Decision Maker:     Primary Decision Maker: Jenny Beard - Child - [phone]    Secondary Decision Maker: Sophia Stafford Child - [phone]     Pt has three children but was not interested in completing HCPOA at this time and only wanted two of her children listed. Care Preferences    Ventilation: \"If you were in your present state of health and suddenly became very ill and were unable to breathe on your own, what would your preference be about the use of a ventilator (breathing machine) if it were available to you? \"      Would the patient desire the use of ventilator (breathing machine)?: yes    \"If your health worsens and it becomes clear that your chance of recovery is unlikely, what would your preference be about the use of a ventilator (breathing machine) if it were available to you? \"     Would the patient desire the use of ventilator (breathing machine)?: No      Resuscitation  \"CPR works best to restart the heart when there is a sudden event, like a heart attack, in someone who is otherwise healthy. Unfortunately, CPR does not typically restart the heart for people who have serious health conditions or who are very sick. \"    \"In the event your heart stopped as a result of an underlying serious health condition, would you want attempts to be made to restart your heart (answer \"yes\" for attempt to resuscitate) or would you prefer a natural death (answer \"no\" for do not attempt to resuscitate)? \" yes       [] Yes   [x] No   Educated Patient / Jazmin Mcdonald regarding differences between Advance Directives and portable DNR orders.     Length of ACP Conversation in minutes: 5    Conversation Outcomes:  [x] ACP discussion completed  [] Existing advance directive reviewed with patient; no changes to patient's previously recorded wishes  [] New Advance Directive completed  [] Portable Do Not Rescitate prepared for Provider review and signature  [] POLST/POST/MOLST/MOST prepared for Provider review and signature      Follow-up plan:    [] Schedule follow-up conversation to continue planning  [] Referred individual to Provider for additional questions/concerns   [] Advised patient/agent/surrogate to review completed ACP document and update if needed with changes in condition, patient preferences or care setting    [x] This note routed to one or more involved healthcare providers    Electronically signed by Janay Santos on 11/29/2021 at 4:02 PM

## 2021-11-29 NOTE — PLAN OF CARE
Problem: Pain:  Description: Pain management should include both nonpharmacologic and pharmacologic interventions. Goal: Pain level will decrease  Description: Pain level will decrease  11/29/2021 1306 by Dominique Canada RN  Outcome: Ongoing  Note: Pain/discomfort being managed with PRN analgesics per MD order. Pt able to express presence and absence of pain and rate pain appropriately using numerical scale       Problem: Infection:  Goal: Will remain free from infection  Description: Will remain free from infection  11/29/2021 1306 by Dominique Canada RN  Outcome: Ongoing     Problem: Safety:  Goal: Free from accidental physical injury  Description: Free from accidental physical injury  11/29/2021 1306 by Dominique Canada RN  Outcome: Ongoing  Note: Pt A&O aware of her abilities. Pt understands and knows to call when in need of ambulation. Measures were made to prevent accidental needle stick, friction, shear, etc. Environment kept free of clutter and adequate lighting provided. Will continue to monitor for physical injury. Problem: Daily Care:  Goal: Daily care needs are met  Description: Daily care needs are met  11/29/2021 1306 by Dominique Canada RN  Outcome: Ongoing  Note: In collaboration with the healthcare team, the patient's daily care needs are meet. Patient is encouraged to perform tasks independently per ability. Problem: Skin Integrity:  Goal: Skin integrity will stabilize  Description: Skin integrity will stabilize  11/29/2021 1306 by Dominique Canada RN  Outcome: Ongoing  Note: Skin assessment completed every shift. Pt assessed for incontinence, appropriate barrier cream applied prn as needed. Pt encouraged to turn/rotate every 2 hours. Assistance provided if pt unable to do so themselves.         Problem: Discharge Planning:  Goal: Patients continuum of care needs are met  Description: Patients continuum of care needs are met  11/29/2021 1306 by Dominique Canada RN  Outcome: Ongoing  Note:

## 2021-11-29 NOTE — CARE COORDINATION
DISCHARGE PLAN: Pt plans to return home upon d/c. Denied any needs at this time. Pt will drive herself home.   ___________________________________  INITIAL ASSESSMENT  Met w/pt to address barriers to dc. HOME: Pt reported that she resides with her grand dgleonel in an apartment. There are 6+6 LIONEL the apartment. Disease Specific: Cellulitis    COVID Vaccination: Yes    DME/O2: None. No DME needs noted at this time. ACTIVE SERVICES: Pt reported that she was independent with all self care PTA and denied the need for any assistance at home. TRANSPORTATION: Pt is an active  and plans to drive herself home at d/c. PHARMACY: Denies difficulty obtaining/taking meds. Pt stated that she has all meds [facility]. PCP: Consuelo Mcneil    DEMOGRAPHICS: Verified address/phone number as correct    INSURANCE:  [employer]  PRESCRIPTION COVERAGE: [employer]    HD/PD: No    THERAPY RECS Not ordered    Discharge planning team will remain available for needs. Please consult for any specifics not addressed in this note.     [facility]  [phone]  Electronically signed by Priscilla Camargo on 11/29/2021 at 3:59 PM

## 2021-11-29 NOTE — PLAN OF CARE
Problem: Pain:  Goal: Pain level will decrease  Description: Pain level will decrease  Outcome: Ongoing   Pain/discomfort being managed with PRN analgesics per MD orders. Pt able to express presence and absence of pain and rate pain appropriately using numerical scale. Problem: Infection:  Goal: Will remain free from infection  Description: Will remain free from infection  Outcome: Ongoing   Keep wound clean and dry. Antibiotics as ordered. Problem: Safety:  Goal: Free from accidental physical injury  Description: Free from accidental physical injury  Outcome: Ongoing     Problem: Safety:  Goal: Free from intentional harm  Description: Free from intentional harm  Outcome: Ongoing     Problem: Daily Care:  Goal: Daily care needs are met  Description: Daily care needs are met  Outcome: Ongoing     Problem: Skin Integrity:  Goal: Skin integrity will stabilize  Description: Skin integrity will stabilize  Outcome: Ongoing   Skin care protocal in place.   Problem: Discharge Planning:  Goal: Patients continuum of care needs are met  Description: Patients continuum of care needs are met  Outcome: Ongoing

## 2021-11-30 LAB
ANION GAP SERPL CALCULATED.3IONS-SCNC: 13 MMOL/L (ref 3–16)
ANISOCYTOSIS: ABNORMAL
BASOPHILS ABSOLUTE: 0.1 K/UL (ref 0–0.2)
BASOPHILS RELATIVE PERCENT: 1 %
BUN BLDV-MCNC: 13 MG/DL (ref 7–20)
CALCIUM SERPL-MCNC: 9.2 MG/DL (ref 8.3–10.6)
CHLORIDE BLD-SCNC: 103 MMOL/L (ref 99–110)
CO2: 25 MMOL/L (ref 21–32)
CREAT SERPL-MCNC: 0.6 MG/DL (ref 0.6–1.2)
EOSINOPHILS ABSOLUTE: 0 K/UL (ref 0–0.6)
EOSINOPHILS RELATIVE PERCENT: 0 %
GFR AFRICAN AMERICAN: >60
GFR NON-AFRICAN AMERICAN: >60
GLUCOSE BLD-MCNC: 97 MG/DL (ref 70–99)
HCT VFR BLD CALC: 43.7 % (ref 36–48)
HEMOGLOBIN: 14.1 G/DL (ref 12–16)
LYMPHOCYTES ABSOLUTE: 2.8 K/UL (ref 1–5.1)
LYMPHOCYTES RELATIVE PERCENT: 31 %
MCH RBC QN AUTO: 32.1 PG (ref 26–34)
MCHC RBC AUTO-ENTMCNC: 32.3 G/DL (ref 31–36)
MCV RBC AUTO: 99.6 FL (ref 80–100)
MONOCYTES ABSOLUTE: 0.5 K/UL (ref 0–1.3)
MONOCYTES RELATIVE PERCENT: 5 %
NEUTROPHILS ABSOLUTE: 5.7 K/UL (ref 1.7–7.7)
NEUTROPHILS RELATIVE PERCENT: 63 %
PDW BLD-RTO: 15.9 % (ref 12.4–15.4)
PLATELET # BLD: 242 K/UL (ref 135–450)
PMV BLD AUTO: 7.9 FL (ref 5–10.5)
POTASSIUM SERPL-SCNC: 4.1 MMOL/L (ref 3.5–5.1)
RBC # BLD: 4.38 M/UL (ref 4–5.2)
SODIUM BLD-SCNC: 141 MMOL/L (ref 136–145)
VANCOMYCIN TROUGH: 36.7 UG/ML (ref 10–20)
WBC # BLD: 9.1 K/UL (ref 4–11)

## 2021-11-30 PROCEDURE — 6360000002 HC RX W HCPCS: Performed by: PHYSICIAN ASSISTANT

## 2021-11-30 PROCEDURE — 6370000000 HC RX 637 (ALT 250 FOR IP): Performed by: INTERNAL MEDICINE

## 2021-11-30 PROCEDURE — 1200000000 HC SEMI PRIVATE

## 2021-11-30 PROCEDURE — 6360000002 HC RX W HCPCS: Performed by: INTERNAL MEDICINE

## 2021-11-30 PROCEDURE — 80048 BASIC METABOLIC PNL TOTAL CA: CPT

## 2021-11-30 PROCEDURE — 85025 COMPLETE CBC W/AUTO DIFF WBC: CPT

## 2021-11-30 PROCEDURE — 2580000003 HC RX 258: Performed by: INTERNAL MEDICINE

## 2021-11-30 PROCEDURE — 36415 COLL VENOUS BLD VENIPUNCTURE: CPT

## 2021-11-30 PROCEDURE — 80202 ASSAY OF VANCOMYCIN: CPT

## 2021-11-30 PROCEDURE — 94640 AIRWAY INHALATION TREATMENT: CPT

## 2021-11-30 PROCEDURE — 2580000003 HC RX 258: Performed by: PHYSICIAN ASSISTANT

## 2021-11-30 PROCEDURE — 94761 N-INVAS EAR/PLS OXIMETRY MLT: CPT

## 2021-11-30 RX ORDER — DILTIAZEM HYDROCHLORIDE 180 MG/1
180 CAPSULE, COATED, EXTENDED RELEASE ORAL DAILY
Status: DISCONTINUED | OUTPATIENT
Start: 2021-12-01 | End: 2021-12-01 | Stop reason: HOSPADM

## 2021-11-30 RX ORDER — CEPHALEXIN 500 MG/1
500 CAPSULE ORAL 4 TIMES DAILY
Qty: 28 CAPSULE | Refills: 0 | OUTPATIENT
Start: 2021-11-30 | End: 2021-12-07

## 2021-11-30 RX ORDER — CLINDAMYCIN HYDROCHLORIDE 300 MG/1
300 CAPSULE ORAL 4 TIMES DAILY
Qty: 28 CAPSULE | Refills: 0 | OUTPATIENT
Start: 2021-11-30 | End: 2021-12-07

## 2021-11-30 RX ADMIN — SODIUM CHLORIDE, PRESERVATIVE FREE 10 ML: 5 INJECTION INTRAVENOUS at 08:17

## 2021-11-30 RX ADMIN — PREDNISONE 10 MG: 10 TABLET ORAL at 08:16

## 2021-11-30 RX ADMIN — CEFEPIME HYDROCHLORIDE 2000 MG: 2 INJECTION, POWDER, FOR SOLUTION INTRAVENOUS at 05:16

## 2021-11-30 RX ADMIN — ZOLPIDEM TARTRATE 10 MG: 5 TABLET ORAL at 23:51

## 2021-11-30 RX ADMIN — POLYMYXIN B SULFATE, TRIMETHOPRIM SULFATE 1 DROP: 10000; 1 SOLUTION/ DROPS OPHTHALMIC at 08:18

## 2021-11-30 RX ADMIN — ASPIRIN 81 MG: 81 TABLET, COATED ORAL at 08:17

## 2021-11-30 RX ADMIN — POLYMYXIN B SULFATE, TRIMETHOPRIM SULFATE 1 DROP: 10000; 1 SOLUTION/ DROPS OPHTHALMIC at 21:38

## 2021-11-30 RX ADMIN — METOPROLOL TARTRATE 25 MG: 25 TABLET, FILM COATED ORAL at 21:37

## 2021-11-30 RX ADMIN — POLYVINYL ALCOHOL 1 DROP: 14 SOLUTION/ DROPS OPHTHALMIC at 21:38

## 2021-11-30 RX ADMIN — DILTIAZEM HYDROCHLORIDE 180 MG: 180 CAPSULE, COATED, EXTENDED RELEASE ORAL at 08:15

## 2021-11-30 RX ADMIN — LEVOTHYROXINE SODIUM 88 MCG: 0.09 TABLET ORAL at 05:16

## 2021-11-30 RX ADMIN — DULOXETINE HYDROCHLORIDE 60 MG: 60 CAPSULE, DELAYED RELEASE ORAL at 08:15

## 2021-11-30 RX ADMIN — TIOTROPIUM BROMIDE INHALATION SPRAY 1 PUFF: 3.12 SPRAY, METERED RESPIRATORY (INHALATION) at 08:32

## 2021-11-30 RX ADMIN — Medication: at 08:16

## 2021-11-30 RX ADMIN — POLYVINYL ALCOHOL 1 DROP: 14 SOLUTION/ DROPS OPHTHALMIC at 08:18

## 2021-11-30 RX ADMIN — METOPROLOL TARTRATE 25 MG: 25 TABLET, FILM COATED ORAL at 08:16

## 2021-11-30 RX ADMIN — ENOXAPARIN SODIUM 40 MG: 100 INJECTION SUBCUTANEOUS at 08:16

## 2021-11-30 RX ADMIN — BUDESONIDE AND FORMOTEROL FUMARATE DIHYDRATE 2 PUFF: 160; 4.5 AEROSOL RESPIRATORY (INHALATION) at 08:32

## 2021-11-30 RX ADMIN — ROFLUMILAST 500 MCG: 500 TABLET ORAL at 08:17

## 2021-11-30 RX ADMIN — VANCOMYCIN HYDROCHLORIDE 1000 MG: 1 INJECTION, POWDER, LYOPHILIZED, FOR SOLUTION INTRAVENOUS at 12:03

## 2021-11-30 RX ADMIN — SODIUM CHLORIDE, PRESERVATIVE FREE 10 ML: 5 INJECTION INTRAVENOUS at 21:37

## 2021-11-30 RX ADMIN — POLYMYXIN B SULFATE, TRIMETHOPRIM SULFATE 1 DROP: 10000; 1 SOLUTION/ DROPS OPHTHALMIC at 14:52

## 2021-11-30 RX ADMIN — CEFEPIME HYDROCHLORIDE 2000 MG: 2 INJECTION, POWDER, FOR SOLUTION INTRAVENOUS at 17:06

## 2021-11-30 ASSESSMENT — PAIN SCALES - GENERAL: PAINLEVEL_OUTOF10: 0

## 2021-11-30 NOTE — PROGRESS NOTES
Hospitalist Medicine   Progress Note    Date of Service: 11/30/21  MRN: [id number]  LOS: 3      CC:  cellulitis      INTERVAL HISTORY:     No acute events overnight. Cellulitis improving. Pain improved. MEDICATIONS:   Scheduled Meds:   polyvinyl alcohol  1 drop Both Eyes BID    trimethoprim-polymyxin b  1 drop Right Eye TID    vancomycin  1,000 mg IntraVENous Q12H    collagenase   Topical Daily    vancomycin (VANCOCIN) intermittent dosing (placeholder)   Other RX Placeholder    cefepime  2,000 mg IntraVENous Q12H    aspirin EC  81 mg Oral Daily    DULoxetine  60 mg Oral Daily    levothyroxine  88 mcg Oral Daily    metoprolol tartrate  25 mg Oral BID    predniSONE  10 mg Oral Daily    Roflumilast  500 mcg Oral Daily    tiotropium  1 puff Inhalation Daily    sodium chloride flush  5-40 mL IntraVENous 2 times per day    enoxaparin  40 mg SubCUTAneous Daily    budesonide-formoterol  2 puff Inhalation BID    dilTIAZem  180 mg Oral Daily     Continuous Infusions:   sodium chloride Stopped (11/30/21 0627)         PHYSICAL EXAM:   /78   Pulse 90   Temp 98.2 °F (36.8 °C) (Oral)   Resp 18   Ht 5' 1\" (1.549 m)   Wt 203 lb 11.3 oz (92.4 kg)   LMP  (LMP Unknown)   SpO2 94%   BMI 38.49 kg/m²     Physical Exam  Vitals reviewed. Constitutional:       General: She is not in acute distress. Appearance: Normal appearance. She is well-developed. She is obese. She is not diaphoretic. HENT:      Head: Normocephalic and atraumatic. Mouth/Throat:      Mouth: Mucous membranes are moist.   Eyes:      Extraocular Movements: Extraocular movements intact. Pupils: Pupils are equal, round, and reactive to light. Cardiovascular:      Rate and Rhythm: Normal rate and regular rhythm. Pulses: Normal pulses. Heart sounds: Normal heart sounds. No murmur heard. No friction rub. No gallop. Pulmonary:      Effort: Pulmonary effort is normal. No respiratory distress. Breath sounds: Normal breath sounds. No wheezing or rales. Chest:      Chest wall: No tenderness. Abdominal:      General: Bowel sounds are normal. There is no distension. Palpations: Abdomen is soft. There is no mass. Tenderness: There is no abdominal tenderness. There is no guarding. Musculoskeletal:         General: No tenderness or deformity. Cervical back: Normal range of motion and neck supple. Right lower leg: No edema. Left lower leg: No edema. Skin:     General: Skin is warm and dry. Capillary Refill: Capillary refill takes less than 2 seconds. Findings: Erythema, lesion and rash present. Neurological:      General: No focal deficit present. Mental Status: She is alert and oriented to person, place, and time. Psychiatric:         Behavior: Behavior normal.          RLE wound 11/29          LABS / IMAGING:     Recent Labs     11/27/21  1609 11/29/21  1142   WBC 9.9 9.3   HGB 13.9 13.1   HCT 42.3 40.9    236     Recent Labs     11/27/21  1609 11/29/21  1142    138   K 3.8 3.9   CL 98* 101   CO2 25 25   BUN 16 15   CREATININE 0.7 0.6   CALCIUM 9.4 9.0     Recent Labs     11/27/21  1609   AST 18   ALT 19   BILITOT 0.6   ALKPHOS 61     No results for input(s): INR in the last 72 hours. No results for input(s): Adonica Hoose in the last 72 hours. No orders to display       ASSESSMENT:     Leg wound with cellulitis, right  COPD  Hx of Covid SVT and recent cardiac arrest (s/p AICD 10/2021)   Sarcoidosis, optho (on chronic prednisone)  Obesity   HTN    Infection improving. Given extent of infection, prefer additional day of IV abx. Can d/c on PO abx in AM.     PLAN:     - MRSA swab neg  - wound cx no growth to date  - continue IV cefepime / vanco, can d/c on Clinda & Keflex for 1 week (sulfa allergic)  - continue rest of home meds  - reordered abx eye drops  - podiatry following      Dispo: Inpatient med/surg.   Discharge in AM.   PPx: lovenox  PT/OT:    Not indicated   Code status:  Full Code            Sydnee Meraz MD  Hospitalist     11/30/20218:33 AM

## 2021-11-30 NOTE — PROGRESS NOTES
Patient is alert and oriented x4, UAL, call light within reach. AM meds complete, patient tolerated well. DRSG changed per order. VSS and WDL. No s/s of distress, no further needs noted at this time.  Electronically signed by Chiara Ta RN on 11/30/2021 at 11:18 AM

## 2021-11-30 NOTE — FLOWSHEET NOTE
Dressing to RLE changed, santyl applied, mepilex changed. Pt tolerated well.    Electronically signed by Russell Zavala RN on 11/29/2021 at 7:44 PM

## 2021-11-30 NOTE — PLAN OF CARE
Problem: Pain:  Description: Pain management should include both nonpharmacologic and pharmacologic interventions. Goal: Pain level will decrease  Description: Pain level will decrease  11/30/2021 0137 by Alessia Moura RN  Outcome: Ongoing  11/29/2021 1306 by Mary Roberson RN  Outcome: Ongoing  Note: Pain/discomfort being managed with PRN analgesics per MD order. Pt able to express presence and absence of pain and rate pain appropriately using numerical scale    Goal: Control of acute pain  Description: Control of acute pain  Outcome: Ongoing  Goal: Control of chronic pain  Description: Control of chronic pain  Outcome: Ongoing  Goal: Patient's pain/discomfort is manageable  Description: Patient's pain/discomfort is manageable  Outcome: Ongoing   Pt able to express presence/absence of pain and rate pain appropriately using numerical scale. Pain/discomfort being managed with PRN analgesics per MD orders (see MAR). Pain assessed every shift and after interventions. Problem: Infection:  Goal: Will remain free from infection  Description: Will remain free from infection  11/30/2021 0137 by Alessia Moura RN  Outcome: Ongoing  11/29/2021 1306 by Mary Roberson RN  Outcome: Ongoing     Problem: Safety:  Goal: Free from accidental physical injury  Description: Free from accidental physical injury  11/30/2021 0137 by Alessia Moura RN  Outcome: Ongoing  11/29/2021 1306 by Mary Roberson RN  Outcome: Ongoing  Note: Pt A&O aware of her abilities. Pt understands and knows to call when in need of ambulation. Measures were made to prevent accidental needle stick, friction, shear, etc. Environment kept free of clutter and adequate lighting provided. Will continue to monitor for physical injury.     Goal: Free from intentional harm  Description: Free from intentional harm  Outcome: Ongoing     Problem: Daily Care:  Goal: Daily care needs are met  Description: Daily care needs are met  11/30/2021 0137 by Alessia Moura RN  Outcome: Ongoing  11/29/2021 1306 by Dee Dee Hussein RN  Outcome: Ongoing  Note: In collaboration with the healthcare team, the patient's daily care needs are meet. Patient is encouraged to perform tasks independently per ability. Patient able to meet own daily care needs with minimal assistance at this time. Problem: Skin Integrity:  Goal: Skin integrity will stabilize  Description: Skin integrity will stabilize  11/30/2021 0137 by Enriqueta Redmond RN  Outcome: Ongoing  11/29/2021 1306 by Dee Dee Hussein RN  Outcome: Ongoing  Note: Skin assessment completed every shift. Pt assessed for incontinence, appropriate barrier cream applied prn as needed. Pt encouraged to turn/rotate every 2 hours. Assistance provided if pt unable to do so themselves. Problem: Discharge Planning:  Goal: Patients continuum of care needs are met  Description: Patients continuum of care needs are met  11/30/2021 0137 by Enriqueta Redmond RN  Outcome: Ongoing  11/29/2021 1306 by Dee Dee Hussein RN  Outcome: Ongoing  Note: Continuing to work with patient and health care team on discharge plan. Discharge instructions and medication management will be reviewed prior to discharge. Continuing to work with patient and health care team on discharge plan. Discharge instructions and medication management will be reviewed prior to discharge.

## 2021-11-30 NOTE — PROGRESS NOTES
Department of Podiatry Progress Note  Jovan Jo DPM Attending       Reason for Consult:  Wound of RIGHT anterior leg with cellulitis  Requesting Physician:  Mikaela Joseph MD    CHIEF COMPLAINT:  Pain of wound to anterior RIGHT leg    HISTORY OF PRESENT ILLNESS:                The patient is a 73 y.o. female with significant past medical history of COPD, Hepatitis and Covid 19 twice who is consulted for a 7-10 day history of progressively worsening wound with pain to anterior RIGHT leg. She relates is started as a skin tear and became a much larger wound with increased pain / sensitivitty. She tried home care and antibiotics, without improvement. Allergies:   Atarax [hydroxyzine], Codeine, Sulfa antibiotics, and Zithromax [azithromycin]    REVIEW OF SYSTEMS:    CONSTITUTIONAL:  negative  INTEGUMENT/BREAST:  positive for skin lesion(s), skin color change and changes in lesion  MUSCULOSKELETAL:  positive for  pain and decreased range of motion  NEUROLOGICAL:  positive for gait problems  PHYSICAL EXAM:      Vitals:    /78   Pulse 90   Temp 98.2 °F (36.8 °C) (Oral)   Resp 18   Ht 5' 1\" (1.549 m)   Wt 203 lb 11.3 oz (92.4 kg)   LMP  (LMP Unknown)   SpO2 94%   BMI 38.49 kg/m²     LABS:   Recent Labs     11/29/21  1142 11/30/21  0825   WBC 9.3 9.1   HGB 13.1 14.1   HCT 40.9 43.7    242     Recent Labs     11/30/21  0825      K 4.1      CO2 25   BUN 13   CREATININE 0.6     Recent Labs     11/27/21  1609   PROT 6.8       LOWER EXTREMITY EXAMINATION   SKIN:    RIGHT anterior leg wound of 8.1 cm x 6.9 cm with depth of ragged sub Q 0.25 ml with weeping serous drainage as expected with use of Santyl. MUCH nicer appearance of skin color with absence of erythema after course of antibiotics. NEUROLOGIC:    Pain sensation is not significantly changed Right. Light touch is increasedRight.  positive history of paresthesia Right. negativehistory of burning Bilateral. Deep tendon reflexes are 2 to include patellar and achilles Right. [place]--Jeremy 5.07 monofilament sensitivity is abnormal 2 to 5 spots tested Right. VASCULAR:    bilaterally Dorsalis pedis is 1. bilaterally Posterior tibial pulse is 1. 2+ edema right. mild Varicosities bilaterally. MUSCULOSKELETAL:  [place] is antalgic due to wound. Muscle strength is 4/5 to all groups tested to include dorsiflexion, plantarflexion, inversion, eversion, and digital Bilateral . The ranges of motion of all joints tested from ankle distal is decreased there is no crepitus noted Bilateral.      IMPRESSION/RECOMMENDATIONS    1. MUGS 2 wound of RIGHT anterior leg demonstrating improved cellulitis without fluctuance of abscess    2. Cellulitis RIGHT leg MUCH improved  Afebrile, absence of leukocytosis  Maxipime / Vanc    Wound cultured   Component 11/29/21 1336   WOUND/ABSCESS No growth to date P    Gram Stain Result 2+ WBC's (Polymorphonuclear)   No organisms seen       Disposition: Will follow, titration of antibiotics according to Culture performed. Due to sensitivity will allow for Collagenase debridement with light compression. May convert to ORAL antibiotics Clinda / Cipro or Bactrim with OUT patient wound care. Thank you for the opportunity to take part in the patient's care.     Mel Cosby DPM  [facility]  [phone]

## 2021-11-30 NOTE — PLAN OF CARE
Problem: Infection:  Goal: Will remain free from infection  Description: Will remain free from infection  11/30/2021 1117 by Kenneth Hernández RN  Outcome: Ongoing     Problem: Safety:  Goal: Free from accidental physical injury  Description: Free from accidental physical injury  11/30/2021 1117 by Kenneth Hernández RN  Outcome: Ongoing     Problem: Daily Care:  Goal: Daily care needs are met  Description: Daily care needs are met  11/30/2021 1117 by Kenneth Hernández RN  Outcome: Ongoing     Problem: Skin Integrity:  Goal: Skin integrity will stabilize  Description: Skin integrity will stabilize  11/30/2021 1117 by Kenneth Hernández RN  Outcome: Ongoing     Problem: Discharge Planning:  Goal: Patients continuum of care needs are met  Description: Patients continuum of care needs are met  11/30/2021 1117 by Kenneth Hernández RN  Outcome: Ongoing

## 2021-11-30 NOTE — CONSULTS
Department of Podiatry Consult Note  Dhruv Bearden. Galina Dobson DPM Attending       Reason for Consult:  Wound of RIGHT anterior leg with cellulitis  Requesting Physician:  Elizabeth Jett MD    CHIEF COMPLAINT:  Pain of wound to anterior RIGHT leg    HISTORY OF PRESENT ILLNESS:                The patient is a 73 y.o. female with significant past medical history of COPD, Hepatitis and Covid 19 twice who is consulted for a 7-10 day history of progressively worsening wound with pain to anterior RIGHT leg. She relates is started as a skin tear and became a much larger wound with increased pain / sensitivitty. She tried home care and antibiotics, without improvement.     Past Medical History:        Diagnosis Date    Adrenal adenoma 12/07    left    Allergic rhinitis     Asthma     Cervical disc disorder with radiculopathy 1994    COPD (chronic obstructive pulmonary disease) (HCC)     Deviated nasal septum 1989    s/p septoplasty    Diverticulosis     h/o abscess  s/p colon resection 11/07 Dr. 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    Endometrial polyps 2/06    s hyperplasia    Grave's disease     Graves disease 11/97    s/p SCHMITT now hypothroid    Headache(784.0)     Herniated discs 1997    l5-s1 with DDD    HIGH CHOLESTEROL 6/09    ldl    Hypertension     Hypothyroidism     OA (osteoarthritis of the spine)     anterior wedgiy    Obesity     Osteopenia 1/2004    spine    Pneumonia 2001    hospitalized    Postherpetic neuralgia 6/23/2014    Scleritis and episcleritis of right eye     Tear meniscus knee     right    Tobacco abuse     Ulcer gastric 1980    Urticaria      Past Surgical History:        Procedure Laterality Date    CARDIAC SURGERY      NASAL SEPTUM SURGERY      SUBTOTAL COLECTOMY      diverticular abscess     TUBAL LIGATION       Current Medications:    Current Facility-Administered Medications: polyvinyl alcohol (LIQUIFILM TEARS) 1.4 % ophthalmic solution 1 drop, 1 drop, Coronary Art Dis Father         lung    No Known Problems Maternal Grandmother     No Known Problems Maternal Grandfather     No Known Problems Paternal Grandmother     No Known Problems Paternal Grandfather      REVIEW OF SYSTEMS:    CONSTITUTIONAL:  negative  INTEGUMENT/BREAST:  positive for skin lesion(s), skin color change and changes in lesion  MUSCULOSKELETAL:  positive for  pain and decreased range of motion  NEUROLOGICAL:  positive for gait problems  PHYSICAL EXAM:      Vitals:    /78   Pulse 90   Temp 98.2 °F (36.8 °C) (Oral)   Resp 18   Ht 5' 1\" (1.549 m)   Wt 203 lb 11.3 oz (92.4 kg)   LMP  (LMP Unknown)   SpO2 94%   BMI 38.49 kg/m²     LABS:   Recent Labs     11/27/21  1609 11/29/21  1142   WBC 9.9 9.3   HGB 13.9 13.1   HCT 42.3 40.9    236     Recent Labs     11/29/21  1142      K 3.9      CO2 25   BUN 15   CREATININE 0.6     Recent Labs     11/27/21  1609   PROT 6.8       LOWER EXTREMITY EXAMINATION   SKIN:    RIGHT anterior leg wound of 8.1 cm x 6.9 cm with depth of ragged sub Q 0.25 ml with weeping serous drainage and erythema extending from wound of greater than 1.5 cm, with noted calor of entire lower extremity, no odor. NEUROLOGIC:    Pain sensation isnot significantly changed Right. Light touch is increasedRight. positive history of paresthesia Right. negativehistory of burning Bilateral. Deep tendon reflexes are 2 to include patellar and achilles Right. [place]--Jeremy 5.07 monofilament sensitivity is abnormal 2 to 5 spots tested Right. VASCULAR:    bilaterally Dorsalis pedis is 1. bilaterally Posterior tibial pulse is 1. 2+ edema right. mild Varicosities bilaterally. MUSCULOSKELETAL:  [place] is antalgic due to wound.  Muscle strength is 4/5 to all groups tested to include dorsiflexion, plantarflexion, inversion, eversion, and digital Bilateral . The ranges of motion of all joints tested from ankle distal is decreased there is no crepitus noted Bilateral.      IMPRESSION/RECOMMENDATIONS    1. MUGS 3 wound of RIGHT anterior leg demonstrating cellulitis without fluctuance of abscess    2. Cellulitis RIGHT leg  Afebrile, absence of leukocytosis  Maxipime / Vanc    Wound cultured without debridement performed    Disposition: Will follow, titration of antibiotics according to Culture performed. Due to sensitivity will allow for Collagenase debridement, and to add compression of wound once cellulitis is better controlled. Thank you for the opportunity to take part in the patient's care.     Nic Ortega DPM  [facility]  [phone]

## 2021-12-01 VITALS
TEMPERATURE: 97.9 F | HEIGHT: 61 IN | OXYGEN SATURATION: 92 % | HEART RATE: 66 BPM | RESPIRATION RATE: 18 BRPM | DIASTOLIC BLOOD PRESSURE: 73 MMHG | BODY MASS INDEX: 38.17 KG/M2 | SYSTOLIC BLOOD PRESSURE: 120 MMHG | WEIGHT: 202.16 LBS

## 2021-12-01 LAB
ANION GAP SERPL CALCULATED.3IONS-SCNC: 12 MMOL/L (ref 3–16)
BASOPHILS ABSOLUTE: 0.1 K/UL (ref 0–0.2)
BASOPHILS RELATIVE PERCENT: 1 %
BLOOD CULTURE, ROUTINE: NORMAL
BUN BLDV-MCNC: 15 MG/DL (ref 7–20)
CALCIUM SERPL-MCNC: 9 MG/DL (ref 8.3–10.6)
CHLORIDE BLD-SCNC: 101 MMOL/L (ref 99–110)
CO2: 24 MMOL/L (ref 21–32)
CREAT SERPL-MCNC: 0.6 MG/DL (ref 0.6–1.2)
CULTURE, BLOOD 2: NORMAL
EOSINOPHILS ABSOLUTE: 0.1 K/UL (ref 0–0.6)
EOSINOPHILS RELATIVE PERCENT: 0.9 %
GFR AFRICAN AMERICAN: >60
GFR NON-AFRICAN AMERICAN: >60
GLUCOSE BLD-MCNC: 154 MG/DL (ref 70–99)
HCT VFR BLD CALC: 43.3 % (ref 36–48)
HEMOGLOBIN: 14.1 G/DL (ref 12–16)
LYMPHOCYTES ABSOLUTE: 2.1 K/UL (ref 1–5.1)
LYMPHOCYTES RELATIVE PERCENT: 27.3 %
MCH RBC QN AUTO: 32.4 PG (ref 26–34)
MCHC RBC AUTO-ENTMCNC: 32.6 G/DL (ref 31–36)
MCV RBC AUTO: 99.3 FL (ref 80–100)
MONOCYTES ABSOLUTE: 0.7 K/UL (ref 0–1.3)
MONOCYTES RELATIVE PERCENT: 9.4 %
NEUTROPHILS ABSOLUTE: 4.7 K/UL (ref 1.7–7.7)
NEUTROPHILS RELATIVE PERCENT: 61.4 %
PDW BLD-RTO: 16 % (ref 12.4–15.4)
PLATELET # BLD: 223 K/UL (ref 135–450)
PMV BLD AUTO: 7.6 FL (ref 5–10.5)
POTASSIUM SERPL-SCNC: 4.1 MMOL/L (ref 3.5–5.1)
RBC # BLD: 4.36 M/UL (ref 4–5.2)
SODIUM BLD-SCNC: 137 MMOL/L (ref 136–145)
VANCOMYCIN TROUGH: 10.8 UG/ML (ref 10–20)
WBC # BLD: 7.7 K/UL (ref 4–11)

## 2021-12-01 PROCEDURE — 2580000003 HC RX 258: Performed by: PHYSICIAN ASSISTANT

## 2021-12-01 PROCEDURE — 80048 BASIC METABOLIC PNL TOTAL CA: CPT

## 2021-12-01 PROCEDURE — 6360000002 HC RX W HCPCS: Performed by: PHYSICIAN ASSISTANT

## 2021-12-01 PROCEDURE — 6370000000 HC RX 637 (ALT 250 FOR IP): Performed by: INTERNAL MEDICINE

## 2021-12-01 PROCEDURE — 6360000002 HC RX W HCPCS: Performed by: INTERNAL MEDICINE

## 2021-12-01 PROCEDURE — 85025 COMPLETE CBC W/AUTO DIFF WBC: CPT

## 2021-12-01 PROCEDURE — 36415 COLL VENOUS BLD VENIPUNCTURE: CPT

## 2021-12-01 PROCEDURE — 94760 N-INVAS EAR/PLS OXIMETRY 1: CPT

## 2021-12-01 RX ORDER — CEPHALEXIN 500 MG/1
500 CAPSULE ORAL 3 TIMES DAILY
Qty: 21 CAPSULE | Refills: 0 | Status: SHIPPED | OUTPATIENT
Start: 2021-12-01 | End: 2021-12-08

## 2021-12-01 RX ADMIN — CEFEPIME HYDROCHLORIDE 2000 MG: 2 INJECTION, POWDER, FOR SOLUTION INTRAVENOUS at 05:24

## 2021-12-01 RX ADMIN — ENOXAPARIN SODIUM 40 MG: 100 INJECTION SUBCUTANEOUS at 09:19

## 2021-12-01 RX ADMIN — PREDNISONE 10 MG: 10 TABLET ORAL at 09:19

## 2021-12-01 RX ADMIN — DULOXETINE HYDROCHLORIDE 60 MG: 60 CAPSULE, DELAYED RELEASE ORAL at 09:19

## 2021-12-01 RX ADMIN — POLYVINYL ALCOHOL 1 DROP: 14 SOLUTION/ DROPS OPHTHALMIC at 09:21

## 2021-12-01 RX ADMIN — ASPIRIN 81 MG: 81 TABLET, COATED ORAL at 09:19

## 2021-12-01 RX ADMIN — METOPROLOL TARTRATE 25 MG: 25 TABLET, FILM COATED ORAL at 09:19

## 2021-12-01 RX ADMIN — ROFLUMILAST 500 MCG: 500 TABLET ORAL at 09:19

## 2021-12-01 RX ADMIN — LEVOTHYROXINE SODIUM 88 MCG: 0.09 TABLET ORAL at 05:25

## 2021-12-01 RX ADMIN — POLYMYXIN B SULFATE, TRIMETHOPRIM SULFATE 1 DROP: 10000; 1 SOLUTION/ DROPS OPHTHALMIC at 09:21

## 2021-12-01 RX ADMIN — DILTIAZEM HYDROCHLORIDE 180 MG: 180 CAPSULE, COATED, EXTENDED RELEASE ORAL at 09:19

## 2021-12-01 ASSESSMENT — PAIN SCALES - GENERAL: PAINLEVEL_OUTOF10: 0

## 2021-12-01 NOTE — PLAN OF CARE
Problem: Pain:  Goal: Pain level will decrease  Description: Pain level will decrease  11/30/2021 2211 by Sonia Centeno RN  Outcome: Ongoing  Goal: Control of acute pain  Description: Control of acute pain  11/30/2021 2211 by Sonia Centeno RN  Outcome: Ongoing  Goal: Control of chronic pain  Description: Control of chronic pain  11/30/2021 2211 by Sonia Centeno RN  Outcome: Ongoing  Goal: Patient's pain/discomfort is manageable  Description: Patient's pain/discomfort is manageable  11/30/2021 2211 by Sonia Centeno RN  Outcome: Ongoing    Problem: Infection:  Goal: Will remain free from infection  Description: Will remain free from infection  11/30/2021 2211 by Sonia Centeno RN  Outcome: Ongoing    Problem: Safety:  Goal: Free from accidental physical injury  Description: Free from accidental physical injury  11/30/2021 2211 by Sonia Centeno RN  Outcome: Ongoing  Goal: Free from intentional harm  Description: Free from intentional harm  11/30/2021 2211 by Sonia Centeno RN  Outcome: Ongoing    Problem: Daily Care:  Goal: Daily care needs are met  Description: Daily care needs are met  11/30/2021 2211 by Sonia Centeno RN  Outcome: Ongoing    Problem: Skin Integrity:  Goal: Skin integrity will stabilize  Description: Skin integrity will stabilize  11/30/2021 2211 by Sonia Centeno RN  Outcome: Ongoing    Problem: Discharge Planning:  Goal: Patients continuum of care needs are met  Description: Patients continuum of care needs are met  11/30/2021 2211 by Sonia Centeno RN  Outcome: Ongoing

## 2021-12-01 NOTE — PROGRESS NOTES
Department of Podiatry Progress Note  Jovan Avelar DPM Attending       Reason for Consult:  Wound of RIGHT anterior leg with cellulitis  Requesting Physician:  Linus Lai MD    CHIEF COMPLAINT:  Pain of wound to anterior RIGHT leg    HISTORY OF PRESENT ILLNESS:                The patient is a 73 y.o. female with significant past medical history of COPD, Hepatitis and Covid 19 twice who is consulted for a 7-10 day history of progressively worsening wound with pain to anterior RIGHT leg. She relates is started as a skin tear and became a much larger wound with increased pain / sensitivitty. She tried home care and antibiotics, without improvement. Concur with discharge planning, patient is happy to be going home today, and understands her follow up appointment with me next week    Allergies:   Atarax [hydroxyzine], Codeine, Sulfa antibiotics, and Zithromax [azithromycin]    REVIEW OF SYSTEMS:    CONSTITUTIONAL:  negative  INTEGUMENT/BREAST:  positive for skin lesion(s), skin color change and changes in lesion  MUSCULOSKELETAL:  positive for  pain and decreased range of motion  NEUROLOGICAL:  positive for gait problems  PHYSICAL EXAM:      Vitals:    /73   Pulse 66   Temp 97.9 °F (36.6 °C) (Oral)   Resp 18   Ht 5' 1\" (1.549 m)   Wt 202 lb 2.6 oz (91.7 kg)   LMP  (LMP Unknown)   SpO2 92%   BMI 38.20 kg/m²     LABS:   Recent Labs     11/30/21  0825 12/01/21  0835   WBC 9.1 7.7   HGB 14.1 14.1   HCT 43.7 43.3    223     Recent Labs     12/01/21  0835      K 4.1      CO2 24   BUN 15   CREATININE 0.6     No results for input(s): PROT, INR, APTT in the last 72 hours. LOWER EXTREMITY EXAMINATION   SKIN:    RIGHT anterior leg wound of 8.1 cm x 6.9 cm with depth of ragged sub Q 0.25 ml with weeping serous drainage as expected with use of Santyl. MUCH nicer appearance of skin color with absence of erythema after course of antibiotics.             NEUROLOGIC:    Pain sensation is not significantly changed Right. Light touch is increasedRight. positive history of paresthesia Right. negativehistory of burning Bilateral. Deep tendon reflexes are 2 to include patellar and achilles Right. Mentor--Jeremy 5.07 monofilament sensitivity is abnormal 2 to 5 spots tested Right. VASCULAR:    bilaterally Dorsalis pedis is 1. bilaterally Posterior tibial pulse is 1. 2+ edema right. mild Varicosities bilaterally. MUSCULOSKELETAL:  Burna is antalgic due to wound. Muscle strength is 4/5 to all groups tested to include dorsiflexion, plantarflexion, inversion, eversion, and digital Bilateral . The ranges of motion of all joints tested from ankle distal is decreased there is no crepitus noted Bilateral.      IMPRESSION/RECOMMENDATIONS    1. MUGS 2 wound of RIGHT anterior leg demonstrating improved cellulitis without fluctuance of abscess    2. Cellulitis RIGHT leg MUCH improved  Afebrile, absence of leukocytosis  Maxipime / Vanc    Wound cultured   Component 11/29/21 1336   WOUND/ABSCESS No growth to date P    Gram Stain Result 2+ WBC's (Polymorphonuclear)   No organisms seen       Disposition: Will continue to follow as out-patient for wound care needs    Thank you for the opportunity to take part in the patient's care.     Laura Varela DPM  [facility]  [phone]

## 2021-12-01 NOTE — PROGRESS NOTES
Clinical Pharmacy Note  Vancomycin Consult    Shweta Desai is a 73 y.o. female ordered Vancomycin for cellulitis; consult received from Dr. Sukhi Salinas to manage therapy. Also receiving cefepime. Patient Active Problem List   Diagnosis    Obesity    Tobacco abuse    Allergic rhinitis    Graves disease    COPD (chronic obstructive pulmonary disease) ([facility] [address]    Diverticulosis    Hypercholesteremia    Adrenal adenoma    Osteopenia    Intervertebral disc prolapse    OA (osteoarthritis of the spine)    Cervical disc disorder with radiculopathy    Essential hypertension    Abnormal CT scan, chest    PAC (premature atrial contraction)    SV[facility]ra[facility] tachycardia) ([facility])    Cellulitis of leg, right       Allergies:  Atarax [hydroxyzine], Codeine, Sulfa antibiotics, and Zithromax [azithromycin]     Temp max:  Temp (24hrs), Av.1 °F (36.7 °C), Min:98 °F (36.7 °C), Max:98.2 °F (36.8 °C)      Recent Labs     21  1142 21  0825   WBC 9.3 9.1       Recent Labs     21  1142 21  0825   BUN 15 13   CREATININE 0.6 0.6         Intake/Output Summary (Last 24 hours) at 2021 0106  Last data filed at 2021 1444  Gross per 24 hour   Intake 602.74 ml   Output --   Net 602.74 ml       Culture Results:  Pending    Ht Readings from Last 1 Encounters:   21 5' 1\" (1.549 m)        Wt Readings from Last 1 Encounters:   21 203 lb 11.3 oz (92.4 kg)         Estimated Creatinine Clearance: 92 mL/min (based on SCr of 0.6 mg/dL). Assessment/Plan:  Trough level 10.8 tonight  Will continue vancomycin 1000 mg IV every 12 hours     Trough level ordered for 1300 on 21. Thank you for the consult.    Get Castillo, PharmD

## 2021-12-02 ENCOUNTER — CARE COORDINATION (OUTPATIENT)
Dept: CASE MANAGEMENT | Age: 71
End: 2021-12-02

## 2021-12-02 DIAGNOSIS — L03.115 CELLULITIS OF LEG, RIGHT: Primary | ICD-10-CM

## 2021-12-02 LAB
GRAM STAIN RESULT: NORMAL
WOUND/ABSCESS: NORMAL

## 2021-12-02 PROCEDURE — 1111F DSCHRG MED/CURRENT MED MERGE: CPT

## 2021-12-02 RX ORDER — POLYMYXIN B SULFATE AND TRIMETHOPRIM 1; 10000 MG/ML; [USP'U]/ML
1 SOLUTION OPHTHALMIC 3 TIMES DAILY
COMMUNITY

## 2021-12-02 NOTE — CARE COORDINATION
[facility] Transitions Initial Follow Up Call    Call within 2 business days of discharge: Yes    Patient: Adriana Dumont Patient : 1950   MRN: [id number]  Reason for Admission: Cellulitis of right lower extremity  Discharge Date: 21 RARS: Readmission Risk Score: 10.6 ( )      Last Discharge [facility]       Complaint Diagnosis Description Type Department Provider    21 Cellulitis Cellulitis of right lower extremity ED to Hosp-Admission (Discharged) (ADMITTED) Tabitha Mckenzie MD; [address] [facility] Transitions of Care Initial Call    Was this an external facility discharge? No Discharge Facility: N/A    Challenges to be reviewed by the provider   Additional needs identified to be addressed with provider: No               Method of communication with provider : none      Advance Care Planning   Healthcare Decision Maker:    Primary Decision MakerPveniceyoanna Thorne Child - [phone]    Secondary Decision Maker: Bigg Starr Child - [id number] [facility] she does not have a Living Will or Healthcare POA. She will discuss these documents with her PCP at her follow up appointment. Was this a readmission? No  Patient stated reason for admission: wound on right lower leg    Care Transition Nurse (CTN) contacted the patient by telephone to perform post hospital discharge assessment. Verified name and  with patient as identifiers. Provided introduction to self, and explanation of the CTN role. CTN reviewed discharge instructions, medical action plan and red flags with patient who verbalized understanding. Patient given an opportunity to ask questions and does not have any further questions or concerns at this time. Were discharge instructions available to patient? Yes.  Reviewed appropriate site of care based on symptoms and resources available to patient including: PCP and Specialist. The patient agrees to contact the PCP office for questions related to their healthcare. Medication reconciliation was performed with patient, who verbalizes understanding of administration of home medications. .     Discussed COVID vaccination status: Yes. Jonathan Bosch states she received both doses of the [address] vaccine. CTN provided contact information. Non-face-to-face services provided:  Obtained and reviewed discharge summary and/or continuity of care documents  Assessment and support for treatment adherence and medication management-medication list reviewed & 1111f completed. Care Transitions 24 Hour Call    Do you have any ongoing symptoms?: No  Do you have a copy of your discharge instructions?: Yes  Do you have all of your prescriptions and are they filled?: Yes  Have you been contacted by a [address]?: No  Have you scheduled your follow up appointment?: Yes  How are you going to get to your appointment?: Car - drive self  Were you discharged with any Home Care or Post Acute Services: No  Do you feel like you have everything you need to keep you well at home?: Yes  Care Transitions Interventions       Initial attempt at CT discharge phone call. Jonathan Bosch states she is doing \"okay\". She declined assistance in scheduling a hospital follow up with her PCP. She states she will do that on her own. Jonathan Bosch states she is caring for her wound at home on her own as directed per . Jonathan Bosch states she is cleansing the wound - putting on ointment as directed - then a dressing. She states she is doing this daily. Jonathan Bosch states she has a follow up appointment with  on 12/07/2021. Jonathan Bosch states she has a hand held nebulizer at home. She states she uses this prn. She states she has not used it since she returned home. Jonathan Bosch states she feels her wound is doing better. She denies any fever. Fatuma rates her pain @ 3/10. She denies any needs at this time. Jonathan Bosch states her only remaining COVID issue is the cough. Follow Up  No future appointments.     "[employer]

## 2021-12-08 DIAGNOSIS — F51.01 PRIMARY INSOMNIA: ICD-10-CM

## 2021-12-08 RX ORDER — ZOLPIDEM TARTRATE 10 MG/1
10 TABLET ORAL NIGHTLY PRN
Qty: 90 TABLET | Refills: 0 | Status: SHIPPED | OUTPATIENT
Start: 2021-12-08 | End: 2022-03-03 | Stop reason: SDUPTHER

## 2021-12-08 NOTE — TELEPHONE ENCOUNTER
----- Message from April Clay sent at 12/8/2021 11:32 AM EST -----  Subject: Refill Request    QUESTIONS  Name of Medication? zolpidem (AMBIEN) 10 MG tablet  Patient-reported dosage and instructions? 1 tab @ night   How many days do you have left? 7  [facility]erred [facility]? [facility]  Pharmacy phone number (if available)? [phone]  ---------------------------------------------------------------------------  --------------  Skye MERCHANT  What is the best way for the office to contact you? OK to leave message on   voicemail  Preferred Call Back Phone Number?  [phone]

## 2021-12-14 ENCOUNTER — CARE COORDINATION (OUTPATIENT)
Dept: CASE MANAGEMENT | Age: 71
End: 2021-12-14

## 2021-12-14 NOTE — CARE COORDINATION
[facility] Transitions Follow Up Call    2021    Patient: Landa Mcburney  Patient : 1950   MRN: [id number]  Reason for Admission: Cellulitis of right lower extremity  Discharge Date: 21 RARS: Readmission Risk Score: 10.6 ( )         Spoke with: Fatuma Spencer - patient    Care Transitions Subsequent and Final Call    Subsequent and Final Calls  Care Transitions Interventions  Other Interventions:         Spoke with Nelly Bledsoe. She states she is doing \"good\". Writer asked about follow up with PCP. She declined assistance discussed need for appointment for refills. Call was disconnected. Writer placed 2nd call to Nelly W Tammi Bledsoe. Unable to reach patient. Left message. Contact info provided. Requested return call to [facility]. Received return call from Nelly Bledsoe. She states she has scheduled a follow up with the NP at her PCP office. The appointment is for this Friday. [address] states her right leg wound is getting better. She states she is continuing the daily dressing changes with a cleansing and ointment. She states she sees  next week. She rates her pain at 3/10. Nelly Bledsoe denies any fever. She denies any issues with her breathing - no use of her hand held nebulizer since she returned home. Nelly Bledsoe denies any needs at this time.     Follow Up  Future Appointments   Date Time Provider Cricket Garnica   2021  2:00 PM CHOLO Sorensen - NP [address] RN BSN  Care Transition Nurse  [phone]

## 2021-12-17 ENCOUNTER — OFFICE VISIT (OUTPATIENT)
Dept: FAMILY MEDICINE CLINIC | Age: 71
End: 2021-12-17
Payer: MEDICARE

## 2021-12-17 VITALS
TEMPERATURE: 97.7 F | BODY MASS INDEX: 38.14 KG/M2 | HEART RATE: 82 BPM | OXYGEN SATURATION: 93 % | DIASTOLIC BLOOD PRESSURE: 60 MMHG | HEIGHT: 61 IN | WEIGHT: 202 LBS | SYSTOLIC BLOOD PRESSURE: 110 MMHG

## 2021-12-17 DIAGNOSIS — Z95.810 DUAL ICD (IMPLANTABLE CARDIOVERTER-DEFIBRILLATOR) IN PLACE: ICD-10-CM

## 2021-12-17 DIAGNOSIS — I46.9 CARDIAC ARREST (HCC): ICD-10-CM

## 2021-12-17 DIAGNOSIS — L03.115 CELLULITIS OF RIGHT LOWER EXTREMITY: Primary | ICD-10-CM

## 2021-12-17 PROBLEM — I47.20 VT (VENTRICULAR TACHYCARDIA): Status: ACTIVE | Noted: 2021-09-30

## 2021-12-17 PROBLEM — R07.9 CHEST PAIN: Status: ACTIVE | Noted: 2021-09-30

## 2021-12-17 PROBLEM — H15.001 SCLERITIS OF RIGHT EYE: Status: ACTIVE | Noted: 2021-11-17

## 2021-12-17 PROBLEM — D86.9 SARCOIDOSIS: Status: ACTIVE | Noted: 2021-11-17

## 2021-12-17 PROBLEM — U07.1 COVID-19: Status: ACTIVE | Noted: 2021-09-29

## 2021-12-17 PROBLEM — H57.89 OCULAR INFLAMMATION: Status: ACTIVE | Noted: 2021-11-17

## 2021-12-17 PROCEDURE — 1111F DSCHRG MED/CURRENT MED MERGE: CPT | Performed by: NURSE PRACTITIONER

## 2021-12-17 PROCEDURE — 1036F TOBACCO NON-USER: CPT | Performed by: NURSE PRACTITIONER

## 2021-12-17 PROCEDURE — G8399 PT W/DXA RESULTS DOCUMENT: HCPCS | Performed by: NURSE PRACTITIONER

## 2021-12-17 PROCEDURE — 99213 OFFICE O/P EST LOW 20 MIN: CPT | Performed by: NURSE PRACTITIONER

## 2021-12-17 PROCEDURE — G8484 FLU IMMUNIZE NO ADMIN: HCPCS | Performed by: NURSE PRACTITIONER

## 2021-12-17 PROCEDURE — 4040F PNEUMOC VAC/ADMIN/RCVD: CPT | Performed by: NURSE PRACTITIONER

## 2021-12-17 PROCEDURE — G8427 DOCREV CUR MEDS BY ELIG CLIN: HCPCS | Performed by: NURSE PRACTITIONER

## 2021-12-17 PROCEDURE — 1123F ACP DISCUSS/DSCN MKR DOCD: CPT | Performed by: NURSE PRACTITIONER

## 2021-12-17 PROCEDURE — G8417 CALC BMI ABV UP PARAM F/U: HCPCS | Performed by: NURSE PRACTITIONER

## 2021-12-17 PROCEDURE — 3017F COLORECTAL CA SCREEN DOC REV: CPT | Performed by: NURSE PRACTITIONER

## 2021-12-17 PROCEDURE — 1090F PRES/ABSN URINE INCON ASSESS: CPT | Performed by: NURSE PRACTITIONER

## 2021-12-17 RX ORDER — PREDNISONE 10 MG/1
10 TABLET ORAL DAILY
Qty: 90 TABLET | Refills: 0 | Status: SHIPPED
Start: 2021-12-17 | End: 2022-03-31

## 2021-12-17 ASSESSMENT — ENCOUNTER SYMPTOMS
SHORTNESS OF BREATH: 1
WHEEZING: 0
ABDOMINAL PAIN: 0
DIARRHEA: 0
COUGH: 1
CONSTIPATION: 0
NAUSEA: 0
VOMITING: 0

## 2021-12-17 NOTE — PATIENT INSTRUCTIONS
Patient Education        Cellulitis: Care Instructions  Your Care Instructions     Cellulitis is a skin infection caused by bacteria, most often strep or staph. It often occurs after a break in the skin from a scrape, cut, bite, or puncture, or after a rash. Cellulitis may be treated without doing tests to find out what caused it. But your doctor may do tests, if needed, to look for a specific bacteria, like methicillin-resistant Staphylococcus aureus (MRSA). The doctor has checked you carefully, but problems can develop later. If you notice any problems or new symptoms, get medical treatment right away. Follow-up care is a key part of your treatment and safety. Be sure to make and go to all appointments, and call your doctor if you are having problems. It's also a good idea to know your test results and keep a list of the medicines you take. How can you care for yourself at home? · Take your antibiotics as directed. Do not stop taking them just because you feel better. You need to take the full course of antibiotics. · Prop up the infected area on pillows to reduce pain and swelling. Try to keep the area above the level of your heart as often as you can. · If your doctor told you how to care for your wound, follow your doctor's instructions. If you did not get instructions, follow this general advice:  ? Wash the wound with clean water 2 times a day. Don't use hydrogen peroxide or alcohol, which can slow healing. ? You may cover the wound with a thin layer of petroleum jelly, such as Vaseline, and a nonstick bandage. ? Apply more petroleum jelly and replace the bandage as needed. · Be safe with medicines. Take pain medicines exactly as directed. ? If the doctor gave you a prescription medicine for pain, take it as prescribed. ? If you are not taking a prescription pain medicine, ask your doctor if you can take an over-the-counter medicine.   To prevent cellulitis in the future  · Try to prevent cuts, scrapes, or other injuries to your skin. Cellulitis most often occurs where there is a break in the skin. · If you get a scrape, cut, mild burn, or bite, wash the wound with clean water as soon as you can to help avoid infection. Don't use hydrogen peroxide or alcohol, which can slow healing. · If you have swelling in your legs (edema), support stockings and good skin care may help prevent leg sores and cellulitis. · Take care of your feet, especially if you have diabetes or other conditions that increase the risk of infection. Wear shoes and socks. Do not go barefoot. If you have athlete's foot or other skin problems on your feet, talk to your doctor about how to treat them. When should you call for help? Call your doctor now or seek immediate medical care if:    · You have signs that your infection is getting worse, such as:  ? Increased pain, swelling, warmth, or redness. ? Red streaks leading from the area. ? Pus draining from the area. ? A fever.     · You get a rash. Watch closely for changes in your health, and be sure to contact your doctor if:    · You do not get better as expected. Where can you learn more? Go to https://[employer]. org and sign in to your [employer] account. Enter J645 in the [facility] box to learn more about \"Cellulitis: Care Instructions. \"     If you do not have an account, please click on the \"Sign Up Now\" link. Current as of: March 3, 2021               Content Version: 13.0  © 2006-2021 Healthwise, Incorporated. Care instructions adapted under license by [facility] ([facility]). If you have questions about a medical condition or this instruction, always ask your healthcare professional. [place] [address] any warranty or liability for your use of this information. Patient Education        Learning About ICD Shocks  What are ICDs and ICD shocks?      An implantable cardioverter-defibrillator (ICD) is a device that is placed under the skin of your chest. It has thin wires (called leads). Most of the time, these leads are placed inside the heart. Some ICDs have a lead that is placed under the skin so that it lies near your heart. The ICD is always checking your heart. If it detects a life-threatening rapid heart rhythm, it tries to slow the rhythm to get it back to normal. If the dangerous rhythm does not stop, the ICD sends an electric shock to the heart to restore a normal rhythm. The device then goes back to its watchful mode. The idea of living with an ICD and getting shocked worries some people. The shock can be uncomfortable. It may feel like you are being kicked in the chest. For many people, getting a shock can cause anxiety and depression. It's normal to be worried about living with an ICD. After all, you don't know when a shock might occur, and a shock could be a reminder that your heart is not as healthy as it could be. But an ICD is an important part of your treatment. It can save your life. If you take a few simple steps, you can feel better about having an ICD. How can you get over your fears about the ICD? Know your ICD treatment   · Learn how the ICD works, what it does, and how it keeps you safe. This can help reduce any anxiety you may feel. · Keep your regular doctor appointments. Your doctor:  ? Sets both the rate at which a shock will occur and the level of shock needed to restore your heart to a normal rate. ? Checks to see whether the ICD has given you any shocks since your last visit. This helps your doctor know if your medicines need to be adjusted. ? Checks the ICD battery and replaces it as needed. · Talk with others who have an ICD. Ask them if they have been shocked and what it was like. Ask them how they cope with it. Talking with others can help you feel better. · Always carry your ICD identity card, a list of all the medicines you are taking, and your doctor's name and phone number.  This will help you get the best possible treatment if you get a shock and need help. Make an action plan   Talk to your doctor about making an action plan for what to do if you get shocked. Here is an example:  · After one shock:  ? Call 911 or other emergency services right away if you feel bad or have symptoms like chest pain. ? Call your doctor soon if you feel fine right away after the shock. Your doctor may want to talk about the shock and schedule a follow-up visit. · If you get a second shock in a 24-hour period, call your doctor right away. Call even if you feel fine right away. Stay calm after a shock   · Follow the action plan you made with your doctor. · Do some breathing exercises. They may help you relax. ? Sit or lie in a comfortable position. Put one hand on your belly just below your ribs and the other hand on your chest.  ? Take a deep breath in through your nose, and let your belly push your hand out. Your chest should not move. ? Breathe out through pursed lips as if you were whistling. Feel the hand on your belly go in, and use it to push all the air out. ? Breathe in and out like this until you feel more relaxed. · Keep a good attitude. When you've had a shock, you may question how healthy you are or worry about getting another shock. But try to focus on the positive things in your life, like loving relationships, pleasant activities, or good friends. · Don't make changes in what you do. You may want to avoid an action because you think it caused the shock. But a shock can occur at any time, and you can't prevent shocks by your actions alone. Don't stop doing things you enjoy to try to avoid a shock. Follow-up care is a key part of your treatment and safety. Be sure to make and go to all appointments, and call your doctor if you are having problems. It's also a good idea to know your test results and keep a list of the medicines you take. Where can you learn more?   Go to https://chpepiceweb.[employer]. org and sign in to your [employer] account. Enter P734 in the [facility] box to learn more about \"Learning About ICD Shocks. \"     If you do not have an account, please click on the \"Sign Up Now\" link. Current as of: April 29, 2021               Content Version: 13.0  © 9997-9289 Healthwise, [facility]. Care instructions adapted under license by [facility] ([facility]). If you have questions about a medical condition or this instruction, always ask your healthcare professional. [address] any warranty or liability for your use of this information.

## 2021-12-17 NOTE — PROGRESS NOTES
Post-Discharge Transitional Care Management Services or Hospital Follow Up      Fatuma Spencer   YOB: 1950    Date of Office Visit:  12/17/2021  Date of Hospital Admission: 11/27/21  Date of Hospital Discharge: 12/1/21  Readmission Risk Score(high >=14%.  Medium >=10%):Readmission Risk Score: 10.6 ( )      Care management risk score Rising risk (score 2-5) and Complex Care (Scores >=6): 2     Non face to face  following discharge, date last encounter closed (first attempt may have been earlier): *No documented post hospital discharge outreach found in the last 14 days *No documented post hospital discharge outreach found in the last 14 days    Call initiated 2 business days of discharge: *No response recorded in the last 14 days     Patient Active Problem List   Diagnosis    Obesity    Tobacco abuse    Allergic rhinitis    Graves disease    COPD (chronic obstructive pulmonary disease) ([address]    Diverticulosis    Hypercholesteremia    Adrenal adenoma    Osteopenia    Intervertebral disc prolapse    OA (osteoarthritis of the spine)    Cervical disc disorder with radiculopathy    Essential hypertension    Abnormal CT scan, chest    PAC (premature atrial contraction)    SVT (supraventricular tachycardia) ([address]    Cardiac arrest ([address]    Chest pain    COVID-19    Dual ICD (implantable cardioverter-defibrillator) in place    Filamentary keratitis    Keratoconjunctivitis sicca of both eyes ([address]    Ocular inflammation    Sarcoidosis    Scleritis of right eye    Tear film insufficiency    VT (ventricular tachycardia) (HCC)       Allergies   Allergen Reactions    Atarax [Hydroxyzine] Hives    Codeine Other (See Comments)     Makes her feel hyper    Sulfa Antibiotics Nausea Only    Zithromax [Azithromycin]      Itching and burning       Medications listed as ordered at the time of discharge from hospital     Medication List          Accurate as of December 17, 2021  2:42 PM. If you have any questions, ask your nurse or doctor. CHANGE how you take these medications    predniSONE 10 MG tablet  Commonly known as: DELTASONE  Take 1 tablet by mouth daily  What changed: how much to take  Changed by: CHOLO Boyd NP        CONTINUE taking these medications    albuterol 90 MCG/ACT inhaler  Commonly known as: PROVENTIL;VENTOLIN     alendronate 70 MG tablet  Commonly known as: FOSAMAX     aspirin EC 81 MG EC tablet  Take 1 tablet by mouth daily     CENTRUM ADULTS PO     dilTIAZem 180 MG extended release capsule  Commonly known as: DILACOR XR     DULoxetine 60 MG extended release capsule  Commonly known as: CYMBALTA  Take 1 capsule by mouth daily     fluticasone-salmeterol 500-50 MCG/DOSE diskus inhaler  Commonly known as: ADVAIR     folic acid 1 MG tablet  Commonly known as: FOLVITE     furosemide 20 MG tablet  Commonly known as: LASIX     ipratropium-albuterol 0.5-2.5 (3) MG/3ML Soln nebulizer solution  Commonly known as: DUONEB     levothyroxine 88 MCG tablet  Commonly known as: SYNTHROID  TAKE 1 TABLET EVERY DAY     meclizine 12.5 MG tablet  Commonly known as: ANTIVERT     methotrexate 2.5 MG chemo tablet  Commonly known as: RHEUMATREX     metoprolol tartrate 25 MG tablet  Commonly known as: LOPRESSOR     Nebulizer/Tubing/Mouthpiece Kit  Use Daily Every 4 Hours As Needed DX J44.1     omeprazole 20 MG delayed release capsule  Commonly known as: PRILOSEC     Restasis 0.05 % ophthalmic emulsion  Generic drug: cycloSPORINE     Roflumilast 500 MCG tablet  Commonly known as: DALIRESP  Take 500 mcg by mouth daily     SINGULAIR PO     tiotropium 2.5 MCG/ACT Aers inhaler  Commonly known as: SPIRIVA RESPIMAT     trimethoprim-polymyxin b 73873-6.1 UNIT/ML-% ophthalmic solution  Commonly known as: POLYTRIM     zolpidem 10 MG tablet  Commonly known as: AMBIEN  Take 1 tablet by mouth nightly as needed for Sleep for up to 90 days.         STOP taking these medications    citalopram 20 MG tablet  Commonly known as: CELEXA  Stopped by: CHOLO Tse NP     clindamycin 300 MG capsule  Commonly known as: CLEOCIN  Stopped by: CHOLO Tse NP     doxycycline monohydrate 100 MG capsule  Commonly known as: Chau Wongs by: CHOLO Tse NP     mupirocin 2 % ointment  Commonly known as: Brett Sarbjit by: CHOLO Tse NP     nitroGLYCERIN 0.4 MG SL tablet  Commonly known as: Nitrostat  Stopped by: CHOLO Tse NP           Where to Get Your Medications      Information about where to get these medications is not yet available    Ask your nurse or doctor about these medications  · predniSONE 10 MG tablet           Medications marked \"taking\" at this time  Outpatient Medications Marked as Taking for the 12/17/21 encounter (Office Visit) with CHOLO Tse NP   Medication Sig Dispense Refill    predniSONE (DELTASONE) 10 MG tablet Take 1 tablet by mouth daily 90 tablet 0    zolpidem (AMBIEN) 10 MG tablet Take 1 tablet by mouth nightly as needed for Sleep for up to 90 days.  90 tablet 0    trimethoprim-polymyxin b (POLYTRIM) 40464-2.1 UNIT/ML-% ophthalmic solution Place 1 drop into the right eye 3 times daily      dilTIAZem (DILACOR XR) 180 MG extended release capsule Take 180 mg by mouth daily      fluticasone-salmeterol (ADVAIR) 500-50 MCG/DOSE diskus inhaler Inhale 1 puff into the lungs 2 times daily      alendronate (FOSAMAX) 70 MG tablet Take 70 mg by mouth every 7 days On saturday      meclizine (ANTIVERT) 12.5 MG tablet Take 12.5 mg by mouth every morning      omeprazole (PRILOSEC) 20 MG delayed release capsule Take 20 mg by mouth daily      Respiratory Therapy Supplies (NEBULIZER/TUBING/MOUTHPIECE) KIT Use Daily Every 4 Hours As Needed DX J44.1 1 kit 0    methotrexate (RHEUMATREX) 2.5 MG chemo tablet Take 25 mg by mouth once a week On wednesday      furosemide (LASIX) 20 MG tablet Take 20 mg by mouth daily       folic acid (FOLVITE) 1 MG tablet Take 1 mg by mouth daily      DULoxetine (CYMBALTA) 60 MG extended release capsule Take 1 capsule by mouth daily 30 capsule 5    levothyroxine (SYNTHROID) 88 MCG tablet TAKE 1 TABLET EVERY DAY 90 tablet 1    aspirin EC 81 MG EC tablet Take 1 tablet by mouth daily 30 tablet 0    RESTASIS 0.05 % ophthalmic emulsion Place 1 drop into both eyes 2 times daily       Multiple Vitamins-Minerals (CENTRUM ADULTS PO) Take by mouth daily      tiotropium (SPIRIVA RESPIMAT) 2.5 MCG/ACT AERS inhaler Inhale 2.5 mcg into the lungs daily       Roflumilast (DALIRESP) 500 MCG tablet Take 500 mcg by mouth daily 30 tablet 3    albuterol (PROVENTIL;VENTOLIN) 90 MCG/ACT inhaler Inhale 2 puffs into the lungs every 6 hours as needed.  ipratropium-albuterol (DUONEB) 0.5-2.5 (3) MG/3ML SOLN nebulizer solution Inhale 1 vial into the lungs every 4 hours.  Montelukast Sodium (SINGULAIR PO) Take 10 mg by mouth nightly           Medications patient taking as of now reconciled against medications ordered at time of hospital discharge: Yes    Chief Complaint   Patient presents with    Cellulitis     right lower leg       HPI    Inpatient course: Discharge summary reviewed- see chart. Interval history/Current status: was admitted for 5 days for right leg cellulitis. Received IV antibiotics. Was sent home with 2 oral antibiotics, which she completed. Wound care consist of daily antibacterial wash and mediHoney with a dressing. Pt taking care of wound care and tolerating well. Ongoing shooting pain intermittent, podiatrist told her is was because the wound is down to the nerves. Denies pain in the calf or behind the knee, denies increased sob. Ongoing rediness, swelling to the RLL. Review of Systems   Constitutional: Negative for activity change, fatigue and fever. Respiratory: Positive for cough and shortness of breath (chronic with COPD). Negative for wheezing.     Cardiovascular: Positive for leg swelling. Negative for chest pain and palpitations. Gastrointestinal: Negative for abdominal pain, constipation, diarrhea, nausea and vomiting. Genitourinary: Negative for difficulty urinating and dysuria. Musculoskeletal: Positive for arthralgias (right lower leg). Skin: Positive for wound (right shin). Neurological: Negative for dizziness, weakness, light-headedness and headaches. Hematological: Bruises/bleeds easily (long term steroid use). Psychiatric/Behavioral: Negative for sleep disturbance. Vitals:    12/17/21 1359   BP: 110/60   Site: Left Upper Arm   Position: Sitting   Cuff Size: Medium Adult   Pulse: 82   Temp: 97.7 °F (36.5 °C)   TempSrc: Oral   SpO2: 93%   Weight: 202 lb (91.6 kg)   Height: 5' 1\" (1.549 m)     Body mass index is 38.17 kg/m². Wt Readings from Last 3 Encounters:   12/17/21 202 lb (91.6 kg)   12/01/21 202 lb 2.6 oz (91.7 kg)   10/13/21 205 lb (93 kg)     BP Readings from Last 3 Encounters:   12/17/21 110/60   12/01/21 120/73   10/13/21 128/78       Physical Exam  Constitutional:       General: She is not in acute distress. Appearance: She is not ill-appearing. HENT:      Head: Normocephalic and atraumatic. Mouth/Throat:      Mouth: Mucous membranes are moist.      Pharynx: Oropharynx is clear. Eyes:      Conjunctiva/sclera: Conjunctivae normal.      Pupils: Pupils are equal, round, and reactive to light. Neck:      Vascular: No carotid bruit. Cardiovascular:      Rate and Rhythm: Normal rate and regular rhythm. Pulses: Normal pulses. Heart sounds: Normal heart sounds. Pulmonary:      Breath sounds: Examination of the right-upper field reveals wheezing. Examination of the right-middle field reveals wheezing. Wheezing (clears with coughing) present. Musculoskeletal:      Cervical back: No tenderness. Right lower leg: Edema present. Left lower leg: Edema present. Lymphadenopathy:      Cervical: No cervical adenopathy. Skin:     Capillary Refill: Capillary refill takes less than 2 seconds. Findings: Lesion (superficial abrasion, 5cm x 5.5 cm, wound bed is pink, surround skin intact) present. Neurological:      Mental Status: She is alert. Assessment/Plan:  1. Cellulitis of right lower extremity  Improving, continue home treatments. 2. Dual ICD (implantable cardioverter-defibrillator) in place  Going to establish with dr. Byron Perry, following up with [address] [place] [address]. E cardiothoracic surgeon who placed the defib. 3. Cardiac arrest [facility])  Had cardiac cath, no blockages, cardio/defib placed. Continue to follow up with cardiology.          Medical Decision Making: moderate complexity

## 2021-12-22 ENCOUNTER — CARE COORDINATION (OUTPATIENT)
Dept: CASE MANAGEMENT | Age: 71
End: 2021-12-22

## 2021-12-22 NOTE — CARE COORDINATION
[facility] Transitions Follow Up Call    2021    Patient: Moreno Spencer  Patient : 1950   MRN: [id number]  Reason for Admission: cellulitis of right lower extremity  Discharge Date: 21 RARS: Readmission Risk Score: 10.6 ( )         Spoke with: no one    Care Transitions Subsequent and Final Call    Subsequent and Final Calls  Care Transitions Interventions  Other Interventions:         Unable to reach patient. Left message. Contact info provided. Requested return call to CTN. Follow Up  No future appointments.     Jane Mueller RN BSN  Care Transition Nurse  [phone]

## 2022-01-04 ENCOUNTER — TELEPHONE (OUTPATIENT)
Dept: FAMILY MEDICINE CLINIC | Age: 72
End: 2022-01-04

## 2022-01-04 RX ORDER — FUROSEMIDE 20 MG/1
20 TABLET ORAL DAILY
Qty: 60 TABLET | Refills: 0 | Status: SHIPPED | OUTPATIENT
Start: 2022-01-04 | End: 2022-01-31

## 2022-01-04 NOTE — TELEPHONE ENCOUNTER
Patient calling from Dr Tamiko Azar podiatrist office, he is asking if you would increase her lasix for maybe 4 days, she is currently on 20 mg . Her left leg is not as bad as the right is very swollen. Please send to [facility], she is all most out.       Please call, [phone]

## 2022-01-06 NOTE — DISCHARGE SUMMARY
Hospital Medicine Discharge Summary    Patient ID: Emmanuel Egan      Patient's PCP: Kaitlynn Wheeler MD    Admit Date: 11/27/2021     Discharge Date: 12/1/2021    Admitting Physician: Doris Raymundo MD     Discharge Physician: Lc Gill MD     Discharge Diagnoses: There are no active hospital problems to display for this patient. The patient was seen and examined on day of discharge and this discharge summary is in conjunction with any daily progress note from day of discharge. Condition at discharge - stable    Hospital Course: patient seen and evaluated on the day of discharge. Patient informed about following up with appointments. Patient verbalized understanding for follow-up appointments. The patient and / or the family were informed of the results of tests, a time was given to answer questions, a plan was proposed and they agreed with plan. Medical reconciliation performed. Patient discharged stable condition. Leg wound with cellulitis, right  COPD  Hx of Covid SVT and recent cardiac arrest (s/p AICD 10/2021)   Sarcoidosis, optho (on chronic prednisone)  Obesity   HTN     Infection improving. Given extent of infection, prefer additional day of IV abx. Can d/c on PO abx in AM.      PLAN:      - MRSA swab neg  - wound cx no growth to date  - continue IV cefepime / vanco, can d/c on Clinda & Keflex for 1 week (sulfa allergic)  - continue rest of home meds  - reordered abx eye drops  - podiatry following               Exam:     /73   Pulse 66   Temp 97.9 °F (36.6 °C) (Oral)   Resp 18   Ht 5' 1\" (1.549 m)   Wt 202 lb 2.6 oz (91.7 kg)   LMP  (LMP Unknown)   SpO2 92%   BMI 38.20 kg/m²     General appearance: No apparent distress  HEENT:  Conjunctivae/corneas clear. Neck: Supple, No jugular venous distention. Respiratory:  Normal respiratory effort. Clear to auscultation, bilaterally without Rales/Wheezes/Rhonchi.   Cardiovascular: Regular rate and rhythm with normal S1/S2 without murmurs, rubs or gallops. Abdomen: Soft, non-tender, non-distended, normal bowel sounds. Musculoskelatal: No clubbing, cyanosis or edema bilaterally. Skin: Skin color, texture, turgor normal.   Neurologic: no focal neurologic deficits. grossly non-focal.  Psychiatric: Alert and oriented, normal mood    Consults:     IP CONSULT TO PODIATRY  PHARMACY TO DOSE VANCOMYCIN      Code Status:  Prior    Activity: activity as tolerated    Labs:  For convenience and continuity at follow-up the following most recent labs are provided:      CBC:    Lab Results   Component Value Date    WBC 7.7 12/01/2021    HGB 14.1 12/01/2021    HCT 43.3 12/01/2021     12/01/2021       Renal:    Lab Results   Component Value Date     12/01/2021    K 4.1 12/01/2021    K 3.8 11/27/2021     12/01/2021    CO2 24 12/01/2021    BUN 15 12/01/2021    CREATININE 0.6 12/01/2021    CALCIUM 9.0 12/01/2021    PHOS 3.2 10/20/2012       Discharge Medications:     Discharge Medication List as of 12/1/2021  2:21 PM           Details   cephALEXin (KEFLEX) 500 MG capsule Take 1 capsule by mouth 3 times daily for 7 days, Disp-21 capsule, R-0Normal              Details   dilTIAZem (DILACOR XR) 180 MG extended release capsule Take 180 mg by mouth dailyHistorical Med      fluticasone-salmeterol (ADVAIR) 500-50 MCG/DOSE diskus inhaler Inhale 1 puff into the lungs 2 times dailyHistorical Med      alendronate (FOSAMAX) 70 MG tablet Take 70 mg by mouth every 7 days On saturdayHistorical Med      meclizine (ANTIVERT) 12.5 MG tablet Take 12.5 mg by mouth every morningHistorical Med      omeprazole (PRILOSEC) 20 MG delayed release capsule Take 20 mg by mouth dailyHistorical Med      citalopram (CELEXA) 20 MG tablet Take 20 mg by mouth dailyHistorical Med      clindamycin (CLEOCIN) 300 MG capsule Take 300 mg by mouth 3 times daily For 7 daysHistorical Med      doxycycline monohydrate (MONODOX) 100 MG capsule Take 100 mg by mouth 2 times dailyHistorical Med      mupirocin (BACTROBAN) 2 % ointment Apply topically 3 times daily Apply topically 3 times daily. , Topical, 3 TIMES DAILY, Historical Med      Respiratory Therapy Supplies (NEBULIZER/TUBING/MOUTHPIECE) KIT Disp-1 kit, R-0, PrintUse Daily Every 4 Hours As Needed DX J44.1      methotrexate (RHEUMATREX) 2.5 MG chemo tablet Take 25 mg by mouth once a week On wednesdayHistorical Med      folic acid (FOLVITE) 1 MG tablet Take 1 mg by mouth dailyHistorical Med      DULoxetine (CYMBALTA) 60 MG extended release capsule Take 1 capsule by mouth daily, Disp-30 capsule, R-5Normal      predniSONE (DELTASONE) 10 MG tablet Take 20 mg by mouth daily Historical Med      furosemide (LASIX) 20 MG tablet Take 20 mg by mouth daily Historical Med      zolpidem (AMBIEN) 10 MG tablet Take 1 tablet by mouth nightly as needed for Sleep for up to 90 days. , Disp-90 tablet, R-0Normal      levothyroxine (SYNTHROID) 88 MCG tablet TAKE 1 TABLET EVERY DAY, Disp-90 tablet, R-1Normal      aspirin EC 81 MG EC tablet Take 1 tablet by mouth daily, Disp-30 tablet,R-0OTC      metoprolol tartrate (LOPRESSOR) 25 MG tablet 25 mg 2 times daily Historical Med      RESTASIS 0.05 % ophthalmic emulsion Place 1 drop into both eyes 2 times daily , DAWHistorical Med      Multiple Vitamins-Minerals (CENTRUM ADULTS PO) Take by mouth dailyHistorical Med      nitroGLYCERIN (NITROSTAT) 0.4 MG SL tablet Place 1 tablet under the tongue every 5 minutes as needed for Chest pain up to max of 3 total doses. If no relief after 1 dose, call 911., Disp-25 tablet, R-3Phone In      tiotropium (SPIRIVA RESPIMAT) 2.5 MCG/ACT AERS inhaler Inhale 2.5 mcg into the lungs daily Historical Med      Roflumilast (DALIRESP) 500 MCG tablet Take 500 mcg by mouth daily, Disp-30 tablet, R-3Normal      albuterol (PROVENTIL;VENTOLIN) 90 MCG/ACT inhaler Inhale 2 puffs into the lungs every 6 hours as needed.         ipratropium-albuterol (DUONEB) 0.5-2.5 (3) MG/3ML SOLN

## 2022-01-07 ENCOUNTER — CARE COORDINATION (OUTPATIENT)
Dept: CASE MANAGEMENT | Age: 72
End: 2022-01-07

## 2022-01-07 NOTE — CARE COORDINATION
[facility] Transitions Follow Up Call    2022    Patient: Alisha Spencer  Patient : 1950   MRN: [id number]  Reason for Admission: Cellulitis of right lower extremity  Discharge Date: 21 RARS: Readmission Risk Score: 10.6 ( )         Care Transitions Follow Up Call    Needs to be reviewed by the provider   Additional needs identified to be addressed with provider: No               Method of communication with provider : none      Care Transition Nurse (CTN) contacted the patient by telephone to follow up after admission on 2021. Verified name and  with patient as identifiers. CTN provided contact information for future needs. Care Transitions Subsequent and Final Call    Subsequent and Final Calls  Do you have any ongoing symptoms?: No  Have your medications changed?: Yes  Do you have any questions related to your medications?: No  Do you currently have any active services?: No  Do you have any needs or concerns that I can assist you with?: No  Identified Barriers: None  Care Transitions Interventions  Other Interventions:         Final attempt at CT follow up phone call. [place] Alter states she is doing [place]" She states she continues to do her daily dressing changes and that the right leg wound is \"much better\". She rates her pain at 1-2/10. She denies any fever. She denies any needs at this time. Informed Fatuma this would be the final CTN outreach. Follow Up  No future appointments.     Danni Ellington RN BSN  Care Transition Nurse  [phone]

## 2022-01-14 ENCOUNTER — TELEPHONE (OUTPATIENT)
Dept: FAMILY MEDICINE CLINIC | Age: 72
End: 2022-01-14

## 2022-01-14 NOTE — TELEPHONE ENCOUNTER
Patient does not know what she has done, she has severe pain in lower back that goes around her hips to her stomach. She said the pain is 10 out of 10. She did not want appt, she said she can't even drive right now.    Any suggestions    Please call, [phone]

## 2022-01-14 NOTE — TELEPHONE ENCOUNTER
I would suggest and ED evaluation. Is she having other symptoms? Loss of bowel or bladder function, weakness or numbness in her legs?

## 2022-01-21 ENCOUNTER — TELEPHONE (OUTPATIENT)
Dept: FAMILY MEDICINE CLINIC | Age: 72
End: 2022-01-21

## 2022-01-21 NOTE — TELEPHONE ENCOUNTER
----- Message from Davion Pereyra sent at 1/21/2022  4:26 PM EST -----  Subject: Appointment Request    Reason for Call: Urgent (Patient Request) Hospital Follow Up    QUESTIONS  Type of Appointment? Established Patient  Reason for appointment request? No appointments available during search  Additional Information for Provider? Patient is calling to get an   appointment for an ER f/u for cellulitis- patient does not want to wait   until febuary   ---------------------------------------------------------------------------  --------------  [employer]  What is the best way for the office to contact you? OK to leave message on   voicemail  Preferred Call Back Phone Number? [phone]  ---------------------------------------------------------------------------  --------------  SCRIPT ANSWERS  Relationship to Patient? Self  (Patient requests to see provider urgently. )? Yes  (Has the patient been discharged from the hospital within 2 business days   AND does not have a Telephone Encounter  Follow Up From [address] [place] [address]   documented in [address]?)? Yes  Do you have any questions for your primary care provider that need to be   answered prior to your appointment? (Use RN Triage if question pertains to   anything on the red flag list)? No  (Patient needs follow up visit after hospital discharge) Book first   available appointment within 7 days OF DISCHARGE, if no appt, proceed to   book the next available time slot within 14 days OF DISCHARGE AND Send   Message to Provider. [facility] Follow Up appointment cannot be booked   beyond 14 Days and should result in a Message to Provider. ? Yes   Have you been diagnosed with, awaiting test results for, or told that you   are suspected of having COVID-19 (Coronavirus)? (If patient has tested   negative or was tested as a requirement for work, school, or travel and   not based on symptoms, answer no)?  No  Within the past two weeks have you developed any of the following symptoms (answer no if symptoms have been present longer than 2 weeks or began   more than 2 weeks ago)? Fever or Chills, Cough, Shortness of breath or   difficulty breathing, Loss of taste or smell, Sore throat, Nasal   congestion, Sneezing or runny nose, Fatigue or generalized body aches   (answer no if pain is specific to a body part e.g. back pain), Diarrhea,   Headache? No  Have you had close contact with someone with COVID-19 in the last 14 days? No  (Service Expert  click yes below to proceed with [employer] As Usual   Scheduling)?  Yes

## 2022-01-25 ENCOUNTER — TELEPHONE (OUTPATIENT)
Dept: FAMILY MEDICINE CLINIC | Age: 72
End: 2022-01-25

## 2022-01-27 ENCOUNTER — TELEPHONE (OUTPATIENT)
Dept: FAMILY MEDICINE CLINIC | Age: 72
End: 2022-01-27

## 2022-01-27 ENCOUNTER — OFFICE VISIT (OUTPATIENT)
Dept: FAMILY MEDICINE CLINIC | Age: 72
End: 2022-01-27
Payer: MEDICARE

## 2022-01-27 VITALS
SYSTOLIC BLOOD PRESSURE: 112 MMHG | WEIGHT: 194.8 LBS | HEIGHT: 61 IN | BODY MASS INDEX: 36.78 KG/M2 | DIASTOLIC BLOOD PRESSURE: 74 MMHG | HEART RATE: 73 BPM | OXYGEN SATURATION: 97 % | TEMPERATURE: 97.6 F

## 2022-01-27 DIAGNOSIS — L03.115 CELLULITIS OF RIGHT LOWER EXTREMITY: ICD-10-CM

## 2022-01-27 DIAGNOSIS — L88 PYODERMA GANGRENOSA: Primary | ICD-10-CM

## 2022-01-27 DIAGNOSIS — L08.9 WOUND INFECTION: ICD-10-CM

## 2022-01-27 DIAGNOSIS — T14.8XXA WOUND INFECTION: ICD-10-CM

## 2022-01-27 PROCEDURE — 1090F PRES/ABSN URINE INCON ASSESS: CPT | Performed by: INTERNAL MEDICINE

## 2022-01-27 PROCEDURE — G8427 DOCREV CUR MEDS BY ELIG CLIN: HCPCS | Performed by: INTERNAL MEDICINE

## 2022-01-27 PROCEDURE — 3017F COLORECTAL CA SCREEN DOC REV: CPT | Performed by: INTERNAL MEDICINE

## 2022-01-27 PROCEDURE — G8399 PT W/DXA RESULTS DOCUMENT: HCPCS | Performed by: INTERNAL MEDICINE

## 2022-01-27 PROCEDURE — 4040F PNEUMOC VAC/ADMIN/RCVD: CPT | Performed by: INTERNAL MEDICINE

## 2022-01-27 PROCEDURE — G8484 FLU IMMUNIZE NO ADMIN: HCPCS | Performed by: INTERNAL MEDICINE

## 2022-01-27 PROCEDURE — 99213 OFFICE O/P EST LOW 20 MIN: CPT | Performed by: INTERNAL MEDICINE

## 2022-01-27 PROCEDURE — 1036F TOBACCO NON-USER: CPT | Performed by: INTERNAL MEDICINE

## 2022-01-27 PROCEDURE — 1123F ACP DISCUSS/DSCN MKR DOCD: CPT | Performed by: INTERNAL MEDICINE

## 2022-01-27 PROCEDURE — G8417 CALC BMI ABV UP PARAM F/U: HCPCS | Performed by: INTERNAL MEDICINE

## 2022-01-27 RX ORDER — CEFDINIR 300 MG/1
300 CAPSULE ORAL 2 TIMES DAILY
COMMUNITY
End: 2022-01-31 | Stop reason: SDUPTHER

## 2022-01-27 RX ORDER — HYDROCODONE BITARTRATE AND ACETAMINOPHEN 5; 325 MG/1; MG/1
1 TABLET ORAL EVERY 8 HOURS PRN
Qty: 21 TABLET | Refills: 0 | Status: SHIPPED | OUTPATIENT
Start: 2022-01-27 | End: 2022-02-03

## 2022-01-27 RX ORDER — DOXYCYCLINE HYCLATE 100 MG/1
100 CAPSULE ORAL 2 TIMES DAILY
COMMUNITY
End: 2022-01-31 | Stop reason: SDUPTHER

## 2022-01-27 NOTE — PROGRESS NOTES
(NEBULIZER/TUBING/MOUTHPIECE) KIT Use Daily Every 4 Hours As Needed DX J44.1 1 kit 0    methotrexate (RHEUMATREX) 2.5 MG chemo tablet Take 25 mg by mouth once a week On wednesday      folic acid (FOLVITE) 1 MG tablet Take 1 mg by mouth daily      levothyroxine (SYNTHROID) 88 MCG tablet TAKE 1 TABLET EVERY DAY 90 tablet 1    aspirin EC 81 MG EC tablet Take 1 tablet by mouth daily 30 tablet 0    metoprolol tartrate (LOPRESSOR) 25 MG tablet 25 mg 2 times daily       RESTASIS 0.05 % ophthalmic emulsion Place 1 drop into both eyes 2 times daily       Multiple Vitamins-Minerals (CENTRUM ADULTS PO) Take by mouth daily      tiotropium (SPIRIVA RESPIMAT) 2.5 MCG/ACT AERS inhaler Inhale 2.5 mcg into the lungs daily       Roflumilast (DALIRESP) 500 MCG tablet Take 500 mcg by mouth daily 30 tablet 3    albuterol (PROVENTIL;VENTOLIN) 90 MCG/ACT inhaler Inhale 2 puffs into the lungs every 6 hours as needed.  ipratropium-albuterol (DUONEB) 0.5-2.5 (3) MG/3ML SOLN nebulizer solution Inhale 1 vial into the lungs every 4 hours.  Montelukast Sodium (SINGULAIR PO) Take 10 mg by mouth nightly       DULoxetine (CYMBALTA) 60 MG extended release capsule TAKE 1 CAPSULE EVERY DAY 90 capsule 1    furosemide (LASIX) 20 MG tablet Take 1 tablet by mouth once daily 60 tablet 0    doxycycline hyclate (VIBRA-TABS) 100 MG tablet Take 1 tablet by mouth 2 times daily for 10 days 20 tablet 0    dilTIAZem (DILACOR XR) 180 MG extended release capsule Take 180 mg by mouth daily       No current facility-administered medications for this visit. Social History     Socioeconomic History    Marital status:       Spouse name: Not on file    Number of children: Not on file    Years of education: Not on file    Highest education level: Not on file   Occupational History    Not on file   Tobacco Use    Smoking status: Former Smoker     Packs/day: 1.50     Years: 40.00     Pack years: 60.00     Quit date: 1/1/2015 Years since quittin.0    Smokeless tobacco: Never Used   Vaping Use    Vaping Use: Never used   Substance and Sexual Activity    Alcohol use: No    Drug use: No    Sexual activity: Yes     Partners: Male   Other Topics Concern    Not on file   Social History Narrative    Not on file     Social Determinants of Health     Financial Resource Strain: Low Risk     Difficulty of Paying Living Expenses: Not hard at all   Food Insecurity: No Food Insecurity    Worried About Running Out of Food in the Last Year: Never true    [address] in the Last Year: Never true   Transportation Needs: No Transportation Needs    Lack of Transportation (Medical): No    Lack of Transportation (Non-Medical):  No   Physical Activity:     Days of Exercise per Week: Not on file    Minutes of Exercise per Session: Not on file   Stress:     Feeling of Stress : Not on file   Social Connections:     Frequency of Communication with Friends and Family: Not on file    Frequency of Social Gatherings with Friends and Family: Not on file    Attends Jew Services: Not on file    Active Member of Clubs or Organizations: Not on file    Attends Club or Organization Meetings: Not on file    Marital Status: Not on file   Intimate Partner Violence:     Fear of Current or Ex-Partner: Not on file    Emotionally Abused: Not on file    Physically Abused: Not on file    Sexually Abused: Not on file   Housing Stability:     Unable to Pay for Housing in the Last Year: Not on file    Number of [place] in the Last Year: Not on file    Unstable Housing in the Last Year: Not on file      Past Medical History:   Diagnosis Date    Adrenal adenoma     left    Allergic rhinitis     Asthma     Cervical disc disorder with radiculopathy     [facility]) [facility]     Deviated nasal septum     s/p septoplasty    Diverticulosis     h/o abscess  s/p colon resection  Dr. Regis Barker Endocervical polyp 2/2006    s[place]am metoplasia    Endometrial polyps 2/06    s hyperplasia    Grave's disease     Graves disease 11/97    s/p SCHMITT now hypothroid    Headache(784.0)     Herniated discs 1997    l5-s1 with DDD    HIGH CHOLESTEROL 6/09    ldl    Hypertension     Hypothyroidism     OA (osteoarthritis of the spine)     anterior wedgiy    Obesity     Osteopenia 1/2004    spine    Pneumonia 2001    hospitalized    Postherpetic neuralgia 6/23/2014    Scleritis and episcleritis of right eye     Tear meniscus knee     right    Tobacco abuse     Ulcer gastric 1980    Urticaria      Past Surgical History:   Procedure Laterality Date    CARDIAC SURGERY      NASAL SEPTUM SURGERY      SUBTOTAL COLECTOMY      diverticular abscess     TUBAL LIGATION         Review of Systems   Constitutional: Positive for activity change and unexpected weight change (losing ). Negative for fatigue and fever. HENT: Negative for nosebleeds. Eyes: Positive for redness. Respiratory: Positive for shortness of breath (chronic with COPD). Negative for cough and wheezing. Cardiovascular: Positive for leg swelling. Negative for chest pain and palpitations. Gastrointestinal: Negative for abdominal pain, constipation, diarrhea, nausea and vomiting. Genitourinary: Negative for dysuria and vaginal discharge. Musculoskeletal: Positive for arthralgias (right lower leg) and myalgias. Skin: Positive for color change, rash and wound. Neurological: Negative for dizziness, weakness, light-headedness and headaches. Hematological: Bruises/bleeds easily (long term steroid use). Psychiatric/Behavioral: Negative for sleep disturbance. OBJECTIVE:  /74 (Site: Left Lower Arm, Position: Sitting, Cuff Size: Medium Adult)   Pulse 73   Temp 97.6 °F (36.4 °C) (Oral)   Ht 5' 1\" (1.549 m)   Wt 194 lb 12.8 oz (88.4 kg)   LMP  (LMP Unknown)   SpO2 97%   BMI 36.81 kg/m²      Body mass index is 36.81 kg/m². Physical Exam  Constitutional:       General: She is not in acute distress. Appearance: Normal appearance. She is obese. She is not ill-appearing. HENT:      Head: Normocephalic and atraumatic. Eyes:      Comments: Sclera red but better   Cardiovascular:      Rate and Rhythm: Normal rate and regular rhythm. Pulses: Normal pulses. Heart sounds: Normal heart sounds. Pulmonary:      Effort: Pulmonary effort is normal.      Breath sounds: No wheezing. Comments: Decreased bs  Abdominal:      General: There is no distension. Palpations: Abdomen is soft. Tenderness: There is no abdominal tenderness. Musculoskeletal:      Cervical back: Neck supple. Right lower leg: Edema (worse ) present. Left lower leg: Edema present. Lymphadenopathy:      Cervical: No cervical adenopathy. Skin:     Findings: Erythema and lesion (enlarging wound through skin  greysish discharge  red around it  ) present. Neurological:      General: No focal deficit present. Mental Status: She is alert. Gait: Gait normal.         ASSESSMENT/PLAN:    Fatuma was seen today for follow-up from hospital and leg swelling. Diagnoses and all orders for this visit:    Pyoderma gangrenosa  -     Culture, Wound  -     HYDROcodone-acetaminophen (LORCET) 5-325 MG per tablet; Take 1 tablet by mouth every 8 hours as needed for Pain for up to 7 days. Intended supply: 3 days. Take lowest dose possible to manage pain  -     External Referral To Dermatology    Cellulitis of right lower extremity  -     Culture, Wound  -     HYDROcodone-acetaminophen (LORCET) 5-325 MG per tablet; Take 1 tablet by mouth every 8 hours as needed for Pain for up to 7 days. Intended supply: 3 days. Take lowest dose possible to manage pain  -     External Referral To Dermatology    Wound infection  -     Culture, Wound  -     HYDROcodone-acetaminophen (LORCET) 5-325 MG per tablet;  Take 1 tablet by mouth every 8 hours as needed for Pain for up to 7 days. Intended supply: 3 days. Take lowest dose possible to manage pain  -     External Referral To Dermatology    needs by  Treat per culture  Reviewed oarrs     Orders Placed This Encounter   Medications    HYDROcodone-acetaminophen (LORCET) 5-325 MG per tablet     Sig: Take 1 tablet by mouth every 8 hours as needed for Pain for up to 7 days. Intended supply: 3 days. Take lowest dose possible to manage pain     Dispense:  21 tablet     Refill:  0     Reduce doses taken as pain becomes manageable        Return in about 2 weeks (around 2/10/2022), or if symptoms worsen or fail to improve. There are no Patient Instructions on file for this visit.

## 2022-01-27 NOTE — TELEPHONE ENCOUNTER
Pt wants to know if she's to still take 40 MG of lasix. Per pt she had been on 20 MG but Dr Tre Gonzalez has had her taking 40 MG. Pl advise.

## 2022-01-30 LAB
GRAM STAIN RESULT: ABNORMAL
ORGANISM: ABNORMAL
WOUND/ABSCESS: ABNORMAL

## 2022-01-31 ENCOUNTER — TELEPHONE (OUTPATIENT)
Dept: FAMILY MEDICINE CLINIC | Age: 72
End: 2022-01-31

## 2022-01-31 RX ORDER — DOXYCYCLINE HYCLATE 100 MG
100 TABLET ORAL 2 TIMES DAILY
Qty: 20 TABLET | Refills: 0 | Status: SHIPPED | OUTPATIENT
Start: 2022-01-31 | End: 2022-02-10

## 2022-01-31 RX ORDER — FUROSEMIDE 20 MG/1
TABLET ORAL
Qty: 60 TABLET | Refills: 0 | Status: SHIPPED | OUTPATIENT
Start: 2022-01-31 | End: 2022-04-01

## 2022-01-31 NOTE — TELEPHONE ENCOUNTER
----- Message from Izzy Ervin sent at 1/31/2022  2:12 PM EST -----  Subject: Message to Provider    QUESTIONS  Information for Provider? Pt has a Dermatology referral from Dr. Tre Gonzalez   to [facility] Derm[facility]ology, she stated that Dr. Tre Gonzalez scheduled her appt   but she was unable to make it due to the weather. She called [facility],   however, they are unable to get her in until July. Pt is asking if Dr. Tre Gonzalez could make another appt for her. Please advise.   ---------------------------------------------------------------------------  --------------  CALL BACK INFO  What is the best way for the office to contact you? OK to leave message on   voicemail  Preferred Call Back Phone Number? [phone]  ---------------------------------------------------------------------------  --------------  SCRIPT ANSWERS  Relationship to Patient?  Self Statement Selected

## 2022-02-01 RX ORDER — DULOXETIN HYDROCHLORIDE 60 MG/1
CAPSULE, DELAYED RELEASE ORAL
Qty: 90 CAPSULE | Refills: 1 | Status: SHIPPED | OUTPATIENT
Start: 2022-02-01 | End: 2022-03-31 | Stop reason: ALTCHOICE

## 2022-02-01 NOTE — TELEPHONE ENCOUNTER
Returned call to the pt she was not able to get an appt with [facility] until July. Pt will go thru the dermatology list we gave her to see is someone can see her soon. Her rheumatologist was trying to get her in with derm also but hasn'theard back from them. I told her we can try to get her in with someone and will let her know.

## 2022-02-04 ASSESSMENT — ENCOUNTER SYMPTOMS
COLOR CHANGE: 1
NAUSEA: 0
SHORTNESS OF BREATH: 1
COUGH: 0
VOMITING: 0
WHEEZING: 0
DIARRHEA: 0
EYE REDNESS: 1
ABDOMINAL PAIN: 0
CONSTIPATION: 0

## 2022-02-07 ENCOUNTER — TELEPHONE (OUTPATIENT)
Dept: CARDIOLOGY CLINIC | Age: 72
End: 2022-02-07

## 2022-02-07 ENCOUNTER — OFFICE VISIT (OUTPATIENT)
Dept: CARDIOLOGY CLINIC | Age: 72
End: 2022-02-07
Payer: MEDICARE

## 2022-02-07 VITALS
HEIGHT: 61 IN | OXYGEN SATURATION: 98 % | BODY MASS INDEX: 35.87 KG/M2 | WEIGHT: 190 LBS | SYSTOLIC BLOOD PRESSURE: 124 MMHG | HEART RATE: 81 BPM | DIASTOLIC BLOOD PRESSURE: 72 MMHG

## 2022-02-07 DIAGNOSIS — I49.1 PAC (PREMATURE ATRIAL CONTRACTION): ICD-10-CM

## 2022-02-07 DIAGNOSIS — Z95.810 ICD (IMPLANTABLE CARDIOVERTER-DEFIBRILLATOR) IN PLACE: ICD-10-CM

## 2022-02-07 DIAGNOSIS — M79.89 LEG SWELLING: ICD-10-CM

## 2022-02-07 DIAGNOSIS — I47.1 SVT (SUPRAVENTRICULAR TACHYCARDIA) (HCC): ICD-10-CM

## 2022-02-07 DIAGNOSIS — R00.2 PALPITATIONS: ICD-10-CM

## 2022-02-07 DIAGNOSIS — Z86.79 HISTORY OF VENTRICULAR TACHYCARDIA: ICD-10-CM

## 2022-02-07 DIAGNOSIS — Q20.9: Primary | ICD-10-CM

## 2022-02-07 PROCEDURE — G8428 CUR MEDS NOT DOCUMENT: HCPCS | Performed by: INTERNAL MEDICINE

## 2022-02-07 PROCEDURE — 99214 OFFICE O/P EST MOD 30 MIN: CPT | Performed by: INTERNAL MEDICINE

## 2022-02-07 PROCEDURE — 3017F COLORECTAL CA SCREEN DOC REV: CPT | Performed by: INTERNAL MEDICINE

## 2022-02-07 PROCEDURE — G8484 FLU IMMUNIZE NO ADMIN: HCPCS | Performed by: INTERNAL MEDICINE

## 2022-02-07 PROCEDURE — 1090F PRES/ABSN URINE INCON ASSESS: CPT | Performed by: INTERNAL MEDICINE

## 2022-02-07 PROCEDURE — 1036F TOBACCO NON-USER: CPT | Performed by: INTERNAL MEDICINE

## 2022-02-07 PROCEDURE — G8417 CALC BMI ABV UP PARAM F/U: HCPCS | Performed by: INTERNAL MEDICINE

## 2022-02-07 PROCEDURE — 4040F PNEUMOC VAC/ADMIN/RCVD: CPT | Performed by: INTERNAL MEDICINE

## 2022-02-07 PROCEDURE — 1123F ACP DISCUSS/DSCN MKR DOCD: CPT | Performed by: INTERNAL MEDICINE

## 2022-02-07 PROCEDURE — G8399 PT W/DXA RESULTS DOCUMENT: HCPCS | Performed by: INTERNAL MEDICINE

## 2022-02-07 NOTE — TELEPHONE ENCOUNTER
Please schedule patient for device check next at patients earliest convenience when I'm next in [place], preferably on 2/25/2022. Thank you!

## 2022-02-07 NOTE — TELEPHONE ENCOUNTER
Elvis,    2/2 Ventricular Tachycardia s/p ICD needs enrolled in our device clinic. She has remote monitoring. She reports she was admitted to [address] 9/20/21 after admitted 9/30 for chest pain and palpitations requiring cardioversion in the field 2/2 Vtach. EP consulted and ICD placed 10/5/21. NSTEMI s/p LHC 10/1/21 clean arteries, demand in setting of VT, LVEF 50-55%. She was s/p monoclonal antibodies 9/28/21. She reports that she has recovered and continues to work on her deconditioning.  -Hx palpitations/SVT/PAC's.

## 2022-02-10 NOTE — TELEPHONE ENCOUNTER
LVM for pt to return call and set up device check at [facility]

## 2022-02-21 ENCOUNTER — TELEPHONE (OUTPATIENT)
Dept: FAMILY MEDICINE CLINIC | Age: 72
End: 2022-02-21

## 2022-02-21 RX ORDER — FLUCONAZOLE 100 MG/1
100 TABLET ORAL DAILY
Qty: 7 TABLET | Refills: 0 | Status: SHIPPED | OUTPATIENT
Start: 2022-02-21 | End: 2022-02-28

## 2022-02-21 NOTE — TELEPHONE ENCOUNTER
Patient has yeast infection from all the antibiotics she has been on, she is asking if you can call in something to [facility].     Please call,  [phone]

## 2022-02-25 ENCOUNTER — NURSE ONLY (OUTPATIENT)
Dept: CARDIOLOGY CLINIC | Age: 72
End: 2022-02-25
Payer: MEDICARE

## 2022-02-25 DIAGNOSIS — I47.20 VT (VENTRICULAR TACHYCARDIA): ICD-10-CM

## 2022-02-25 DIAGNOSIS — Z95.810 DUAL ICD (IMPLANTABLE CARDIOVERTER-DEFIBRILLATOR) IN PLACE: Primary | ICD-10-CM

## 2022-02-25 DIAGNOSIS — I46.9 CARDIAC ARREST (HCC): ICD-10-CM

## 2022-02-25 DIAGNOSIS — I47.1 SVT (SUPRAVENTRICULAR TACHYCARDIA) (HCC): ICD-10-CM

## 2022-02-25 PROCEDURE — 93289 INTERROG DEVICE EVAL HEART: CPT | Performed by: INTERNAL MEDICINE

## 2022-02-25 NOTE — PROGRESS NOTES
Remote pull done via abbot phone marnie -  not up to date and unable to interrogate Joy ICD  [facility]lin remote care transfer requested from [facility] today. battery with 7.5 to 8.4y  till CATHRYN  AP <1%   1.6%  no new episodes. Corvue CHF monitor is WNL and near baseline.   Pt to f/u with Dr. Leim Bosworth in May 2022

## 2022-03-02 DIAGNOSIS — E03.9 ACQUIRED HYPOTHYROIDISM: ICD-10-CM

## 2022-03-02 RX ORDER — LEVOTHYROXINE SODIUM 88 UG/1
TABLET ORAL
Qty: 90 TABLET | Refills: 1 | Status: SHIPPED | OUTPATIENT
Start: 2022-03-02 | End: 2022-06-09

## 2022-03-03 DIAGNOSIS — F51.01 PRIMARY INSOMNIA: ICD-10-CM

## 2022-03-03 RX ORDER — ZOLPIDEM TARTRATE 10 MG/1
10 TABLET ORAL NIGHTLY PRN
Qty: 90 TABLET | Refills: 0 | Status: SHIPPED | OUTPATIENT
Start: 2022-03-03 | End: 2022-05-24

## 2022-03-03 NOTE — PROGRESS NOTES
Hospitalist Medicine   Progress Note    Date of Service: 11/29/21  MRN: [id number]  LOS: 2      CC:    Chief Complaint   Patient presents with    Cellulitis     right lower leg x 10 days, has been on oral antibiotics and swelling/redness worsening       INTERVAL HISTORY:     No acute events overnight. Wound burning overnight. Not hurting today. MEDICATIONS:   Scheduled Meds:   vancomycin (VANCOCIN) intermittent dosing (placeholder)   Other RX Placeholder    cefepime  2,000 mg IntraVENous Q12H    aspirin EC  81 mg Oral Daily    DULoxetine  60 mg Oral Daily    levothyroxine  88 mcg Oral Daily    metoprolol tartrate  25 mg Oral BID    predniSONE  10 mg Oral Daily    Roflumilast  500 mcg Oral Daily    tiotropium  1 puff Inhalation Daily    sodium chloride flush  5-40 mL IntraVENous 2 times per day    enoxaparin  40 mg SubCUTAneous Daily    budesonide-formoterol  2 puff Inhalation BID    dilTIAZem  180 mg Oral Daily     Continuous Infusions:   sodium chloride Stopped (11/28/21 0550)         PHYSICAL EXAM:   /85   Pulse 91   Temp 97.5 °F (36.4 °C) (Oral)   Resp 18   Ht 5' 1\" (1.549 m)   Wt 203 lb 14.8 oz (92.5 kg)   LMP  (LMP Unknown)   SpO2 91%   BMI 38.53 kg/m²     Physical Exam  Vitals reviewed. Constitutional:       General: She is not in acute distress. Appearance: Normal appearance. She is well-developed. She is obese. She is not diaphoretic. HENT:      Head: Normocephalic and atraumatic. Mouth/Throat:      Mouth: Mucous membranes are moist.   Eyes:      Extraocular Movements: Extraocular movements intact. Pupils: Pupils are equal, round, and reactive to light. Cardiovascular:      Rate and Rhythm: Normal rate and regular rhythm. Pulses: Normal pulses. Heart sounds: Normal heart sounds. No murmur heard. No friction rub. No gallop. Pulmonary:      Effort: Pulmonary effort is normal. No respiratory distress.       Breath sounds: Normal breath sounds. No wheezing or rales. Chest:      Chest wall: No tenderness. Abdominal:      General: Bowel sounds are normal. There is no distension. Palpations: Abdomen is soft. There is no mass. Tenderness: There is no abdominal tenderness. There is no guarding. Musculoskeletal:         General: No tenderness or deformity. Cervical back: Normal range of motion and neck supple. Right lower leg: No edema. Left lower leg: No edema. Skin:     General: Skin is warm and dry. Capillary Refill: Capillary refill takes less than 2 seconds. Findings: Erythema, lesion and rash present. Neurological:      General: No focal deficit present. Mental Status: She is alert and oriented to person, place, and time. Psychiatric:         Behavior: Behavior normal.          RLE wound 11/29          LABS / IMAGING:     Recent Labs     11/27/21  1609   WBC 9.9   HGB 13.9   HCT 42.3        Recent Labs     11/27/21  1609      K 3.8   CL 98*   CO2 25   BUN 16   CREATININE 0.7   CALCIUM 9.4     Recent Labs     11/27/21  1609   AST 18   ALT 19   BILITOT 0.6   ALKPHOS 61     No results for input(s): INR in the last 72 hours. No results for input(s): Cee Risk in the last 72 hours. No orders to display       ASSESSMENT:     Leg wound with cellulitis, right  COPD  Hx of Covid SVT and recent cardiac arrest (s/p AICD 10/2021)   Sarcoidosis, optho (on chronic prednisone)  Obesity   HTN      PLAN:     - MRSA swab pending   - wound cx   - continue IV cefepime and vanco  - continue rest of home meds  - reordered abx eye drops  - podiatry consulted      Dispo: Inpatient med/surg.     PPx:     lovenox  PT/OT:    Not indicated   Code status:  Full Code            Elizabeth Jett MD  Hospitalist     11/29/202110:15 AM Spironolactone Pregnancy And Lactation Text: This medication can cause feminization of the male fetus and should be avoided during pregnancy. The active metabolite is also found in breast milk.

## 2022-03-03 NOTE — TELEPHONE ENCOUNTER
Pt is needing a refill on   zolpidem (AMBIEN) 10 MG tablet     [address] [facility], [place] [phone] - F [phone]   [address] [facility]     Last OV:   1/27/2022  Next OV:   3/31/2022

## 2022-03-24 ENCOUNTER — TELEPHONE (OUTPATIENT)
Dept: FAMILY MEDICINE CLINIC | Age: 72
End: 2022-03-24

## 2022-03-24 DIAGNOSIS — R25.2 LEG CRAMPS: ICD-10-CM

## 2022-03-24 DIAGNOSIS — R73.03 PRE-DIABETES: ICD-10-CM

## 2022-03-24 DIAGNOSIS — E05.00 GRAVES DISEASE: Primary | ICD-10-CM

## 2022-03-24 NOTE — TELEPHONE ENCOUNTER
Please review pts labs, is there anything else needed? Lipid last done 10/1/21@ [facility]  Pt is scheduled for an AWV on 3/31/22    Left message for pt lab order for TSH faxed to [facility]. Fax [phone] and [phone].

## 2022-03-24 NOTE — TELEPHONE ENCOUNTER
----- Message from Kristi Crandall sent at 3/24/2022  8:23 AM EDT -----  Subject: Message to Provider    QUESTIONS  Information for Provider? Fax for [facility] is [phone] for the   bloodwork orders she needs completed there   ---------------------------------------------------------------------------  --------------  CALL BACK INFO  What is the best way for the office to contact you? OK to leave message on   voicemail  Preferred Call Back Phone Number? [phone]  ---------------------------------------------------------------------------  --------------  SCRIPT ANSWERS  Relationship to Patient?  Self

## 2022-03-25 ENCOUNTER — TELEPHONE (OUTPATIENT)
Dept: FAMILY MEDICINE CLINIC | Age: 72
End: 2022-03-25

## 2022-03-25 RX ORDER — DOXYCYCLINE HYCLATE 100 MG
100 TABLET ORAL 2 TIMES DAILY
Qty: 14 TABLET | Refills: 0 | Status: SHIPPED | OUTPATIENT
Start: 2022-03-25 | End: 2022-04-01

## 2022-03-25 RX ORDER — METHYLPREDNISOLONE 4 MG/1
TABLET ORAL
Qty: 1 KIT | Refills: 0 | Status: SHIPPED | OUTPATIENT
Start: 2022-03-25 | End: 2022-03-31

## 2022-03-25 NOTE — TELEPHONE ENCOUNTER
Pt has an apt for Wednesday but pt feels like she is getting bronchitis.        Chest congestion   Cough - clear mucus   Wheezing bad   Using her nebulizer       [facility] in [place]

## 2022-03-28 LAB
ESTIMATED AVERAGE GLUCOSE: 128 MG/DL
HBA1C MFR BLD: 6.1 % (ref 4.2–5.6)
MAGNESIUM: 2.1 MG/DL (ref 1.6–2.4)
TSH ULTRASENSITIVE: 2.25 MCIU/ML (ref 0.27–4.2)

## 2022-03-30 ENCOUNTER — TELEPHONE (OUTPATIENT)
Dept: CARDIOLOGY CLINIC | Age: 72
End: 2022-03-30

## 2022-03-30 NOTE — TELEPHONE ENCOUNTER
Manual remote transmission sent in per patient preference. remote interrogation performed by [facility] 3/25  no EGM's available for 2 NSVT episodes in march 2022  both 6 sec in duration, max V rate 176bpm - this is not a new DX for pt.   no therapy delivered. AP <1%     1.2%  corvue thoracic impedance monitor appears WNL limits and trending dry    These results were conveyed to patient via VM and exported to Dr. Olivier Veliz for review.

## 2022-03-30 NOTE — TELEPHONE ENCOUNTER
Pt called to kalli price had been transferred from [facility] to [facility] and to discuss palpitations sx she had while on vacation.

## 2022-03-31 ENCOUNTER — OFFICE VISIT (OUTPATIENT)
Dept: FAMILY MEDICINE CLINIC | Age: 72
End: 2022-03-31
Payer: MEDICARE

## 2022-03-31 VITALS
WEIGHT: 181.6 LBS | HEIGHT: 61 IN | BODY MASS INDEX: 34.29 KG/M2 | OXYGEN SATURATION: 93 % | DIASTOLIC BLOOD PRESSURE: 68 MMHG | HEART RATE: 79 BPM | TEMPERATURE: 97.4 F | SYSTOLIC BLOOD PRESSURE: 126 MMHG | RESPIRATION RATE: 16 BRPM

## 2022-03-31 DIAGNOSIS — Z12.31 ENCOUNTER FOR SCREENING MAMMOGRAM FOR MALIGNANT NEOPLASM OF BREAST: ICD-10-CM

## 2022-03-31 DIAGNOSIS — F51.01 PRIMARY INSOMNIA: ICD-10-CM

## 2022-03-31 DIAGNOSIS — M35.01 KERATOCONJUNCTIVITIS SICCA OF BOTH EYES (HCC): ICD-10-CM

## 2022-03-31 DIAGNOSIS — F32.A ANXIETY AND DEPRESSION: ICD-10-CM

## 2022-03-31 DIAGNOSIS — L97.919 VENOUS ULCER OF RIGHT LEG (HCC): ICD-10-CM

## 2022-03-31 DIAGNOSIS — E05.00 GRAVES DISEASE: ICD-10-CM

## 2022-03-31 DIAGNOSIS — E03.9 ACQUIRED HYPOTHYROIDISM: ICD-10-CM

## 2022-03-31 DIAGNOSIS — I83.019 VENOUS ULCER OF RIGHT LEG (HCC): ICD-10-CM

## 2022-03-31 DIAGNOSIS — F32.9 REACTIVE DEPRESSION: ICD-10-CM

## 2022-03-31 DIAGNOSIS — Z00.00 WELCOME TO MEDICARE PREVENTIVE VISIT: Primary | ICD-10-CM

## 2022-03-31 DIAGNOSIS — F41.9 ANXIETY AND DEPRESSION: ICD-10-CM

## 2022-03-31 DIAGNOSIS — I46.9 CARDIAC ARREST (HCC): ICD-10-CM

## 2022-03-31 DIAGNOSIS — R73.03 PRE-DIABETES: ICD-10-CM

## 2022-03-31 DIAGNOSIS — J44.1 COPD EXACERBATION (HCC): ICD-10-CM

## 2022-03-31 DIAGNOSIS — D86.9 SARCOID: ICD-10-CM

## 2022-03-31 PROCEDURE — 3017F COLORECTAL CA SCREEN DOC REV: CPT | Performed by: INTERNAL MEDICINE

## 2022-03-31 PROCEDURE — 4040F PNEUMOC VAC/ADMIN/RCVD: CPT | Performed by: INTERNAL MEDICINE

## 2022-03-31 PROCEDURE — G0402 INITIAL PREVENTIVE EXAM: HCPCS | Performed by: INTERNAL MEDICINE

## 2022-03-31 PROCEDURE — 1123F ACP DISCUSS/DSCN MKR DOCD: CPT | Performed by: INTERNAL MEDICINE

## 2022-03-31 RX ORDER — ARIPIPRAZOLE 5 MG/1
5 TABLET ORAL DAILY
Qty: 30 TABLET | Refills: 3 | Status: SHIPPED | OUTPATIENT
Start: 2022-03-31 | End: 2022-03-31 | Stop reason: CLARIF

## 2022-03-31 RX ORDER — SUVOREXANT 10 MG/1
10 TABLET, FILM COATED ORAL NIGHTLY
Qty: 30 TABLET | Refills: 2 | Status: SHIPPED | OUTPATIENT
Start: 2022-03-31 | End: 2022-03-31 | Stop reason: CLARIF

## 2022-03-31 RX ORDER — ARIPIPRAZOLE 5 MG/1
5 TABLET ORAL DAILY
Qty: 30 TABLET | Refills: 3 | Status: SHIPPED | OUTPATIENT
Start: 2022-03-31 | End: 2022-04-25 | Stop reason: SDUPTHER

## 2022-03-31 RX ORDER — LEVOFLOXACIN 500 MG/1
500 TABLET, FILM COATED ORAL DAILY
Qty: 10 TABLET | Refills: 0 | Status: SHIPPED | OUTPATIENT
Start: 2022-03-31 | End: 2022-04-10

## 2022-03-31 RX ORDER — SUVOREXANT 10 MG/1
10 TABLET, FILM COATED ORAL NIGHTLY
Qty: 30 TABLET | Refills: 2 | Status: SHIPPED | OUTPATIENT
Start: 2022-03-31 | End: 2022-04-07 | Stop reason: DRUGHIGH

## 2022-03-31 ASSESSMENT — ENCOUNTER SYMPTOMS
DIARRHEA: 0
SHORTNESS OF BREATH: 1
SORE THROAT: 1
VOMITING: 0
ABDOMINAL PAIN: 0
TROUBLE SWALLOWING: 0
BACK PAIN: 1
NAUSEA: 0
WHEEZING: 1
SINUS PAIN: 1
CONSTIPATION: 0
COUGH: 1
BLOOD IN STOOL: 0
SINUS PRESSURE: 1

## 2022-03-31 ASSESSMENT — PATIENT HEALTH QUESTIONNAIRE - PHQ9
SUM OF ALL RESPONSES TO PHQ9 QUESTIONS 1 & 2: 1
SUM OF ALL RESPONSES TO PHQ QUESTIONS 1-9: 1
2. FEELING DOWN, DEPRESSED OR HOPELESS: 1
SUM OF ALL RESPONSES TO PHQ QUESTIONS 1-9: 1
1. LITTLE INTEREST OR PLEASURE IN DOING THINGS: 0
SUM OF ALL RESPONSES TO PHQ QUESTIONS 1-9: 1
SUM OF ALL RESPONSES TO PHQ QUESTIONS 1-9: 1

## 2022-03-31 ASSESSMENT — LIFESTYLE VARIABLES: HOW OFTEN DO YOU HAVE A DRINK CONTAINING ALCOHOL: NEVER

## 2022-03-31 NOTE — PATIENT INSTRUCTIONS
Personalized Preventive Plan for Eduarda Spencer - 3/31/2022  Medicare offers a range of preventive health benefits. Some of the tests and screenings are paid in full while other may be subject to a deductible, co-insurance, and/or copay. Some of these benefits include a comprehensive review of your medical history including lifestyle, illnesses that may run in your family, and various assessments and screenings as appropriate. After reviewing your medical record and screening and assessments performed today your provider may have ordered immunizations, labs, imaging, and/or referrals for you. A list of these orders (if applicable) as well as your Preventive Care list are included within your After Visit Summary for your review. Other Preventive Recommendations:    · A preventive eye exam performed by an eye specialist is recommended every 1-2 years to screen for glaucoma; cataracts, macular degeneration, and other eye disorders. · A preventive dental visit is recommended every 6 months. · Try to get at least 150 minutes of exercise per week or 10,000 steps per day on a pedometer . · Order or download the FREE \"Exercise & Physical Activity: Your Everyday Guide\" from [employer]. Call 0-104.570.3499 or search [employer] online. · You need 7827-1373 mg of calcium and 6948-7921 IU of vitamin D per day. It is possible to meet your calcium requirement with diet alone, but a vitamin D supplement is usually necessary to meet this goal.  · When exposed to the sun, use a sunscreen that protects against both UVA and UVB radiation with an SPF of 30 or greater. Reapply every 2 to 3 hours or after sweating, drying off with a towel, or swimming. · Always wear a seat belt when traveling in a car. Always wear a helmet when riding a bicycle or motorcycle. Patient Education        Well Visit, Over 72: Care Instructions  Overview     Well visits can help you stay healthy.  Your doctor has checked your overall health and may have suggested ways to take good care of yourself. Your doctor also may have recommended tests. At home, you can help prevent illness withhealthy eating, regular exercise, and other steps. Follow-up care is a key part of your treatment and safety. Be sure to make and go to all appointments, and call your doctor if you are having problems. It's also a good idea to know your test results and keep alist of the medicines you take. How can you care for yourself at home? Get screening tests that you and your doctor decide on. Screening helps find diseases before any symptoms appear. Eat healthy foods. Choose fruits, vegetables, whole grains, protein, and low-fat dairy foods. Limit fat, especially saturated fat. Reduce salt in your diet. Limit alcohol. If you are a man, have no more than 2 drinks a day or 14 drinks a week. If you are a woman, have no more than 1 drink a day or 7 drinks a week. Since alcohol affects older adults differently, you may want to limit alcohol even more. Or you may not want to drink at all. Get at least 30 minutes of exercise on most days of the week. Walking is a good choice. You also may want to do other activities, such as running, swimming, cycling, or playing tennis or team sports. Reach and stay at a healthy weight. This will lower your risk for many problems, such as obesity, diabetes, heart disease, and high blood pressure. Do not smoke. Smoking can make health problems worse. If you need help quitting, talk to your doctor about stop-smoking programs and medicines. These can increase your chances of quitting for good. Care for your mental health. It is easy to get weighed down by worry and stress. Learn strategies to manage stress, like deep breathing and mindfulness, and stay connected with your family and community. If you find you often feel sad or hopeless, talk with your doctor. Treatment can help.   Talk to your doctor about whether you have any risk factors for sexually transmitted infections (STIs). You can help prevent STIs if you wait to have sex with a new partner (or partners) until you've each been tested for STIs. It also helps if you use condoms (male or female condoms) and if you limit your sex partners to one person who only has sex with you. Vaccines are available for some STIs. If you think you may have a problem with alcohol or drug use, talk to your doctor. This includes prescription medicines (such as amphetamines and opioids) and illegal drugs (such as cocaine and methamphetamine). Your doctor can help you figure out what type of treatment is best for you. Protect your skin from too much sun. When you're outdoors from 10 a.m. to 4 p.m., stay in the shade or cover up with clothing and a hat with a wide brim. Wear sunglasses that block UV rays. Even when it's cloudy, put broad-spectrum sunscreen (SPF 30 or higher) on any exposed skin. See a dentist one or two times a year for checkups and to have your teeth cleaned. Wear a seat belt in the car. When should you call for help? Watch closely for changes in your health, and be sure to contact your doctor if you have any problems or symptoms that concern you. Where can you learn more? Go to https://TelePacific [email or web address].healthU-Play [email or web address] org and sign in to your [employer] account. Enter E094 in the [facility] box to learn more about \"Well Visit, Over 65: Care Instructions. \"     If you do not have an account, please click on the \"Sign Up Now\" link. Current as of: October 6, 2021               Content Version: 13.2  © 2017-1499 Healthwise, Incorporated. Care instructions adapted under license by [facility] ([facility]). If you have questions about a medical condition or this instruction, always ask your healthcare professional. [place] [address] any warranty or liability for your use of this information.          Patient Education        Learning About Living Mary  What is a living will? A living will, also called a declaration, is a legal form. It tells your family and your doctor your wishes when you can't speak for yourself. It's used by the health professionals who will treat you as you near the end of your life or ifyou get seriously hurt or ill. If you put your wishes in writing, your loved ones and others will know what kind of care you want. They won't need to guess. This can ease your mind and behelpful to others. And you can change or cancel your living will at any time. A living will is not the same as an estate or property will. An estate willexplains what you want to happen with your money and property after you die. How do you use it? Keep these facts in mind about how a living will is used. Your living will is used only if you can't speak or make decisions for yourself. Most often, one or more doctors must certify that you can't speak or decide for yourself before your living will takes effect. If you get better and can speak for yourself again, you can accept or refuse any treatment. It doesn't matter what you said in your living will. Some states may limit your right to refuse treatment in certain cases. For example, you may need to clearly state in your living will that you don't want artificial hydration and nutrition, such as being fed through a tube. Is a living will a legal document? A living will is a legal document. Each state has its own laws about livingwills. And a living will may be called something else in your state. Here are some things to know about living [place]. You don't need an  to complete a living will. But legal advice can be helpful if your state's laws are unclear. It can also help if your health history is complicated or your family can't agree on what should be in your living will. You can change your living will at any time.  Some people find that their wishes about end-of-life care change as their health changes. If you make big changes to your living will, complete a new form. If you move to another state, make sure that your living will is legal in the state where you now live. In most cases, doctors will respect your wishes even if you have a form from a different state. You might use a universal form that has been approved by many states. This kind of form can sometimes be filled out and stored online. Your digital copy will then be available wherever you have a connection to the internet. The doctors and nurses who need to treat you can find it right away. Your state may offer an online registry. This is another place where you can store your living will online. It's a good idea to get your living will notarized. This means using a person called a Daptiv to watch two people sign, or witness, your living will. What should you know when you create a living will? Here are some questions to ask yourself as you make your living will. Do you know enough about life support methods that might be used? If not, talk to your doctor so you know what might be done if you can't breathe on your own, your heart stops, or you can't swallow. What things would you still want to be able to do after you receive life-support methods? Would you want to be able to walk? To speak? To eat on your own? To live without the help of machines? Do you want certain Yazdanism practices performed if you become very ill? If you have a choice, where do you want to be cared for? In your home? At a hospital or nursing home? If you have a choice at the end of your life, where would you prefer to die? At home? In a hospital or nursing home? Somewhere else? Would you prefer to be buried or cremated? Do you want your organs to be donated after you die? What should you do with your living will? Make sure that your family members and your health care agent have copies of your living will (also called a declaration).   Give your doctor a copy of your living will. Ask to have it kept as part of your medical record. If you have more than one doctor, make sure that each one has a copy. Put a copy of your living will where it can be easily found. For example, some people may put a copy on their refrigerator door. If you are using a digital copy, be sure your doctor, family members, and health care agent know how to find and access it. Where can you learn more? Go to [email or web address] [email or web address] org and sign in to your [employer] account. Enter N715 in the [employer] box to learn more about \"Learning About Living Karsten Oliveira. \"     If you do not have an account, please click on the \"Sign Up Now\" link. Current as of: October 18, 2021               Content Version: 13.2  © 3318-9145 [employer]. Care instructions adapted under license by [facility] ([facility]). If you have questions about a medical condition or this instruction, always ask your healthcare professional. [place] [address] any warranty or liability for your use of this information. Patient Education        Advance Directives: Care Instructions  Overview  An advance directive is a legal way to state your wishes at the end of your life. It tells your family and your doctor what to do if you can't say what youwant. There are two main types of advance directives. You can change them any timeyour wishes change. Living will. This form tells your family and your doctor your wishes about life support and other treatment. The form is also called a declaration. Medical power of . This form lets you name a person to make treatment decisions for you when you can't speak for yourself. This person is called a health care agent (health care proxy, health care surrogate). The form is also called a durable power of  for health care.   If you do not have an advance directive, decisions about your medical care maybe made by a family member, or by a doctor or a  who doesn't know you. It may help to think of an advance directive as a gift to the people who carefor you. If you have one, they won't have to make tough decisions by themselves. Follow-up care is a key part of your treatment and safety. Be sure to make and go to all appointments, and call your doctor if you are having problems. It's also a good idea to know your test results and keep alist of the medicines you take. What should you include in an advance directive? Many states have a unique advance directive form. (It may ask you to address specific issues.) Or you might use a universal form that's approved by manystates. If your form doesn't tell you what to address, it may be hard to know what to include in your advance directive. Use the questions below to help you getstarted. Who do you want to make decisions about your medical care if you are not able to? What life-support measures do you want if you have a serious illness that gets worse over time or can't be cured? What are you most afraid of that might happen? (Maybe you're afraid of having pain, losing your independence, or being kept alive by machines.)  Where would you prefer to die? (Your home? A hospital? A nursing home?)  Do you want to donate your organs when you die? Do you want certain Gnosticism practices performed before you die? When should you call for help? Be sure to contact your doctor if you have any questions. Where can you learn more? Go to https://Space Exploration Technologiesaudie.Busap. org and sign in to your [employer] account. Enter R264 in the [employer] box to learn more about \"Advance Directives: Care Instructions. \"     If you do not have an account, please click on the \"Sign Up Now\" link. Current as of: October 18, 2021               Content Version: 13.2  © 3875-6999 Healthwise, Incorporated. Care instructions adapted under license by [facility] ([facility]).  If you have questions about a medical condition or this instruction, always ask your healthcare professional. [place] [address] any warranty or liability for your use of this information.

## 2022-03-31 NOTE — PROGRESS NOTES
SUBJECTIVE:  Katlyn Sher is a 73 y.o. female being evaluated for:    Chief Complaint   Patient presents with    Medicare AWV       HPI   Left leg is much better  Almost completely healed only a scape  Was treated at [facility] wound clinic with calamine and vasciline wrap  Edema much better with treatment  Venostasis ulcers has upcoming visit with vascular   COPD and is still doing badly  More sob  Oxygen lower down to 89   Using breathing treatmens and up to 93%  Head congestion  Sneezing  Nose running clear   Pressure around eyes  NO ear pain  NO sore throat  Sputum  clear also  No bad heart burn or indigestion    Losing over 20 # since and not trying    Down 2 sizes  Has started at gym  Walking  treatmenl for 20 minutes before sob NO gi symptoms  CT scan of chest last august fine and due again this august On doxy and has not done anything  Medrol dose pack not helping either   Stress and anxiety  1 year anniversary of 's death  Still gets a lot of anxiety  Children are a great support   Neither celexa or cymbalta has helped  Up with [place] and ativan  Tired and drags all day     Sarcoid and seeing rheumatology  On MTX As leg heals to start remicaid hopefully will help her ey NO double visiton     Cardiac arrest after covid  vaccine  No hx of heart disease  Has pacer defibrilltor in place seeing Dr Jenkins Richmond   Allergies   Allergen Reactions    Atarax [Hydroxyzine] Hives    Codeine Other (See Comments)     Makes her feel hyper    Sulfa Antibiotics Nausea Only    Zithromax [Azithromycin]      Itching and burning     Current Outpatient Medications   Medication Sig Dispense Refill    levoFLOXacin (LEVAQUIN) 500 MG tablet Take 1 tablet by mouth daily for 10 days 10 tablet 0    ARIPiprazole (ABILIFY) 5 MG tablet Take 1 tablet by mouth daily 30 tablet 3    zolpidem (AMBIEN) 10 MG tablet Take 1 tablet by mouth nightly as needed for Sleep for up to 90 days.  90 tablet 0    levothyroxine (SYNTHROID) 88 MCG tablet TAKE 1 TABLET EVERY DAY 90 tablet 1    trimethoprim-polymyxin b (POLYTRIM) 14723-3.1 UNIT/ML-% ophthalmic solution Place 1 drop into the right eye 3 times daily      dilTIAZem (DILACOR XR) 180 MG extended release capsule Take 180 mg by mouth daily      fluticasone-salmeterol (ADVAIR) 500-50 MCG/DOSE diskus inhaler Inhale 1 puff into the lungs 2 times daily      alendronate (FOSAMAX) 70 MG tablet Take 70 mg by mouth every 7 days On saturday      meclizine (ANTIVERT) 12.5 MG tablet Take 12.5 mg by mouth every morning      omeprazole (PRILOSEC) 20 MG delayed release capsule Take 20 mg by mouth daily      Respiratory Therapy Supplies (NEBULIZER/TUBING/MOUTHPIECE) KIT Use Daily Every 4 Hours As Needed DX J44.1 1 kit 0    methotrexate (RHEUMATREX) 2.5 MG chemo tablet Take 25 mg by mouth once a week On wednesday      folic acid (FOLVITE) 1 MG tablet Take 1 mg by mouth daily      aspirin EC 81 MG EC tablet Take 1 tablet by mouth daily 30 tablet 0    metoprolol tartrate (LOPRESSOR) 25 MG tablet 25 mg 2 times daily       RESTASIS 0.05 % ophthalmic emulsion Place 1 drop into both eyes 2 times daily       Multiple Vitamins-Minerals (CENTRUM ADULTS PO) Take by mouth daily      tiotropium (SPIRIVA RESPIMAT) 2.5 MCG/ACT AERS inhaler Inhale 2.5 mcg into the lungs daily       Roflumilast (DALIRESP) 500 MCG tablet Take 500 mcg by mouth daily 30 tablet 3    albuterol (PROVENTIL;VENTOLIN) 90 MCG/ACT inhaler Inhale 2 puffs into the lungs every 6 hours as needed.  ipratropium-albuterol (DUONEB) 0.5-2.5 (3) MG/3ML SOLN nebulizer solution Inhale 1 vial into the lungs every 4 hours.  Montelukast Sodium (SINGULAIR PO) Take 10 mg by mouth nightly       temazepam (RESTORIL) 15 MG capsule Take 1 capsule by mouth nightly as needed for Sleep for up to 10 days. 10 capsule 0    Suvorexant (BELSOMRA) 10 MG TABS Take 20 mg by mouth at bedtime for 90 days.  30 tablet 2    albuterol sulfate HFA (VENTOLIN HFA) 108 (90 Base) MCG/ACT inhaler Inhale 2 puffs into the lungs 4 times daily as needed for Wheezing 18 g 1    furosemide (LASIX) 20 MG tablet Take 1 tablet by mouth once daily 60 tablet 0     No current facility-administered medications for this visit. Social History     Socioeconomic History    Marital status:      Spouse name: Not on file    Number of children: Not on file    Years of education: Not on file    Highest education level: Not on file   Occupational History    Not on file   Tobacco Use    Smoking status: Former Smoker     Packs/day: 1.50     Years: 40.00     Pack years: 60.00     Quit date: 2015     Years since quittin.2    Smokeless tobacco: Never Used   Vaping Use    Vaping Use: Never used   Substance and Sexual Activity    Alcohol use: No    Drug use: No    Sexual activity: Yes     Partners: Male   Other Topics Concern    Not on file   Social History Narrative    Not on file     Social Determinants of Health     Financial Resource Strain: Low Risk     Difficulty of Paying Living Expenses: Not hard at all   Food Insecurity: No Food Insecurity    Worried About [address] [place] [address] [place] in the Last Year: Never true    Dai of Food in the Last Year: Never true   Transportation Needs: No Transportation Needs    Lack of Transportation (Medical): No    Lack of Transportation (Non-Medical):  No   Physical Activity: Insufficiently Active    Days of Exercise per Week: 3 days    Minutes of Exercise per Session: 20 min   Stress:     Feeling of Stress : Not on file   Social Connections:     Frequency of Communication with Friends and Family: Not on file    Frequency of Social Gatherings with Friends and Family: Not on file    Attends Confucianism Services: Not on file    Active Member of Clubs or Organizations: Not on file    Attends Club or Organization Meetings: Not on file    Marital Status: Not on file   Intimate Partner Violence:     Fear of Current or Ex-Partner: Not on file    Emotionally Abused: Not on file    Physically Abused: Not on file    Sexually Abused: Not on file   Housing Stability:     Unable to Pay for Housing in the Last Year: Not on file    Number of [place] in the Last Year: Not on file    Unstable Housing in the Last Year: Not on file      Past Medical History:   Diagnosis Date    Adrenal adenoma 12/07    left    Allergic rhinitis     Asthma     Cervical disc disorder with radiculopathy 1994    COPD (chronic obstructive pulmonary disease) [facility] [address]     Deviated nasal septum 1989    s/p septoplasty    Diverticulosis     h/o abscess  s/p colon resection 11/07 Dr. Richard Rosenthal Endocervical polyp 2/2006    s[place]amam metoplasia    Endometrial polyps 2/06    s hyperplasia    Grave's disease     Graves disease 11/97    s/p SCHMITT now hypothroid    Headache(784.0)     Herniated discs 1997    l5-s1 with DDD    HIGH CHOLESTEROL 6/09    ldl    Hypertension     Hypothyroidism     OA (osteoarthritis of the spine)     anterior wedgiy    Obesity     Osteopenia 1/2004    spine    Pneumonia 2001    hospitalized    Postherpetic neuralgia 6/23/2014    Sarcoid     Scleritis and episcleritis of right eye     Tear meniscus knee     right    Tobacco abuse     Ulcer gastric 1980    Urticaria      Past Surgical History:   Procedure Laterality Date    CARDIAC SURGERY      pacer and defibrillator     NASAL SEPTUM SURGERY      SUBTOTAL COLECTOMY      diverticular abscess     TUBAL LIGATION         Review of Systems   Constitutional: Positive for activity change (just lately with uri ), fatigue and unexpected weight change (weight loss ). HENT: Positive for congestion, postnasal drip, sinus pressure, sinus pain, sneezing and sore throat. Negative for ear pain and trouble swallowing. Eyes: Positive for visual disturbance. Respiratory: Positive for cough, shortness of breath and wheezing.     Cardiovascular: Negative for chest pain, palpitations and leg swelling (much improved ). Gastrointestinal: Negative for abdominal pain, blood in stool, constipation, diarrhea, nausea and vomiting. Endocrine: Negative for cold intolerance and heat intolerance. Self breast exams negative    Genitourinary: Negative for dysuria and vaginal bleeding. Musculoskeletal: Positive for arthralgias (right knee seeing ortho [facility]  cortisone shots and considering total joint replacement ) and back pain (rare ). Skin: Positive for wound (closed now ). Neurological: Positive for headaches. Negative for dizziness, syncope, speech difficulty, weakness, light-headedness and numbness. Hematological: Bruises/bleeds easily (bruising awfully ). Psychiatric/Behavioral: The patient is nervous/anxious. OBJECTIVE:  /68 (Site: Left Upper Arm, Position: Sitting, Cuff Size: Medium Adult)   Pulse 79   Temp 97.4 °F (36.3 °C) (Oral)   Resp 16   Ht 5' 1\" (1.549 m) Comment: without shoes  Wt 181 lb 9.6 oz (82.4 kg)   LMP  (LMP Unknown)   SpO2 93%   BMI 34.31 kg/m²      Body mass index is 34.31 kg/m². Physical Exam  Constitutional:       General: She is not in acute distress. Appearance: Normal appearance. She is obese. She is not ill-appearing. HENT:      Head: Normocephalic and atraumatic. Right Ear: Tympanic membrane and ear canal normal.      Left Ear: Tympanic membrane and ear canal normal.      Nose: Congestion present. Mouth/Throat:      Mouth: Mucous membranes are dry. Pharynx: Posterior oropharyngeal erythema present. No oropharyngeal exudate. Eyes:      Conjunctiva/sclera: Conjunctivae normal.      Comments: Sclera red but better   Neck:      Vascular: No carotid bruit. Cardiovascular:      Rate and Rhythm: Normal rate and regular rhythm. Pulses: Normal pulses. Heart sounds: Normal heart sounds. Pulmonary:      Effort: Pulmonary effort is normal.      Breath sounds: Wheezing present.       Comments: Decreased bs  Abdominal:      General: There is no distension. Palpations: Abdomen is soft. Tenderness: There is no abdominal tenderness. Musculoskeletal:      Cervical back: Neck supple. Right lower leg: No edema. Left lower leg: No edema. Lymphadenopathy:      Cervical: No cervical adenopathy. Skin:     General: Skin is warm and dry. Findings: Lesion (small lesion nearly healed) present. No erythema. Neurological:      General: No focal deficit present. Mental Status: She is alert. Gait: Gait normal.      Comments: Memory intact   Psychiatric:         Behavior: Behavior normal.         Thought Content: Thought content normal.      Comments: dysthymia         ASSESSMENT/PLAN:    Yolande Renteria was seen today for medicare awv. Diagnoses and all orders for this visit:    Welcome to Medicare preventive visit reviewed medicare wellness forms    COPD exacerbation (HCC)  -     levoFLOXacin (LEVAQUIN) 500 MG tablet; Take 1 tablet by mouth daily for 10 days    [facility]  On hold with leg ulcer     Venous ulcer of right leg ([facility] [address] healing and following with wound clinic     Cardiac arrest [facility]) pacer def in place follows with Dr Bharathi Patel vs the vaccine    Keratoconjunctivitis sicca of both eyes ([facility])    Pre-diabetes hga1c 6.1    Acquired hypothyroidism    Graves disease    Reactive depression  -     ARIPiprazole (ABILIFY) 5 MG tablet; Take 1 tablet by mouth daily    Primary insomnia  -     Discontinue: Suvorexant (BELSOMRA) 10 MG TABS; Take 10 mg by mouth at bedtime for 90 days. -     Discontinue: Suvorexant (BELSOMRA) 10 MG TABS; Take 10 mg by mouth at bedtime for 90 days. Encounter for screening mammogram for malignant neoplasm of breast  -     JORGE A DIGITAL SCREEN W OR WO CAD BILATERAL;  Future      Orders Placed This Encounter   Medications    DISCONTD: ARIPiprazole (ABILIFY) 5 MG tablet     Sig: Take 1 tablet by mouth daily Dispense:  30 tablet     Refill:  3    DISCONTD: Suvorexant (BELSOMRA) 10 MG TABS     Sig: Take 10 mg by mouth at bedtime for 90 days. Dispense:  30 tablet     Refill:  2    levoFLOXacin (LEVAQUIN) 500 MG tablet     Sig: Take 1 tablet by mouth daily for 10 days     Dispense:  10 tablet     Refill:  0    DISCONTD: Suvorexant (BELSOMRA) 10 MG TABS     Sig: Take 10 mg by mouth at bedtime for 90 days. Dispense:  30 tablet     Refill:  2    ARIPiprazole (ABILIFY) 5 MG tablet     Sig: Take 1 tablet by mouth daily     Dispense:  30 tablet     Refill:  3        Return in 3 months (on 6/30/2022), or if symptoms worsen or fail to improve, for follow up on depression COPD . Patient Instructions     Personalized Preventive Plan for Harley Delgado Lack - 3/31/2022  Medicare offers a range of preventive health benefits. Some of the tests and screenings are paid in full while other may be subject to a deductible, co-insurance, and/or copay. Some of these benefits include a comprehensive review of your medical history including lifestyle, illnesses that may run in your family, and various assessments and screenings as appropriate. After reviewing your medical record and screening and assessments performed today your provider may have ordered immunizations, labs, imaging, and/or referrals for you. A list of these orders (if applicable) as well as your Preventive Care list are included within your After Visit Summary for your review. Other Preventive Recommendations:    · A preventive eye exam performed by an eye specialist is recommended every 1-2 years to screen for glaucoma; cataracts, macular degeneration, and other eye disorders. · A preventive dental visit is recommended every 6 months. · Try to get at least 150 minutes of exercise per week or 10,000 steps per day on a pedometer . · Order or download the FREE \"Exercise & Physical Activity: Your Everyday Guide\" from The "[employer]" on [place].  Call 1-714.294.3369 or search The [employer] on [address] [place] [address]. · You need 5386-4939 mg of calcium and 1148-3126 IU of vitamin D per day. It is possible to meet your calcium requirement with diet alone, but a vitamin D supplement is usually necessary to meet this goal.  · When exposed to the sun, use a sunscreen that protects against both UVA and UVB radiation with an SPF of 30 or greater. Reapply every 2 to 3 hours or after sweating, drying off with a towel, or swimming. · Always wear a seat belt when traveling in a car. Always wear a helmet when riding a bicycle or motorcycle. Patient Education        Well Visit, Over 72: Care Instructions  Overview     Well visits can help you stay healthy. Your doctor has checked your overall health and may have suggested ways to take good care of yourself. Your doctor also may have recommended tests. At home, you can help prevent illness withhealthy eating, regular exercise, and other steps. Follow-up care is a key part of your treatment and safety. Be sure to make and go to all appointments, and call your doctor if you are having problems. It's also a good idea to know your test results and keep alist of the medicines you take. How can you care for yourself at home? Get screening tests that you and your doctor decide on. Screening helps find diseases before any symptoms appear. Eat healthy foods. Choose fruits, vegetables, whole grains, protein, and low-fat dairy foods. Limit fat, especially saturated fat. Reduce salt in your diet. Limit alcohol. If you are a man, have no more than 2 drinks a day or 14 drinks a week. If you are a woman, have no more than 1 drink a day or 7 drinks a week. Since alcohol affects older adults differently, you may want to limit alcohol even more. Or you may not want to drink at all. Get at least 30 minutes of exercise on most days of the week. Walking is a good choice.  You also may want to do other activities, such as running, swimming, cycling, or playing tennis or team sports. Reach and stay at a healthy weight. This will lower your risk for many problems, such as obesity, diabetes, heart disease, and high blood pressure. Do not smoke. Smoking can make health problems worse. If you need help quitting, talk to your doctor about stop-smoking programs and medicines. These can increase your chances of quitting for good. Care for your mental health. It is easy to get weighed down by worry and stress. Learn strategies to manage stress, like deep breathing and mindfulness, and stay connected with your family and community. If you find you often feel sad or hopeless, talk with your doctor. Treatment can help. Talk to your doctor about whether you have any risk factors for sexually transmitted infections (STIs). You can help prevent STIs if you wait to have sex with a new partner (or partners) until you've each been tested for STIs. It also helps if you use condoms (male or female condoms) and if you limit your sex partners to one person who only has sex with you. Vaccines are available for some STIs. If you think you may have a problem with alcohol or drug use, talk to your doctor. This includes prescription medicines (such as amphetamines and opioids) and illegal drugs (such as cocaine and methamphetamine). Your doctor can help you figure out what type of treatment is best for you. Protect your skin from too much sun. When you're outdoors from 10 a.m. to 4 p.m., stay in the shade or cover up with clothing and a hat with a wide brim. Wear sunglasses that block UV rays. Even when it's cloudy, put broad-spectrum sunscreen (SPF 30 or higher) on any exposed skin. See a dentist one or two times a year for checkups and to have your teeth cleaned. Wear a seat belt in the car. When should you call for help? Watch closely for changes in your health, and be sure to contact your doctor if you have any problems or symptoms that concern you.   Where can you learn more? Go to https://chpepiceweb.healthWinners Circle Gaming ([employer] org and sign in to your P[employer] Call account. Enter Z632 in the [facility] [facility] box to learn more about \"Well Visit, Over 65: Care Instructions. \"     If you do not have an account, please click on the \"Sign Up Now\" link. Current as of: October 6, 2021               Content Version: 13.2  © 2006-2022 [employer]. Care instructions adapted under license by [facility] ([facility]). If you have questions about a medical condition or this instruction, always ask your healthcare professional. [place] [address] any warranty or liability for your use of this information. Patient Education        Learning About Living Giselle Haque  What is a living will? A living will, also called a declaration, is a legal form. It tells your family and your doctor your wishes when you can't speak for yourself. It's used by the health professionals who will treat you as you near the end of your life or ifyou get seriously hurt or ill. If you put your wishes in writing, your loved ones and others will know what kind of care you want. They won't need to guess. This can ease your mind and behelpful to others. And you can change or cancel your living will at any time. A living will is not the same as an estate or property will. An estate willexplains what you want to happen with your money and property after you die. How do you use it? Keep these facts in mind about how a living will is used. Your living will is used only if you can't speak or make decisions for yourself. Most often, one or more doctors must certify that you can't speak or decide for yourself before your living will takes effect. If you get better and can speak for yourself again, you can accept or refuse any treatment. It doesn't matter what you said in your living will. Some states may limit your right to refuse treatment in certain cases.  For example, you may need to clearly state in your living will that you don't want artificial hydration and nutrition, such as being fed through a tube. Is a living will a legal document? A living will is a legal document. Each state has its own laws about livingwills. And a living will may be called something else in your state. Here are some things to know about living [place]. You don't need an  to complete a living will. But legal advice can be helpful if your state's laws are unclear. It can also help if your health history is complicated or your family can't agree on what should be in your living will. You can change your living will at any time. Some people find that their wishes about end-of-life care change as their health changes. If you make big changes to your living will, complete a new form. If you move to another state, make sure that your living will is legal in the state where you now live. In most cases, doctors will respect your wishes even if you have a form from a different state. You might use a universal form that has been approved by many states. This kind of form can sometimes be filled out and stored online. Your digital copy will then be available wherever you have a connection to the internet. The doctors and nurses who need to treat you can find it right away. Your state may offer an online registry. This is another place where you can store your living will online. It's a good idea to get your living will notarized. This means using a person called a  to watch two people sign, or witness, your living will. What should you know when you create a living will? Here are some questions to ask yourself as you make your living will. Do you know enough about life support methods that might be used? If not, talk to your doctor so you know what might be done if you can't breathe on your own, your heart stops, or you can't swallow.   What things would you still want to be able to do after you receive life-support methods? Would you want to be able to walk? To speak? To eat on your own? To live without the help of machines? Do you want certain Adventist practices performed if you become very ill? If you have a choice, where do you want to be cared for? In your home? At a hospital or nursing home? If you have a choice at the end of your life, where would you prefer to die? At home? In a hospital or nursing home? Somewhere else? Would you prefer to be buried or cremated? Do you want your organs to be donated after you die? What should you do with your living will? Make sure that your family members and your health care agent have copies of your living will (also called a declaration). Give your doctor a copy of your living will. Ask to have it kept as part of your medical record. If you have more than one doctor, make sure that each one has a copy. Put a copy of your living will where it can be easily found. For example, some people may put a copy on their refrigerator door. If you are using a digital copy, be sure your doctor, family members, and health care agent know how to find and access it. Where can you learn more? Go to https://chpepiceweb.[employer] org and sign in to your Tynt account. Enter W610 in the [employer] box to learn more about \"Learning About Living [place]. \"     If you do not have an account, please click on the \"Sign Up Now\" link. Current as of: October 18, 2021               Content Version: 13.2  © 6966-7114 Healthwise, Inc[employer]. Care instructions adapted under license by [facility] ([facility]). If you have questions about a medical condition or this instruction, always ask your healthcare professional. [place] [address] any warranty or liability for your use of this information. Patient Education        Advance Directives: Care Instructions  Overview  An advance directive is a legal way to state your wishes at the end of your life.  It tells your family and your doctor what to do if you can't say what youwant. There are two main types of advance directives. You can change them any timeyour wishes change. Living will. This form tells your family and your doctor your wishes about life support and other treatment. The form is also called a declaration. Medical power of . This form lets you name a person to make treatment decisions for you when you can't speak for yourself. This person is called a health care agent (health care proxy, health care surrogate). The form is also called a durable power of  for health care. If you do not have an advance directive, decisions about your medical care maybe made by a family member, or by a doctor or a  who doesn't know you. It may help to think of an advance directive as a gift to the people who carefor you. If you have one, they won't have to make tough decisions by themselves. Follow-up care is a key part of your treatment and safety. Be sure to make and go to all appointments, and call your doctor if you are having problems. It's also a good idea to know your test results and keep alist of the medicines you take. What should you include in an advance directive? Many states have a unique advance directive form. (It may ask you to address specific issues.) Or you might use a universal form that's approved by manystates. If your form doesn't tell you what to address, it may be hard to know what to include in your advance directive. Use the questions below to help you getstarted. Who do you want to make decisions about your medical care if you are not able to? What life-support measures do you want if you have a serious illness that gets worse over time or can't be cured? What are you most afraid of that might happen? (Maybe you're afraid of having pain, losing your independence, or being kept alive by machines.)  Where would you prefer to die? (Your home?  A hospital? A nursing home?)  Do you want to donate your organs when you die? Do you want certain Mu-ism practices performed before you die? When should you call for help? Be sure to contact your doctor if you have any questions. Where can you learn more? Go to [email or web address] and sign in to your [employer] account. Enter R264 in the [facility] box to learn more about \"Advance Directives: Care Instructions. \"     If you do not have an account, please click on the \"Sign Up Now\" link. Current as of: October 18, 2021               Content Version: 13.2  © 5577-5546 Healthwise, [employer]. Care instructions adapted under license by [facility] ([facility]). If you have questions about a medical condition or this instruction, always ask your healthcare professional. [address] any warranty or liability for your use of this information. Medicare Annual Wellness Visit    Eduarda Spencer is here for Medicare AWV    Assessment & Plan   Welcome to Medicare preventive visit  COPD exacerbation ([facility] [address]  -     levoFLOXacin (LEVAQUIN) 500 MG tablet; Take 1 tablet by mouth daily for 10 days, Disp-10 tablet, R-0Normal  Sarcoid  Venous ulcer of right leg (HCC)  Cardiac arrest (HCC)  Keratoconjunctivitis sicca of both eyes (HCC)  Pre-diabetes  Acquired hypothyroidism  Graves disease  Reactive depression  -     ARIPiprazole (ABILIFY) 5 MG tablet; Take 1 tablet by mouth daily, Disp-30 tablet, R-3Normal  Anxiety and depression  Primary insomnia  Encounter for screening mammogram for malignant neoplasm of breast  -     JORGE A DIGITAL SCREEN W OR WO CAD BILATERAL; Future      Recommendations for Preventive Services Due: see orders and patient instructions/AVS.  Recommended screening schedule for the next 5-10 years is provided to the patient in written form: see Patient Instructions/AVS.     Return in 3 months (on 6/30/2022), or if symptoms worsen or fail to improve, for follow up on depression COPD . Subjective   The following acute and/or chronic problems were also addressed today:copd exacerbation    Patient's complete Health Risk Assessment and screening values have been reviewed and are found in Flowsheets. The following problems were reviewed today and where indicated follow up appointments were made and/or referrals ordered. Positive Risk Factor Screenings with Interventions:               General Health and ACP:  General  In general, how would you say your health is?: Good  In the past 7 days, have you experienced any of the following: New or Increased Pain, New or Increased Fatigue, Loneliness, Social Isolation, Stress or Anger?: (!) Yes (stress)  Select all that apply: (!) Stress  Do you get the social and emotional support that you need?: Yes  Do you have a Living Will?: (!) No    Advance Directives     Power of  Living Will ACP-Advance Directive ACP-Power of     Not on File Not on File Not on File Not on File      General Health Risk Interventions:  · No Living Will: Advance Care Planning addressed with patient today    Health Habits/Nutrition:     Physical Activity: Insufficiently Active    Days of Exercise per Week: 3 days    Minutes of Exercise per Session: 20 min     Have you lost any weight without trying in the past 3 months?: (!) Yes  Body mass index: (!) 34.31  Have you seen the dentist within the past year?: (!) No    Health Habits/Nutrition Interventions:  · Inadequate physical activity:  Increase activity     To see the dentist         Objective   Vitals:    03/31/22 1338   BP: 126/68   Site: Left Upper Arm   Position: Sitting   Cuff Size: Medium Adult   Pulse: 79   Resp: 16   Temp: 97.4 °F (36.3 °C)   TempSrc: Oral   SpO2: 93%   Weight: 181 lb 9.6 oz (82.4 kg)   Height: 5' 1\" (1.549 m)      Body mass index is 34.31 kg/m².         See physical       Allergies   Allergen Reactions    Atarax [Hydroxyzine] Hives    Codeine Other (See Comments)     Makes her feel hyper  Sulfa Antibiotics Nausea Only    Zithromax [Azithromycin]      Itching and burning     Prior to Visit Medications    Medication Sig Taking? Authorizing Provider   levoFLOXacin (LEVAQUIN) 500 MG tablet Take 1 tablet by mouth daily for 10 days Yes Debbie Marcano MD   ARIPiprazole (ABILIFY) 5 MG tablet Take 1 tablet by mouth daily Yes Debbie Marcano MD   zolpidem (AMBIEN) 10 MG tablet Take 1 tablet by mouth nightly as needed for Sleep for up to 90 days.  Yes Debbie Marcano MD   levothyroxine (SYNTHROID) 88 MCG tablet TAKE 1 TABLET EVERY DAY Yes Debbie Marcano MD   trimethoprim-polymyxin b (POLYTRIM) 89802-7.1 UNIT/ML-% ophthalmic solution Place 1 drop into the right eye 3 times daily Yes Historical Provider, MD   dilTIAZem (DILACOR XR) 180 MG extended release capsule Take 180 mg by mouth daily Yes Historical Provider, MD   fluticasone-salmeterol (ADVAIR) 500-50 MCG/DOSE diskus inhaler Inhale 1 puff into the lungs 2 times daily Yes Historical Provider, MD   alendronate (FOSAMAX) 70 MG tablet Take 70 mg by mouth every 7 days On saturday Yes Historical Provider, MD   meclizine (ANTIVERT) 12.5 MG tablet Take 12.5 mg by mouth every morning Yes Historical Provider, MD   omeprazole (PRILOSEC) 20 MG delayed release capsule Take 20 mg by mouth daily Yes Historical Provider, MD   Respiratory Therapy Supplies (NEBULIZER/TUBING/MOUTHPIECE) KIT Use Daily Every 4 Hours As Needed DX J44.1 Yes Debbie Marcano MD   methotrexate (RHEUMATREX) 2.5 MG chemo tablet Take 25 mg by mouth once a week On wednesday Yes Historical Provider, MD   folic acid (FOLVITE) 1 MG tablet Take 1 mg by mouth daily Yes Historical Provider, MD   aspirin EC 81 MG EC tablet Take 1 tablet by mouth daily Yes Debbie Marcano MD   metoprolol tartrate (LOPRESSOR) 25 MG tablet 25 mg 2 times daily  Yes Historical Provider, MD   RESTASIS 0.05 % ophthalmic emulsion Place 1 drop into both eyes 2 times daily  Yes Historical Provider, MD   Multiple Vitamins-Minerals (CENTRUM ADULTS PO) Take by mouth daily Yes Historical Provider, MD   tiotropium (SPIRIVA RESPIMAT) 2.5 MCG/ACT AERS inhaler Inhale 2.5 mcg into the lungs daily  Yes Historical Provider, MD   Roflumilast (DALIRESP) 500 MCG tablet Take 500 mcg by mouth daily Yes Puneet Bauer MD   albuterol (PROVENTIL;VENTOLIN) 90 MCG/ACT inhaler Inhale 2 puffs into the lungs every 6 hours as needed. Yes Historical Provider, MD   ipratropium-albuterol (DUONEB) 0.5-2.5 (3) MG/3ML SOLN nebulizer solution Inhale 1 vial into the lungs every 4 hours. Yes Historical Provider, MD   Montelukast Sodium (SINGULAIR PO) Take 10 mg by mouth nightly  Yes Historical Provider, MD   temazepam (RESTORIL) 15 MG capsule Take 1 capsule by mouth nightly as needed for Sleep for up to 10 days. Puneet Bauer MD   Suvorexant (BELSOMRA) 10 MG TABS Take 20 mg by mouth at bedtime for 90 days.   Puneet Bauer MD   albuterol sulfate HFA (VENTOLIN HFA) 108 (90 Base) MCG/ACT inhaler Inhale 2 puffs into the lungs 4 times daily as needed for Wheezing  CHOLO Murphy NP   furosemide (LASIX) 20 MG tablet Take 1 tablet by mouth once daily  CHOLO Murphy NP       CareTeam (Including outside providers/suppliers regularly involved in providing care):   Patient Care Team:  Puneet Bauer MD as PCP - Zuri Draper MD as PCP - [facility] Provider    Reviewed and updated this visit:  Tobacco  Allergies  Meds  Med Hx  Surg Hx  Soc Hx  Fam Hx

## 2022-04-01 ENCOUNTER — TELEPHONE (OUTPATIENT)
Dept: FAMILY MEDICINE CLINIC | Age: 72
End: 2022-04-01

## 2022-04-01 RX ORDER — FUROSEMIDE 20 MG/1
TABLET ORAL
Qty: 60 TABLET | Refills: 0 | Status: SHIPPED | OUTPATIENT
Start: 2022-04-01 | End: 2022-06-09

## 2022-04-01 NOTE — TELEPHONE ENCOUNTER
[address] is calling about 2 meds. Suvorexant (BELSOMRA) 10 MG TABS     Pt is also on Ambien from her mail order - need to know if the Belsomra is replacing the Ambien? ARIPiprazole (ABILIFY) 5 MG tablet     Pt is also on Cymbalta and it comes up as a drug to drug interaction - need to know if Dr. Maylin Mauricio has pt on both meds?     Pl advise   [facility]     [phone]

## 2022-04-01 NOTE — TELEPHONE ENCOUNTER
Spoke with pharmacy. Pt continuing Ambien until PA is approved for belsomra.  Pt is not taking cymbalta, she started the abilify

## 2022-04-04 ENCOUNTER — TELEPHONE (OUTPATIENT)
Dept: FAMILY MEDICINE CLINIC | Age: 72
End: 2022-04-04

## 2022-04-04 NOTE — TELEPHONE ENCOUNTER
Patient was last seen 3/31/22 and 2 of her medications were change. She is not sure if its the Suvorexant or Aripiprazole, but one of them is keeping her up all night, she is only get 3 hours of sleep.       Please call, [phone]

## 2022-04-05 ENCOUNTER — TELEPHONE (OUTPATIENT)
Dept: FAMILY MEDICINE CLINIC | Age: 72
End: 2022-04-05

## 2022-04-05 RX ORDER — ALBUTEROL SULFATE 90 UG/1
2 AEROSOL, METERED RESPIRATORY (INHALATION) 4 TIMES DAILY PRN
Qty: 18 G | Refills: 1 | Status: SHIPPED | OUTPATIENT
Start: 2022-04-05 | End: 2022-05-02

## 2022-04-05 NOTE — TELEPHONE ENCOUNTER
Pt is calling for a new rx for an albuterol inhaler to be sent to   [facility]     She hasnt had while in a long time.

## 2022-04-07 ENCOUNTER — TELEPHONE (OUTPATIENT)
Dept: FAMILY MEDICINE CLINIC | Age: 72
End: 2022-04-07

## 2022-04-07 DIAGNOSIS — F51.01 PRIMARY INSOMNIA: Primary | ICD-10-CM

## 2022-04-07 RX ORDER — SUVOREXANT 10 MG/1
20 TABLET, FILM COATED ORAL NIGHTLY
Qty: 30 TABLET | Refills: 2 | Status: SHIPPED
Start: 2022-04-07 | End: 2022-04-20

## 2022-04-07 RX ORDER — TEMAZEPAM 15 MG/1
15 CAPSULE ORAL NIGHTLY PRN
Qty: 10 CAPSULE | Refills: 0 | Status: SHIPPED | OUTPATIENT
Start: 2022-04-07 | End: 2022-04-21

## 2022-04-07 NOTE — TELEPHONE ENCOUNTER
Received a fax from [address] [place] [address] about the Restoril 15 mg #10 that was sent in today. They are asking if you d/c the Belsomra. I let the pharmacist know that pt was instructed NOT to take the Restroil right now and to only take if really needs, we told the pt she needs to give the [address] more time. OK to fill restoril for pt to have on hand, per Dr Januzs Escobedo.

## 2022-04-07 NOTE — TELEPHONE ENCOUNTER
Pt stated that she has been on the 2 new meds that Dr. Nicole Saldana prescribed now for 1 week and she is not getting any sleep. She is only getting only 3 hours of sleep a night.      Pl advise   [phone] (home)

## 2022-04-07 NOTE — TELEPHONE ENCOUNTER
Abilify is for depression  Not sure if helping this but will take time  I would increase belsomra to 20 mg and see if it helps  But will take time  I will call in a proscription to take if really needs  Not to take nightly now

## 2022-04-20 NOTE — TELEPHONE ENCOUNTER
Pt stated that the Alejandro Ravel is not working for her, she is still only getting about 4 hours of sleep at night.    Pt would like to go back on the Ambien    Pl advise [phone] (home)

## 2022-04-20 NOTE — TELEPHONE ENCOUNTER
Patient said she have Ambien medication left and patient said she should be okay till June on medication .

## 2022-04-21 NOTE — TELEPHONE ENCOUNTER
[address] and discontinued Belsomra and Restoril. [facility]

## 2022-04-25 DIAGNOSIS — F32.9 REACTIVE DEPRESSION: ICD-10-CM

## 2022-04-25 RX ORDER — ARIPIPRAZOLE 5 MG/1
5 TABLET ORAL DAILY
Qty: 90 TABLET | Refills: 0 | Status: SHIPPED | OUTPATIENT
Start: 2022-04-25 | End: 2022-07-06

## 2022-04-25 NOTE — TELEPHONE ENCOUNTER
----- Message from Vonnie Thor sent at 4/22/2022  5:19 PM EDT -----  Subject: Refill Request    QUESTIONS  Name of Medication? ARIPiprazole (ABILIFY) 5 MG tablet  Patient-reported dosage and instructions? 5 MG tablet once daily  How many days do you have left? Unknown  Preferred Pharmacy? [facility] phone number (if available)? [phone]  ---------------------------------------------------------------------------  --------------  CALL BACK INFO  What is the best way for the office to contact you? OK to leave message on   voicemail  Preferred Call Back Phone Number? [phone]  ---------------------------------------------------------------------------  --------------  SCRIPT ANSWERS  Relationship to Patient? Third Party  Third Party Type? Insurance? Representative Name?  [employer]

## 2022-04-28 ENCOUNTER — TELEPHONE (OUTPATIENT)
Dept: FAMILY MEDICINE CLINIC | Age: 72
End: 2022-04-28

## 2022-04-28 DIAGNOSIS — R42 VERTIGO: ICD-10-CM

## 2022-04-28 RX ORDER — DOXYCYCLINE HYCLATE 100 MG
100 TABLET ORAL 2 TIMES DAILY
Qty: 20 TABLET | Refills: 0 | Status: SHIPPED | OUTPATIENT
Start: 2022-04-28 | End: 2022-05-08

## 2022-04-28 RX ORDER — DOXYCYCLINE HYCLATE 100 MG
100 TABLET ORAL 2 TIMES DAILY
Qty: 20 TABLET | Refills: 0 | Status: CANCELLED | OUTPATIENT
Start: 2022-04-28 | End: 2022-05-08

## 2022-04-28 RX ORDER — MECLIZINE HCL 12.5 MG/1
12.5 TABLET ORAL 3 TIMES DAILY PRN
Qty: 30 TABLET | Refills: 1 | Status: CANCELLED | OUTPATIENT
Start: 2022-04-28 | End: 2022-05-18

## 2022-04-28 RX ORDER — MECLIZINE HCL 12.5 MG/1
12.5 TABLET ORAL 3 TIMES DAILY PRN
Qty: 30 TABLET | Refills: 1 | Status: SHIPPED | OUTPATIENT
Start: 2022-04-28

## 2022-04-28 RX ORDER — MECLIZINE HCL 12.5 MG/1
12.5 TABLET ORAL EVERY MORNING
Status: CANCELLED | OUTPATIENT
Start: 2022-04-28

## 2022-04-28 NOTE — TELEPHONE ENCOUNTER
Patient does not need steroids at this time, but will call if needs them.    She is asking for Antivert to be sent to pharmacy, please !!

## 2022-04-28 NOTE — TELEPHONE ENCOUNTER
Patient thinks she has bronchitis or copd, she is wheezing and using her nebulizer, no fever, started 2 days ago. Patient did not want to make appt, feels to bad to drive. She is asking if you can call in something for her.       Please call,  [phone]

## 2022-04-28 NOTE — TELEPHONE ENCOUNTER
Lm for pt doxycycline sent in, call us if she needs a steroid and if sx's not improving needs visit.

## 2022-05-02 RX ORDER — ALBUTEROL SULFATE 90 UG/1
2 AEROSOL, METERED RESPIRATORY (INHALATION) 4 TIMES DAILY PRN
Qty: 1 EACH | Refills: 0 | Status: SHIPPED | OUTPATIENT
Start: 2022-05-02

## 2022-05-09 ENCOUNTER — TELEPHONE (OUTPATIENT)
Dept: FAMILY MEDICINE CLINIC | Age: 72
End: 2022-05-09

## 2022-05-09 DIAGNOSIS — H92.01 RIGHT EAR PAIN: ICD-10-CM

## 2022-05-09 DIAGNOSIS — R42 VERTIGO: Primary | ICD-10-CM

## 2022-05-09 RX ORDER — FLUCONAZOLE 100 MG/1
100 TABLET ORAL DAILY
Qty: 7 TABLET | Refills: 0 | Status: SHIPPED | OUTPATIENT
Start: 2022-05-09 | End: 2022-05-16

## 2022-05-09 NOTE — TELEPHONE ENCOUNTER
[address] [place] [address] [facility] [address] [address]   Ph: [phone]

## 2022-05-09 NOTE — TELEPHONE ENCOUNTER
Tried to call the patient and left the detailed message on the voice mail .  name and phone number  to call and scheduled.

## 2022-05-09 NOTE — TELEPHONE ENCOUNTER
Patient finished doxycycline today and  her right ear is still hurting and she is dizzy. She also has a yeast infection from the antibiotics. [facility] [place].       Please [facility],[phone]

## 2022-05-24 DIAGNOSIS — F51.01 PRIMARY INSOMNIA: ICD-10-CM

## 2022-05-24 DIAGNOSIS — H91.90 HEARING LOSS, UNSPECIFIED HEARING LOSS TYPE, UNSPECIFIED LATERALITY: Primary | ICD-10-CM

## 2022-05-24 RX ORDER — ZOLPIDEM TARTRATE 10 MG/1
10 TABLET ORAL NIGHTLY PRN
Qty: 90 TABLET | Refills: 0 | Status: SHIPPED | OUTPATIENT
Start: 2022-05-24 | End: 2022-11-03

## 2022-06-09 ENCOUNTER — NURSE ONLY (OUTPATIENT)
Dept: CARDIOLOGY CLINIC | Age: 72
End: 2022-06-09
Payer: MEDICARE

## 2022-06-09 DIAGNOSIS — I47.20 VT (VENTRICULAR TACHYCARDIA): ICD-10-CM

## 2022-06-09 DIAGNOSIS — I46.9 CARDIAC ARREST (HCC): ICD-10-CM

## 2022-06-09 DIAGNOSIS — E03.9 ACQUIRED HYPOTHYROIDISM: ICD-10-CM

## 2022-06-09 DIAGNOSIS — Z95.810 DUAL ICD (IMPLANTABLE CARDIOVERTER-DEFIBRILLATOR) IN PLACE: ICD-10-CM

## 2022-06-09 DIAGNOSIS — F32.9 REACTIVE DEPRESSION: ICD-10-CM

## 2022-06-09 RX ORDER — LEVOTHYROXINE SODIUM 88 UG/1
TABLET ORAL
Qty: 90 TABLET | Refills: 1 | Status: SHIPPED | OUTPATIENT
Start: 2022-06-09

## 2022-06-09 RX ORDER — FUROSEMIDE 20 MG/1
TABLET ORAL
Qty: 60 TABLET | Refills: 0 | Status: SHIPPED | OUTPATIENT
Start: 2022-06-09

## 2022-06-09 RX ORDER — ARIPIPRAZOLE 5 MG/1
TABLET ORAL
Qty: 90 TABLET | OUTPATIENT
Start: 2022-06-09

## 2022-06-10 PROCEDURE — 93295 DEV INTERROG REMOTE 1/2/MLT: CPT | Performed by: INTERNAL MEDICINE

## 2022-06-10 PROCEDURE — 93296 REM INTERROG EVL PM/IDS: CPT | Performed by: INTERNAL MEDICINE

## 2022-06-10 NOTE — PROGRESS NOTES
Remote transmission received from patient's dual chamber ICD monitor at home. Transmission shows normal sensing and pacing function. Brief AT noted (diltiazem, Lopressor). Ap <1%   1.2%    Corvue is within normal limits with slight decreasing trend noted. EP physician will review. See interrogation under cardiology tab in the [address] [place] [address] [address] field for more details. Will continue to monitor remotely.

## 2022-07-05 DIAGNOSIS — F32.9 REACTIVE DEPRESSION: ICD-10-CM

## 2022-07-06 RX ORDER — ARIPIPRAZOLE 5 MG/1
TABLET ORAL
Qty: 90 TABLET | Refills: 0 | Status: SHIPPED | OUTPATIENT
Start: 2022-07-06 | End: 2022-07-11 | Stop reason: SINTOL

## 2022-07-11 ENCOUNTER — OFFICE VISIT (OUTPATIENT)
Dept: FAMILY MEDICINE CLINIC | Age: 72
End: 2022-07-11
Payer: MEDICARE

## 2022-07-11 VITALS
HEIGHT: 61 IN | OXYGEN SATURATION: 96 % | DIASTOLIC BLOOD PRESSURE: 61 MMHG | BODY MASS INDEX: 32.85 KG/M2 | SYSTOLIC BLOOD PRESSURE: 130 MMHG | WEIGHT: 174 LBS | HEART RATE: 71 BPM | TEMPERATURE: 97.9 F

## 2022-07-11 DIAGNOSIS — D12.6 ADENOMATOUS POLYP OF COLON, UNSPECIFIED PART OF COLON: ICD-10-CM

## 2022-07-11 DIAGNOSIS — F41.9 ANXIETY: Primary | ICD-10-CM

## 2022-07-11 DIAGNOSIS — Z12.31 ENCOUNTER FOR SCREENING MAMMOGRAM FOR MALIGNANT NEOPLASM OF BREAST: ICD-10-CM

## 2022-07-11 DIAGNOSIS — M17.11 PRIMARY OSTEOARTHRITIS OF RIGHT KNEE: ICD-10-CM

## 2022-07-11 DIAGNOSIS — M51.16 LUMBAR DISC DISEASE WITH RADICULOPATHY: ICD-10-CM

## 2022-07-11 DIAGNOSIS — I87.2 EDEMA OF BOTH LOWER EXTREMITIES DUE TO PERIPHERAL VENOUS INSUFFICIENCY: ICD-10-CM

## 2022-07-11 DIAGNOSIS — H60.331 ACUTE SWIMMER'S EAR OF RIGHT SIDE: ICD-10-CM

## 2022-07-11 DIAGNOSIS — D86.89 SARCOIDOSIS OF OTHER SITES: ICD-10-CM

## 2022-07-11 DIAGNOSIS — J44.1 CHRONIC OBSTRUCTIVE PULMONARY DISEASE WITH ACUTE EXACERBATION (HCC): ICD-10-CM

## 2022-07-11 DIAGNOSIS — Z87.891 PERSONAL HISTORY OF TOBACCO USE: ICD-10-CM

## 2022-07-11 PROCEDURE — G8399 PT W/DXA RESULTS DOCUMENT: HCPCS | Performed by: INTERNAL MEDICINE

## 2022-07-11 PROCEDURE — 3023F SPIROM DOC REV: CPT | Performed by: INTERNAL MEDICINE

## 2022-07-11 PROCEDURE — G8417 CALC BMI ABV UP PARAM F/U: HCPCS | Performed by: INTERNAL MEDICINE

## 2022-07-11 PROCEDURE — 99214 OFFICE O/P EST MOD 30 MIN: CPT | Performed by: INTERNAL MEDICINE

## 2022-07-11 PROCEDURE — 1090F PRES/ABSN URINE INCON ASSESS: CPT | Performed by: INTERNAL MEDICINE

## 2022-07-11 PROCEDURE — G0296 VISIT TO DETERM LDCT ELIG: HCPCS | Performed by: INTERNAL MEDICINE

## 2022-07-11 PROCEDURE — 1124F ACP DISCUSS-NO DSCNMKR DOCD: CPT | Performed by: INTERNAL MEDICINE

## 2022-07-11 PROCEDURE — 1036F TOBACCO NON-USER: CPT | Performed by: INTERNAL MEDICINE

## 2022-07-11 PROCEDURE — 3017F COLORECTAL CA SCREEN DOC REV: CPT | Performed by: INTERNAL MEDICINE

## 2022-07-11 PROCEDURE — G8427 DOCREV CUR MEDS BY ELIG CLIN: HCPCS | Performed by: INTERNAL MEDICINE

## 2022-07-11 PROCEDURE — 4130F TOPICAL PREP RX AOE: CPT | Performed by: INTERNAL MEDICINE

## 2022-07-11 RX ORDER — BUSPIRONE HYDROCHLORIDE 10 MG/1
10 TABLET ORAL 2 TIMES DAILY
Qty: 60 TABLET | Refills: 2 | Status: SHIPPED | OUTPATIENT
Start: 2022-07-11 | End: 2022-10-04 | Stop reason: SDUPTHER

## 2022-07-11 RX ORDER — METHOCARBAMOL 500 MG/1
500 TABLET, FILM COATED ORAL 4 TIMES DAILY
COMMUNITY

## 2022-07-11 RX ORDER — ALPRAZOLAM 0.25 MG/1
0.25 TABLET ORAL 2 TIMES DAILY PRN
Qty: 30 TABLET | Refills: 0 | Status: SHIPPED | OUTPATIENT
Start: 2022-07-11 | End: 2022-09-08

## 2022-07-11 SDOH — ECONOMIC STABILITY: FOOD INSECURITY: WITHIN THE PAST 12 MONTHS, YOU WORRIED THAT YOUR FOOD WOULD RUN OUT BEFORE YOU GOT MONEY TO BUY MORE.: NEVER TRUE

## 2022-07-11 SDOH — ECONOMIC STABILITY: FOOD INSECURITY: WITHIN THE PAST 12 MONTHS, THE FOOD YOU BOUGHT JUST DIDN'T LAST AND YOU DIDN'T HAVE MONEY TO GET MORE.: NEVER TRUE

## 2022-07-11 ASSESSMENT — SOCIAL DETERMINANTS OF HEALTH (SDOH): HOW HARD IS IT FOR YOU TO PAY FOR THE VERY BASICS LIKE FOOD, HOUSING, MEDICAL CARE, AND HEATING?: NOT HARD AT ALL

## 2022-07-11 NOTE — PROGRESS NOTES
SUBJECTIVE:  Anthony Dee is a 73 y.o. female being evaluated for:    Chief Complaint   Patient presents with    3 Month Follow-Up      Patient is here for 3 months followup today . HPI   Eyes are doing better  Lid surgery has helped on the right able to close and doing better   Varicose veins are terrible and wanting to do surgery but planning on doing right total knee  Wound is now gone and swelling has been better  Lost 40 # since   a year ago  No good appetitie   Gets full really quick Not eating and not hungry  Eating small portions and then is full   Real bad back pain and seeing spine doctr at [facility]  Dr Seth Gonzalez   Thoracic ct with arthritis and disc disease  Robaxin has helped   On remicaid for sarcoid in her eyes    Really worried about her anxiety  Getting panic attacks  Nervous and afraid of every thing  if will wake up when she goes to bed  Cannot watch a tv show as up and down with it  Moves legs a lot      Swimming a lot and pain in her right ear  Drops helps  Allergies   Allergen Reactions    Atarax [Hydroxyzine] Hives    Codeine Other (See Comments)     Makes her feel hyper    Sulfa Antibiotics Nausea Only    Zithromax [Azithromycin]      Itching and burning     Current Outpatient Medications   Medication Sig Dispense Refill    methocarbamol (ROBAXIN) 500 MG tablet Take 500 mg by mouth 4 times daily      ALPRAZolam (XANAX) 0.25 MG tablet Take 1 tablet by mouth 2 times daily as needed for Anxiety for up to 15 days. 30 tablet 0    busPIRone (BUSPAR) 10 MG tablet Take 1 tablet by mouth in the morning and at bedtime 60 tablet 2    furosemide (LASIX) 20 MG tablet Take 1 tablet by mouth once daily 60 tablet 0    levothyroxine (SYNTHROID) 88 MCG tablet TAKE 1 TABLET EVERY DAY 90 tablet 1    zolpidem (AMBIEN) 10 MG tablet Take 1 tablet by mouth nightly as needed for Sleep for up to 90 days.  90 tablet 0    albuterol sulfate  (90 Base) MCG/ACT inhaler INHALE 2 PUFFS INTO THE LUNGS 4 TIMES DAILY AS NEEDED FOR WHEEZING 1 each 0    meclizine (ANTIVERT) 12.5 MG tablet Take 1 tablet by mouth 3 times daily as needed for Dizziness 30 tablet 1    trimethoprim-polymyxin b (POLYTRIM) 29365-3.1 UNIT/ML-% ophthalmic solution Place 1 drop into the right eye 3 times daily      dilTIAZem (DILACOR XR) 180 MG extended release capsule Take 180 mg by mouth daily      fluticasone-salmeterol (ADVAIR) 500-50 MCG/DOSE diskus inhaler Inhale 1 puff into the lungs 2 times daily      alendronate (FOSAMAX) 70 MG tablet Take 70 mg by mouth every 7 days On saturday      omeprazole (PRILOSEC) 20 MG delayed release capsule Take 20 mg by mouth daily      Respiratory Therapy Supplies (NEBULIZER/TUBING/MOUTHPIECE) KIT Use Daily Every 4 Hours As Needed DX J44.1 1 kit 0    methotrexate (RHEUMATREX) 2.5 MG chemo tablet Take 25 mg by mouth once a week On wednesday      folic acid (FOLVITE) 1 MG tablet Take 1 mg by mouth daily      aspirin EC 81 MG EC tablet Take 1 tablet by mouth daily 30 tablet 0    RESTASIS 0.05 % ophthalmic emulsion Place 1 drop into both eyes 2 times daily       Multiple Vitamins-Minerals (CENTRUM ADULTS PO) Take by mouth daily      tiotropium (SPIRIVA RESPIMAT) 2.5 MCG/ACT AERS inhaler Inhale 2.5 mcg into the lungs daily       Roflumilast (DALIRESP) 500 MCG tablet Take 500 mcg by mouth daily 30 tablet 3    albuterol (PROVENTIL;VENTOLIN) 90 MCG/ACT inhaler Inhale 2 puffs into the lungs every 6 hours as needed. ipratropium-albuterol (DUONEB) 0.5-2.5 (3) MG/3ML SOLN nebulizer solution Inhale 1 vial into the lungs every 4 hours. Montelukast Sodium (SINGULAIR PO) Take 10 mg by mouth nightly       metoprolol tartrate (LOPRESSOR) 25 MG tablet 25 mg 2 times daily        No current facility-administered medications for this visit. Social History     Socioeconomic History    Marital status:       Spouse name: Not on file    Number of children: Not on file    Years of education: Not on file    Highest education level: Not on file   Occupational History    Not on file   Tobacco Use    Smoking status: Former     Packs/day: 1.50     Years: 40.00     Pack years: 60.00     Types: Cigarettes     Quit date: 2015     Years since quittin.5    Smokeless tobacco: Never   Vaping Use    Vaping Use: Never used   Substance and Sexual Activity    Alcohol use: No    Drug use: No    Sexual activity: Yes     Partners: Male   Other Topics Concern    Not on file   Social History Narrative    Not on file     Social Determinants of Health     Financial Resource Strain: Low Risk     Difficulty of Paying Living Expenses: Not hard at all   Food Insecurity: No Food Insecurity    Worried About [address] in the Last Year: Never true    [address] in the Last Year: Never true   Transportation Needs: No Transportation Needs    Lack of Transportation (Medical): No    Lack of Transportation (Non-Medical):  No   Physical Activity: Insufficiently Active    Days of Exercise per Week: 3 days    Minutes of Exercise per Session: 20 min   Stress: Not on file   Social Connections: Not on file   Intimate Partner Violence: Not on file   Housing Stability: Not on file      Past Medical History:   Diagnosis Date    Adrenal adenoma     left    Allergic rhinitis     Asthma     Cervical disc disorder with radiculopathy     COPD (chronic obstructive pulmonary disease) ([facility] [address]     Deviated nasal septum     s/p septoplasty    Diverticulosis     h/o abscess  s/p colon resection  Dr. 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      Comments: Sclera red but better   Neck:      Vascular: No carotid bruit. Cardiovascular:      Rate and Rhythm: Normal rate and regular rhythm. Pulses: Normal pulses. Heart sounds: Normal heart sounds. Comments: Varicose veins   Pulmonary:      Effort: Pulmonary effort is normal.      Breath sounds: Normal breath sounds. No wheezing. Comments: Decreased bs  Chest:      Chest wall: No tenderness. Abdominal:      General: There is no distension. Palpations: Abdomen is soft. Tenderness: There is no abdominal tenderness. Musculoskeletal:      Cervical back: Neck supple. Right lower leg: Edema present. Left lower leg: Edema present. Comments: tr   Lymphadenopathy:      Cervical: No cervical adenopathy. Skin:     General: Skin is warm and dry. Findings: No erythema or lesion (right shin wound healed). Neurological:      General: No focal deficit present. Mental Status: She is alert. Gait: Gait normal.   Psychiatric:         Behavior: Behavior normal.         Thought Content: Thought content normal.      Comments: Anxious        ASSESSMENT/PLAN:    Judy Henderson was seen today for 3 month follow-up. Diagnoses and all orders for this visit:    Anxiety  -     ALPRAZolam (XANAX) 0.25 MG tablet; Take 1 tablet by mouth 2 times daily as needed for Anxiety for up to 15 days. 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 and [facility]  Comments:  eye    Chronic obstructive pulmonary disease follows with Dr Caden Grewal  stable    Lumbar disc disease with radiculopathy following with ortho cincy     Primary osteoarthritis of right knee planning knee replacement with ortho cincy      chronic insomnia on ambien and oarrs was reviewed     Adenomatous polyp of colon, unspecified part of colon multiple polyps and due for repeat scope     Encounter for screening mammogram for malignant neoplasm of breast  -     JORGE A DIGITAL SCREEN W OR WO CAD BILATERAL; Future    Personal history of tobacco use  -     SC VISIT TO DISCUSS LUNG CA SCREEN W LDCT  -     CT Lung Screen (Annual); Future      Orders Placed This Encounter   Medications    ALPRAZolam (XANAX) 0.25 MG tablet     Sig: Take 1 tablet by mouth 2 times daily as needed for Anxiety for up to 15 days. Dispense:  30 tablet     Refill:  0    busPIRone (BUSPAR) 10 MG tablet     Sig: Take 1 tablet by mouth in the morning and at bedtime     Dispense:  60 tablet     Refill:  2        Return in about 3 months (around 10/11/2022), or if symptoms worsen or fail to improve, for anxiety and weight loss . Patient Instructions     What is lung cancer screening? Lung cancer screening is a way in which doctors check the lungs for early signs of cancer in people who have no symptoms of lung cancer. A low-dose CT scan uses much less radiation than a normal CT scan and shows a more detailed image of the lungs than a standard X-ray. The goal of lung cancer screening is to find cancer early, before it has a chance to grow, spread, or cause problems. One large study found that smokers who were screened with low-dose CT scans were less likely to die of lung cancer than those who were screened with standard X-ray. Below is a summary of the things you need to know regarding screening for lung cancer with low-dose computed tomography (LDCT). This is a screening program that involves routine annual screening with LDCT studies of the lung. The LDCTs are done using low-dose radiation that is not thought to increase your cancer risk.   If you have other serious medical conditions (other cancers, congestive heart failure) that limit your life expectancy to less than 10 years, you should not undergo lung cancer screening with LDCT. The chance is 20%-60% that the LDCT result will show abnormalities. This would require additional testing which could include repeat imaging or even invasive procedures. Most (about 95%) of \"abnormal\" LDCT results are false in the sense that no lung cancer is ultimately found. Additionally, some (about 10%) of the cancers found would not affect your life expectancy, even if undetected and untreated. If you are still smoking, the single most important thing that you can do to reduce your risk of dying of lung cancer is to quit. For this screening to be covered by Medicare and most other insurers, strict criteria must be met. If you do not meet these criteria, but still wish to undergo LDCT testing, you will be required to sign a waiver indicating your willingness to pay for the scan. Low Dose CT (LDCT) Lung Screening criteria met:     Age 50-77(Medicare) or 50-80 ([facility])   Pack year smoking >20   Still smoking or less than 15 year since quit   No sign or symptoms of lung cancer   > 11 months since last LDCT     Risks and benefits of lung cancer screening with LDCT scans discussed:    Significance of positive screen - False-positive LDCT results often occur. 95% of all positive results do not lead to a diagnosis of cancer. Usually further imaging can resolve most false-positive results; however, some patients may require invasive procedures. Over diagnosis risk - 10% to 12% of screen-detected lung cancer cases are over diagnosed--that is, the cancer would not have been detected in the patient's lifetime without the screening. Need for follow up screens annually to continue lung cancer screening effectiveness     Risks associated with radiation from annual LDCT- Radiation exposure is about the same as for a mammogram, which is about 1/3 of the annual background radiation exposure from everyday life.   Starting screening at age 54 is not likely to increase cancer risk from radiation exposure. Patients with comorbidities resulting in life expectancy of < 10 years, or that would preclude treatment of an abnormality identified on CT, should not be screened due to lack of benefit.     To obtain maximal benefit from this screening, smoking cessation and long-term abstinence from smoking is critical

## 2022-07-15 ENCOUNTER — TELEPHONE (OUTPATIENT)
Dept: FAMILY MEDICINE CLINIC | Age: 72
End: 2022-07-15

## 2022-07-15 DIAGNOSIS — D12.6 ADENOMATOUS POLYP OF COLON, UNSPECIFIED PART OF COLON: Primary | ICD-10-CM

## 2022-07-15 DIAGNOSIS — R63.4 WEIGHT LOSS, NON-INTENTIONAL: ICD-10-CM

## 2022-07-15 ASSESSMENT — ENCOUNTER SYMPTOMS
VOMITING: 0
EYE DISCHARGE: 1
ABDOMINAL PAIN: 0
DIARRHEA: 0
EYE REDNESS: 1
SHORTNESS OF BREATH: 0
BLOOD IN STOOL: 0
COUGH: 0
SINUS PRESSURE: 0
NAUSEA: 0
WHEEZING: 0

## 2022-07-18 NOTE — TELEPHONE ENCOUNTER
[facility] [phone] and they have no GI Dr's that go to [place]. Called Dr Corinna Levy [phone] Dr Corinna Levy does ONLY does procedures at [facility] no office.

## 2022-07-18 NOTE — TELEPHONE ENCOUNTER
I will put in a referral for Dr Mack Rodriguez for colon polyps and weight loss Let her know about below

## 2022-08-04 RX ORDER — DILTIAZEM HYDROCHLORIDE 180 MG/1
180 CAPSULE, EXTENDED RELEASE ORAL DAILY
Qty: 30 CAPSULE | Refills: 0 | Status: SHIPPED | OUTPATIENT
Start: 2022-08-04 | End: 2022-08-31 | Stop reason: SDUPTHER

## 2022-08-31 ENCOUNTER — TELEPHONE (OUTPATIENT)
Dept: FAMILY MEDICINE CLINIC | Age: 72
End: 2022-08-31

## 2022-08-31 RX ORDER — DILTIAZEM HYDROCHLORIDE 180 MG/1
180 CAPSULE, EXTENDED RELEASE ORAL DAILY
Qty: 90 CAPSULE | Refills: 1 | Status: SHIPPED | OUTPATIENT
Start: 2022-08-31

## 2022-08-31 RX ORDER — DILTIAZEM HYDROCHLORIDE 180 MG/1
CAPSULE, EXTENDED RELEASE ORAL
Qty: 30 CAPSULE | OUTPATIENT
Start: 2022-08-31

## 2022-08-31 NOTE — TELEPHONE ENCOUNTER
Pt calling needs a 90 day supply sent over on dilTIAZem (DILACOR XR) 180 MG extended release capsule instead of 30 day.     [address] Telephone Encounter by Desi Berry at 11/30/18 09:54 AM     Author:  Desi Berry Service:  (none) Author Type:  Patient      Filed:  11/30/18 09:55 AM Encounter Date:  11/30/2018 Status:  Signed     :  Desi Berry (Patient )            Received results from [email or web address]. Placed in [facility] [place] [place] mailbox for review.[KF1.1M]       Revision History        User Key Date/Time User Provider Type Action    > KF1.1 11/30/18 09:55 AM Desi Berry Patient  Sign    M - Manual

## 2022-09-04 NOTE — TELEPHONE ENCOUNTER
I have reviewed discharge instructions with the patient's daughter . The patient's daughter verbalized understanding. Last office visit =   07/11/2022     Next office visit =  None

## 2022-09-08 DIAGNOSIS — F41.9 ANXIETY: ICD-10-CM

## 2022-09-08 RX ORDER — ALPRAZOLAM 0.25 MG/1
0.25 TABLET ORAL 2 TIMES DAILY PRN
Qty: 30 TABLET | Refills: 1 | Status: SHIPPED | OUTPATIENT
Start: 2022-09-08 | End: 2022-11-07

## 2022-09-28 DIAGNOSIS — R91.8 ABNORMAL CT LUNG SCREENING: Primary | ICD-10-CM

## 2022-09-28 DIAGNOSIS — R91.1 RIGHT UPPER LOBE PULMONARY NODULE: ICD-10-CM

## 2022-10-04 DIAGNOSIS — F41.9 ANXIETY: ICD-10-CM

## 2022-10-04 RX ORDER — BUSPIRONE HYDROCHLORIDE 10 MG/1
10 TABLET ORAL 2 TIMES DAILY
Qty: 60 TABLET | Refills: 2 | Status: SHIPPED | OUTPATIENT
Start: 2022-10-04

## 2022-11-03 DIAGNOSIS — F51.01 PRIMARY INSOMNIA: ICD-10-CM

## 2022-11-03 RX ORDER — ZOLPIDEM TARTRATE 10 MG/1
10 TABLET ORAL NIGHTLY PRN
Qty: 90 TABLET | Refills: 0 | Status: SHIPPED | OUTPATIENT
Start: 2022-11-03 | End: 2023-02-01

## 2022-12-07 DIAGNOSIS — F41.9 ANXIETY: ICD-10-CM

## 2022-12-07 RX ORDER — BUSPIRONE HYDROCHLORIDE 10 MG/1
10 TABLET ORAL 2 TIMES DAILY
Qty: 180 TABLET | Refills: 0 | Status: SHIPPED | OUTPATIENT
Start: 2022-12-07

## 2022-12-13 ENCOUNTER — TELEPHONE (OUTPATIENT)
Dept: FAMILY MEDICINE CLINIC | Age: 72
End: 2022-12-13

## 2022-12-13 NOTE — TELEPHONE ENCOUNTER
----- Message from Christina Christensen sent at 12/13/2022  4:25 PM EST -----  Subject: Appointment Request    Reason for Call: Established Patient Appointment needed: Urgent (Patient   Request) ED Follow Up Visit    QUESTIONS    Reason for appointment request? Available appointments did not meet   patient need     Additional Information for Provider? URGENT? Patient was in urgent care a   week ago with a sore throat, strep negative, but throat is no better after   doxycycline and she has sores in her mouth. She starts radiation for lung   CA on 12/22 and would like to get in ASAP.  Please call to schedule.  ---------------------------------------------------------------------------  --------------  Amarilis Madrigal [facility]  [phone]; OK to leave message on voicemail  ---------------------------------------------------------------------------  --------------  SCRIPT ANSWERS  COVID Screen: Gary Vazquez

## 2022-12-15 ENCOUNTER — OFFICE VISIT (OUTPATIENT)
Dept: FAMILY MEDICINE CLINIC | Age: 72
End: 2022-12-15
Payer: MEDICARE

## 2022-12-15 VITALS
SYSTOLIC BLOOD PRESSURE: 132 MMHG | OXYGEN SATURATION: 95 % | HEIGHT: 61 IN | TEMPERATURE: 97.5 F | BODY MASS INDEX: 31.91 KG/M2 | WEIGHT: 169 LBS | HEART RATE: 69 BPM | DIASTOLIC BLOOD PRESSURE: 70 MMHG

## 2022-12-15 DIAGNOSIS — K13.21 LEUKOPLAKIA OF ORAL CAVITY: Primary | ICD-10-CM

## 2022-12-15 DIAGNOSIS — K08.9 POOR DENTITION: ICD-10-CM

## 2022-12-15 DIAGNOSIS — R74.8 ELEVATED LIVER ENZYMES: ICD-10-CM

## 2022-12-15 DIAGNOSIS — J44.1 CHRONIC OBSTRUCTIVE PULMONARY DISEASE WITH ACUTE EXACERBATION (HCC): ICD-10-CM

## 2022-12-15 DIAGNOSIS — C34.11 MALIGNANT NEOPLASM OF UPPER LOBE OF RIGHT LUNG (HCC): ICD-10-CM

## 2022-12-15 DIAGNOSIS — D86.89 SARCOIDOSIS OF OTHER SITES: ICD-10-CM

## 2022-12-15 PROCEDURE — 3023F SPIROM DOC REV: CPT | Performed by: INTERNAL MEDICINE

## 2022-12-15 PROCEDURE — G8399 PT W/DXA RESULTS DOCUMENT: HCPCS | Performed by: INTERNAL MEDICINE

## 2022-12-15 PROCEDURE — G8484 FLU IMMUNIZE NO ADMIN: HCPCS | Performed by: INTERNAL MEDICINE

## 2022-12-15 PROCEDURE — 3017F COLORECTAL CA SCREEN DOC REV: CPT | Performed by: INTERNAL MEDICINE

## 2022-12-15 PROCEDURE — 90677 PCV20 VACCINE IM: CPT | Performed by: INTERNAL MEDICINE

## 2022-12-15 PROCEDURE — G8427 DOCREV CUR MEDS BY ELIG CLIN: HCPCS | Performed by: INTERNAL MEDICINE

## 2022-12-15 PROCEDURE — 1090F PRES/ABSN URINE INCON ASSESS: CPT | Performed by: INTERNAL MEDICINE

## 2022-12-15 PROCEDURE — 3078F DIAST BP <80 MM HG: CPT | Performed by: INTERNAL MEDICINE

## 2022-12-15 PROCEDURE — 99214 OFFICE O/P EST MOD 30 MIN: CPT | Performed by: INTERNAL MEDICINE

## 2022-12-15 PROCEDURE — 1124F ACP DISCUSS-NO DSCNMKR DOCD: CPT | Performed by: INTERNAL MEDICINE

## 2022-12-15 PROCEDURE — 3074F SYST BP LT 130 MM HG: CPT | Performed by: INTERNAL MEDICINE

## 2022-12-15 PROCEDURE — G0009 ADMIN PNEUMOCOCCAL VACCINE: HCPCS | Performed by: INTERNAL MEDICINE

## 2022-12-15 PROCEDURE — 1036F TOBACCO NON-USER: CPT | Performed by: INTERNAL MEDICINE

## 2022-12-15 PROCEDURE — G8417 CALC BMI ABV UP PARAM F/U: HCPCS | Performed by: INTERNAL MEDICINE

## 2022-12-15 RX ORDER — CEFUROXIME AXETIL 250 MG/1
250 TABLET ORAL 2 TIMES DAILY
Qty: 14 TABLET | Refills: 0 | Status: SHIPPED | OUTPATIENT
Start: 2022-12-15 | End: 2022-12-22

## 2022-12-15 RX ORDER — CHLORHEXIDINE GLUCONATE 0.12 MG/ML
15 RINSE ORAL 2 TIMES DAILY
Qty: 420 ML | Refills: 0 | Status: SHIPPED | OUTPATIENT
Start: 2022-12-15 | End: 2022-12-29

## 2022-12-15 NOTE — PROGRESS NOTES
SUBJECTIVE:  Negrita Dougherty is a 81 y.o. female being evaluated for:    Chief Complaint   Patient presents with    Pharyngitis      Patient is having sore throat and cough . 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 they think they can shrink it   Afraid she would not be able to tolerate a RUL lobectomy and that a wedge resection would not be enough  No change in her coughing wheezing and sob  Pain across her shouler blades feels as if a knife is stabbing her  Takes ipubrofen 800 mg and uses a heating pad and not at all helpful     Sarcoid is doing better  Keeps losing weight  NOt trying  eats what she wants  NO apetite problems  On remicaid  sees rheumatololgist     Last Tuesday bronchoscopy and bx done  Before bronchoscopy sore throat with white spots and went to urgent care  Sore throat and spots on roof of mouth   Fluid in ear put on doxycycline and finished it and is not any better   No heart burn or indigestion  Tried peridex and listereine and chlorseptic and nothing is helping         Allergies   Allergen Reactions    Atarax [Hydroxyzine] Hives    Codeine Other (See Comments)     Makes her feel hyper    Sulfa Antibiotics Nausea Only    Zithromax [Azithromycin]      Itching and burning     Current Outpatient Medications   Medication Sig Dispense Refill    chlorhexidine (PERIDEX) 0.12 % solution Take 15 mLs by mouth 2 times daily for 14 days 420 mL 0    busPIRone (BUSPAR) 10 MG tablet TAKE 1 TABLET BY MOUTH IN THE MORNING AND AT BEDTIME 180 tablet 0    zolpidem (AMBIEN) 10 MG tablet Take 1 tablet by mouth nightly as needed for Sleep for up to 90 days.  90 tablet 0    dilTIAZem (DILACOR XR) 180 MG extended release capsule Take 1 capsule by mouth daily 90 capsule 1    methocarbamol (ROBAXIN) 500 MG tablet Take 500 mg by mouth 4 times daily      furosemide (LASIX) 20 MG tablet Take 1 tablet by mouth once daily 60 tablet 0    levothyroxine (SYNTHROID) 88 MCG tablet TAKE 1 TABLET EVERY DAY 90 tablet 1 albuterol sulfate  (90 Base) MCG/ACT inhaler INHALE 2 PUFFS INTO THE LUNGS 4 TIMES DAILY AS NEEDED FOR WHEEZING 1 each 0    meclizine (ANTIVERT) 12.5 MG tablet Take 1 tablet by mouth 3 times daily as needed for Dizziness 30 tablet 1    trimethoprim-polymyxin b (POLYTRIM) 21069-6.1 UNIT/ML-% ophthalmic solution Place 1 drop into the right eye 3 times daily      fluticasone-salmeterol (ADVAIR) 500-50 MCG/DOSE diskus inhaler Inhale 1 puff into the lungs 2 times daily      alendronate (FOSAMAX) 70 MG tablet Take 70 mg by mouth every 7 days On saturday      omeprazole (PRILOSEC) 20 MG delayed release capsule Take 20 mg by mouth daily      Respiratory Therapy Supplies (NEBULIZER/TUBING/MOUTHPIECE) KIT Use Daily Every 4 Hours As Needed DX J44.1 1 kit 0    methotrexate (RHEUMATREX) 2.5 MG chemo tablet Take 25 mg by mouth once a week On wednesday      folic acid (FOLVITE) 1 MG tablet Take 1 mg by mouth daily      aspirin EC 81 MG EC tablet Take 1 tablet by mouth daily 30 tablet 0    RESTASIS 0.05 % ophthalmic emulsion Place 1 drop into both eyes 2 times daily       Multiple Vitamins-Minerals (CENTRUM ADULTS PO) Take by mouth daily      tiotropium (SPIRIVA RESPIMAT) 2.5 MCG/ACT AERS inhaler Inhale 2.5 mcg into the lungs daily       Roflumilast (DALIRESP) 500 MCG tablet Take 500 mcg by mouth daily 30 tablet 3    albuterol (PROVENTIL;VENTOLIN) 90 MCG/ACT inhaler Inhale 2 puffs into the lungs every 6 hours as needed. ipratropium-albuterol (DUONEB) 0.5-2.5 (3) MG/3ML SOLN nebulizer solution Inhale 1 vial into the lungs every 4 hours. Montelukast Sodium (SINGULAIR PO) Take 10 mg by mouth nightly       metoprolol tartrate (LOPRESSOR) 25 MG tablet 25 mg 2 times daily        No current facility-administered medications for this visit. Social History     Socioeconomic History    Marital status:       Spouse name: Not on file    Number of children: Not on file    Years of education: Not on file Highest education level: Not on file   Occupational History    Not on file   Tobacco Use    Smoking status: Former     Packs/day: 1.50     Years: 40.00     Pack years: 60.00     Types: Cigarettes     Quit date: 2015     Years since quittin.9    Smokeless tobacco: Never   Vaping Use    Vaping Use: Never used   Substance and Sexual Activity    Alcohol use: No    Drug use: No    Sexual activity: Yes     Partners: Male   Other Topics Concern    Not on file   Social History Narrative    Not on file     Social Determinants of Health     Financial Resource Strain: Low Risk     Difficulty of Paying Living Expenses: Not hard at all   Food Insecurity: No Food Insecurity    Worried About [address] in the Last Year: Never true    [address] in the Last Year: Never true   Transportation Needs: No Transportation Needs    Lack of Transportation (Medical): No    Lack of Transportation (Non-Medical):  No   Physical Activity: Insufficiently Active    Days of Exercise per Week: 3 days    Minutes of Exercise per Session: 20 min   Stress: Not on file   Social Connections: Not on file   Intimate Partner Violence: Not on file   Housing Stability: Not on file      Past Medical History:   Diagnosis Date    Adrenal adenoma     left    Allergic rhinitis     Asthma     Cervical disc disorder with radiculopathy     COPD (chronic obstructive pulmonary disease) ([facility] [address]     Deviated nasal septum     s/p septoplasty    Diverticulosis     h/o abscess  s/p colon resection  Dr. 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    Endometrial polyps     s hyperplasia    Grave's disease     Graves disease     s/p SCHMITT now hypothroid    Headache(784.0)     Herniated discs     l5-s1 with DDD    HIGH CHOLESTEROL     ldl    Hypertension     Hypothyroidism     OA (osteoarthritis of the spine)     anterior wedgiy    Obesity     Osteopenia 2004    spine    Pneumonia 2001    hospitalized Postherpetic neuralgia 6/23/2014    Sarcoid     Scleritis and episcleritis of right eye     Tear meniscus knee     right    Tobacco abuse     Ulcer gastric 1980    Urticaria      Past Surgical History:   Procedure Laterality Date    CARDIAC SURGERY      pacer and defibrillator     NASAL SEPTUM SURGERY      SUBTOTAL COLECTOMY      diverticular abscess     TUBAL LIGATION         Review of Systems   Constitutional:  Positive for unexpected weight change (losing). Negative for appetite change. HENT:  Positive for mouth sores and sore throat. Negative for sinus pressure. Eyes:         Less redness and watering    Respiratory:  Negative for cough, shortness of breath and wheezing. Cardiovascular:  Positive for chest pain. Negative for palpitations and leg swelling. Gastrointestinal:  Negative for abdominal pain, blood in stool, diarrhea, nausea and vomiting. No heart burn   Endocrine: Negative for cold intolerance and heat intolerance. Neurological:  Negative for dizziness, light-headedness and headaches. OBJECTIVE:  /70 (Site: Left Upper Arm, Position: Sitting, Cuff Size: Medium Adult)   Pulse 69   Temp 97.5 °F (36.4 °C) (Oral)   Ht 5' 1\" (1.549 m)   Wt 169 lb (76.7 kg)   LMP  (LMP Unknown)   SpO2 95%   BMI 31.93 kg/m²      Body mass index is 31.93 kg/m². Physical Exam  Constitutional:       General: She is not in acute distress. Appearance: Normal appearance. She is obese. HENT:      Head: Normocephalic and atraumatic. Right Ear: Tympanic membrane and ear canal normal.      Left Ear: Tympanic membrane and ear canal normal.      Mouth/Throat:      Pharynx: Posterior oropharyngeal erythema present. No oropharyngeal exudate. Comments: White lesions on roof of mouth and throat   Eyes:      Conjunctiva/sclera: Conjunctivae normal.      Comments: Sclera red but better   Neck:      Vascular: No carotid bruit.    Cardiovascular:      Rate and Rhythm: Normal rate and regular rhythm. Pulses: Normal pulses. Heart sounds: Normal heart sounds. Comments: Varicose veins   Pulmonary:      Effort: Pulmonary effort is normal.      Breath sounds: Normal breath sounds. No wheezing. Comments: Decreased bs  Chest:      Chest wall: No tenderness. Abdominal:      General: There is no distension. Palpations: Abdomen is soft. Tenderness: There is no abdominal tenderness. Musculoskeletal:      Cervical back: Neck supple. Right lower leg: No edema. Left lower leg: No edema. Comments: tr   Lymphadenopathy:      Cervical: No cervical adenopathy. Skin:     General: Skin is warm and dry. Neurological:      General: No focal deficit present. Mental Status: She is alert. Gait: Gait normal.   Psychiatric:         Behavior: Behavior normal.         Thought Content: Thought content normal.      Comments: Anxious        ASSESSMENT/PLAN:    Fatuma was seen today for pharyngitis. Diagnoses and all orders for this visit:    Leukoplakia of oral cavity  -     cefUROXime (CEFTIN) 250 MG tablet; Take 1 tablet by mouth 2 times daily for 7 days  -     chlorhexidine (PERIDEX) 0.12 % solution; Take 15 mLs by mouth 2 times daily for 14 days    Poor dentition  -     cefUROXime (CEFTIN) 250 MG tablet; Take 1 tablet by mouth 2 times daily for 7 days  -     chlorhexidine (PERIDEX) 0.12 % solution; Take 15 mLs by mouth 2 times daily for 14 days    Malignant neoplasm of upper lobe of right lung (HCC)planning radiation therapy     Chronic obstructive pulmonary disease with acute exacerbation (HCC)    Elevated liver enzymes  -     Hepatic Function Panel;  Future    Sarcoidosis of other sites better on humira      Vaccine   -     Pneumococcal, PCV20, PREVNAR 20, (age 25 yrs+), IM, PF        Orders Placed This Encounter   Medications    cefUROXime (CEFTIN) 250 MG tablet     Sig: Take 1 tablet by mouth 2 times daily for 7 days     Dispense:  14 tablet     Refill:  0 chlorhexidine (PERIDEX) 0.12 % solution     Sig: Take 15 mLs by mouth 2 times daily for 14 days     Dispense:  420 mL     Refill:  0        No follow-ups on file. There are no Patient Instructions on file for this visit.

## 2022-12-25 ASSESSMENT — ENCOUNTER SYMPTOMS
SORE THROAT: 1
WHEEZING: 0
SINUS PRESSURE: 0
SHORTNESS OF BREATH: 0
BLOOD IN STOOL: 0
NAUSEA: 0
COUGH: 0
DIARRHEA: 0
VOMITING: 0
ABDOMINAL PAIN: 0

## 2022-12-29 ENCOUNTER — TELEPHONE (OUTPATIENT)
Dept: CARDIOLOGY CLINIC | Age: 72
End: 2022-12-29

## 2022-12-29 NOTE — TELEPHONE ENCOUNTER
Suze from [place] [address] called in wanting to speak with someone regarding Fatuma's pacemaker. She states that Judy Henderson will be undergoing radiation and wanted ask a couple questions regarding her pacemaker.     Suze can be reached at: [phone]

## 2022-12-29 NOTE — TELEPHONE ENCOUNTER
Spoke to Miguel, informed pt has a [employer] device with a [employer] lead and a [employer] lead. Informed Suze cooper [employer] rep will have to interrogate device before and after radiation. She wanted to inform Dr Simone Carvalho that pt will be having radiation on 1/5/23-1/13/23.

## 2022-12-30 ENCOUNTER — NURSE ONLY (OUTPATIENT)
Dept: CARDIOLOGY CLINIC | Age: 72
End: 2022-12-30
Payer: MEDICARE

## 2022-12-30 DIAGNOSIS — I46.9 CARDIAC ARREST (HCC): ICD-10-CM

## 2022-12-30 DIAGNOSIS — I47.20 VT (VENTRICULAR TACHYCARDIA): ICD-10-CM

## 2022-12-30 DIAGNOSIS — Z95.810 DUAL ICD (IMPLANTABLE CARDIOVERTER-DEFIBRILLATOR) IN PLACE: Primary | ICD-10-CM

## 2022-12-30 PROCEDURE — 93296 REM INTERROG EVL PM/IDS: CPT | Performed by: INTERNAL MEDICINE

## 2022-12-30 PROCEDURE — 93295 DEV INTERROG REMOTE 1/2/MLT: CPT | Performed by: INTERNAL MEDICINE

## 2022-12-30 NOTE — PROGRESS NOTES
Remote transmission received from patient's dual chamber ICD monitor at home. Transmission shows normal sensing and pacing function. Noted AT and PMT (diltiazem, Lopressor). Ap 2.1%   <1%  PVC <1%    Corvue is within normal limits with slight decreased trend noted. End of 91-day monitoring period 12/30/22. EP physician will review. See interrogation under cardiology tab in the [address] [place] [address] [address] field for more details. Will continue to monitor remotely.

## 2023-01-03 LAB
ALBUMIN SERPL-MCNC: 4.2 GM/DL (ref 3.2–4.6)
ALP BLD-CCNC: 69 U/L (ref 36–123)
ALT SERPL-CCNC: 16 U/L
AST SERPL-CCNC: 19 U/L
BILIRUB SERPL-MCNC: 0.7 MG/DL (ref 0.1–1.3)
BILIRUBIN DIRECT: NORMAL MG/DL (ref 0–0.3)
TOTAL PROTEIN: 6.8 GM/DL (ref 6.4–8.3)

## 2023-01-20 DIAGNOSIS — F41.9 ANXIETY: ICD-10-CM

## 2023-01-20 NOTE — TELEPHONE ENCOUNTER
Er follow up scheduled. PRINCIPAL PROCEDURE  Procedure: Open reduction and internal fixation of fracture of left femoral neck with dynamic hip screw  Findings and Treatment:

## 2023-01-20 NOTE — TELEPHONE ENCOUNTER
Pt stated that she took her last radiation on Thursday but she has to wait 3 months to find out if the radiation took, she is having anxiety that is the reason for the request     LOV   12/15/2022  NOV    none

## 2023-01-22 RX ORDER — ALPRAZOLAM 0.25 MG/1
0.25 TABLET ORAL 2 TIMES DAILY PRN
Qty: 30 TABLET | Refills: 1 | Status: SHIPPED | OUTPATIENT
Start: 2023-01-22 | End: 2023-02-21

## 2023-01-23 RX ORDER — DILTIAZEM HYDROCHLORIDE 180 MG/1
CAPSULE, EXTENDED RELEASE ORAL
Qty: 90 CAPSULE | Refills: 1 | Status: SHIPPED | OUTPATIENT
Start: 2023-01-23

## 2023-02-07 RX ORDER — ALBUTEROL SULFATE 90 UG/1
2 AEROSOL, METERED RESPIRATORY (INHALATION) 4 TIMES DAILY PRN
Qty: 1 EACH | Refills: 0 | Status: SHIPPED | OUTPATIENT
Start: 2023-02-07

## 2023-03-06 RX ORDER — ALBUTEROL SULFATE 90 UG/1
AEROSOL, METERED RESPIRATORY (INHALATION)
Qty: 1 EACH | Refills: 0 | Status: SHIPPED | OUTPATIENT
Start: 2023-03-06

## 2023-03-11 DIAGNOSIS — E03.9 ACQUIRED HYPOTHYROIDISM: ICD-10-CM

## 2023-03-13 RX ORDER — LEVOTHYROXINE SODIUM 88 UG/1
TABLET ORAL
Qty: 90 TABLET | Refills: 1 | Status: SHIPPED | OUTPATIENT
Start: 2023-03-13

## 2023-04-11 ENCOUNTER — NURSE ONLY (OUTPATIENT)
Dept: CARDIOLOGY CLINIC | Age: 73
End: 2023-04-11
Payer: MEDICARE

## 2023-04-11 DIAGNOSIS — I46.9 CARDIAC ARREST (HCC): ICD-10-CM

## 2023-04-11 DIAGNOSIS — I47.20 VT (VENTRICULAR TACHYCARDIA) (HCC): ICD-10-CM

## 2023-04-11 DIAGNOSIS — Z95.810 DUAL ICD (IMPLANTABLE CARDIOVERTER-DEFIBRILLATOR) IN PLACE: Primary | ICD-10-CM

## 2023-04-11 PROCEDURE — 93295 DEV INTERROG REMOTE 1/2/MLT: CPT | Performed by: INTERNAL MEDICINE

## 2023-04-11 PROCEDURE — 93296 REM INTERROG EVL PM/IDS: CPT | Performed by: INTERNAL MEDICINE

## 2023-04-17 NOTE — PROGRESS NOTES
Remote transmission received from patient's dual chamber ICD monitor at home. Transmission shows normal sensing and pacing function. RA/RV threshold monitoring is currently programmed OFF-to be addressed at next OV. Noted AT and PMT. Per med list, pt on diltiazem; Lopressor may have ended 03.31.2022. Ap 5.9%   <1%  PVC <1%    Corvue is within normal limits. End of 91-day monitoring period 4/11/23. EP physician will review. See interrogation under cardiology tab in the [address] [place] [address] [address] field for more details. Will continue to monitor remotely.

## 2023-04-20 DIAGNOSIS — F51.01 PRIMARY INSOMNIA: ICD-10-CM

## 2023-04-21 RX ORDER — ZOLPIDEM TARTRATE 10 MG/1
10 TABLET ORAL NIGHTLY PRN
Qty: 90 TABLET | Refills: 0 | Status: SHIPPED | OUTPATIENT
Start: 2023-04-21 | End: 2023-07-20

## 2023-05-01 DIAGNOSIS — F41.9 ANXIETY: ICD-10-CM

## 2023-05-01 RX ORDER — ALPRAZOLAM 0.25 MG/1
0.25 TABLET ORAL 2 TIMES DAILY PRN
Qty: 30 TABLET | Refills: 0 | Status: SHIPPED | OUTPATIENT
Start: 2023-05-01 | End: 2023-05-31

## 2023-05-04 ENCOUNTER — OFFICE VISIT (OUTPATIENT)
Dept: FAMILY MEDICINE CLINIC | Age: 73
End: 2023-05-04
Payer: MEDICARE

## 2023-05-04 VITALS
HEIGHT: 61 IN | DIASTOLIC BLOOD PRESSURE: 78 MMHG | TEMPERATURE: 98.1 F | HEART RATE: 76 BPM | WEIGHT: 183 LBS | SYSTOLIC BLOOD PRESSURE: 129 MMHG | OXYGEN SATURATION: 94 % | BODY MASS INDEX: 34.55 KG/M2

## 2023-05-04 DIAGNOSIS — F41.9 ANXIETY AND DEPRESSION: ICD-10-CM

## 2023-05-04 DIAGNOSIS — E78.00 HYPERCHOLESTEREMIA: ICD-10-CM

## 2023-05-04 DIAGNOSIS — F51.01 PRIMARY INSOMNIA: ICD-10-CM

## 2023-05-04 DIAGNOSIS — Z79.899 MEDICATION MANAGEMENT: ICD-10-CM

## 2023-05-04 DIAGNOSIS — I10 ESSENTIAL HYPERTENSION: ICD-10-CM

## 2023-05-04 DIAGNOSIS — R73.03 PRE-DIABETES: ICD-10-CM

## 2023-05-04 DIAGNOSIS — C34.11 MALIGNANT NEOPLASM OF UPPER LOBE OF RIGHT LUNG (HCC): Primary | ICD-10-CM

## 2023-05-04 DIAGNOSIS — F32.A ANXIETY AND DEPRESSION: ICD-10-CM

## 2023-05-04 DIAGNOSIS — F41.9 ANXIETY: ICD-10-CM

## 2023-05-04 DIAGNOSIS — D86.89 SARCOIDOSIS OF OTHER SITES: ICD-10-CM

## 2023-05-04 DIAGNOSIS — E03.9 ACQUIRED HYPOTHYROIDISM: ICD-10-CM

## 2023-05-04 DIAGNOSIS — Z91.81 AT HIGH RISK FOR FALLS: ICD-10-CM

## 2023-05-04 PROCEDURE — 3017F COLORECTAL CA SCREEN DOC REV: CPT | Performed by: INTERNAL MEDICINE

## 2023-05-04 PROCEDURE — 3078F DIAST BP <80 MM HG: CPT | Performed by: INTERNAL MEDICINE

## 2023-05-04 PROCEDURE — 1124F ACP DISCUSS-NO DSCNMKR DOCD: CPT | Performed by: INTERNAL MEDICINE

## 2023-05-04 PROCEDURE — G8427 DOCREV CUR MEDS BY ELIG CLIN: HCPCS | Performed by: INTERNAL MEDICINE

## 2023-05-04 PROCEDURE — 1090F PRES/ABSN URINE INCON ASSESS: CPT | Performed by: INTERNAL MEDICINE

## 2023-05-04 PROCEDURE — G8399 PT W/DXA RESULTS DOCUMENT: HCPCS | Performed by: INTERNAL MEDICINE

## 2023-05-04 PROCEDURE — 1036F TOBACCO NON-USER: CPT | Performed by: INTERNAL MEDICINE

## 2023-05-04 PROCEDURE — G8417 CALC BMI ABV UP PARAM F/U: HCPCS | Performed by: INTERNAL MEDICINE

## 2023-05-04 PROCEDURE — 99214 OFFICE O/P EST MOD 30 MIN: CPT | Performed by: INTERNAL MEDICINE

## 2023-05-04 PROCEDURE — 3074F SYST BP LT 130 MM HG: CPT | Performed by: INTERNAL MEDICINE

## 2023-05-04 RX ORDER — CYCLOBENZAPRINE HCL 10 MG
10 TABLET ORAL 3 TIMES DAILY PRN
COMMUNITY

## 2023-05-04 RX ORDER — INFLIXIMAB 100 MG/10ML
5 INJECTION, POWDER, LYOPHILIZED, FOR SOLUTION INTRAVENOUS
Qty: 1 EACH | Refills: 4
Start: 2023-05-04 | End: 2023-05-04

## 2023-05-04 RX ORDER — BUSPIRONE HYDROCHLORIDE 10 MG/1
TABLET ORAL
Qty: 180 TABLET | Refills: 0 | Status: SHIPPED | OUTPATIENT
Start: 2023-05-04

## 2023-05-04 SDOH — ECONOMIC STABILITY: FOOD INSECURITY: WITHIN THE PAST 12 MONTHS, YOU WORRIED THAT YOUR FOOD WOULD RUN OUT BEFORE YOU GOT MONEY TO BUY MORE.: NEVER TRUE

## 2023-05-04 SDOH — ECONOMIC STABILITY: FOOD INSECURITY: WITHIN THE PAST 12 MONTHS, THE FOOD YOU BOUGHT JUST DIDN'T LAST AND YOU DIDN'T HAVE MONEY TO GET MORE.: NEVER TRUE

## 2023-05-04 SDOH — ECONOMIC STABILITY: HOUSING INSECURITY
IN THE LAST 12 MONTHS, WAS THERE A TIME WHEN YOU DID NOT HAVE A STEADY PLACE TO SLEEP OR SLEPT IN A SHELTER (INCLUDING NOW)?: NO

## 2023-05-04 SDOH — ECONOMIC STABILITY: INCOME INSECURITY: HOW HARD IS IT FOR YOU TO PAY FOR THE VERY BASICS LIKE FOOD, HOUSING, MEDICAL CARE, AND HEATING?: NOT HARD AT ALL

## 2023-05-04 ASSESSMENT — PATIENT HEALTH QUESTIONNAIRE - PHQ9
9. THOUGHTS THAT YOU WOULD BE BETTER OFF DEAD, OR OF HURTING YOURSELF: 0
7. TROUBLE CONCENTRATING ON THINGS, SUCH AS READING THE NEWSPAPER OR WATCHING TELEVISION: 1
SUM OF ALL RESPONSES TO PHQ QUESTIONS 1-9: 5
SUM OF ALL RESPONSES TO PHQ QUESTIONS 1-9: 5
1. LITTLE INTEREST OR PLEASURE IN DOING THINGS: 1
8. MOVING OR SPEAKING SO SLOWLY THAT OTHER PEOPLE COULD HAVE NOTICED. OR THE OPPOSITE, BEING SO FIGETY OR RESTLESS THAT YOU HAVE BEEN MOVING AROUND A LOT MORE THAN USUAL: 0
SUM OF ALL RESPONSES TO PHQ QUESTIONS 1-9: 5
2. FEELING DOWN, DEPRESSED OR HOPELESS: 1
5. POOR APPETITE OR OVEREATING: 1
SUM OF ALL RESPONSES TO PHQ QUESTIONS 1-9: 5
6. FEELING BAD ABOUT YOURSELF - OR THAT YOU ARE A FAILURE OR HAVE LET YOURSELF OR YOUR FAMILY DOWN: 0
10. IF YOU CHECKED OFF ANY PROBLEMS, HOW DIFFICULT HAVE THESE PROBLEMS MADE IT FOR YOU TO DO YOUR WORK, TAKE CARE OF THINGS AT HOME, OR GET ALONG WITH OTHER PEOPLE: 0
4. FEELING TIRED OR HAVING LITTLE ENERGY: 0
3. TROUBLE FALLING OR STAYING ASLEEP: 1
SUM OF ALL RESPONSES TO PHQ9 QUESTIONS 1 & 2: 2

## 2023-05-04 NOTE — PROGRESS NOTES
SUBJECTIVE:  Yousif Pfeiffer is a 81 y.o. female being evaluated for:    Chief Complaint   Patient presents with    Anxiety      Patient is here for followup on anxiety medication . HPI   Breathing has been doing well   Sarcoid in her eye and more trouble with her vision    CTS on the left  worse  and is seeing ortho blancacy  for this    Radiation for lung cancer and following up with radiologist   keeping an eye on new left sided lesion and feels it is sarcoid     Gets real anxious and hard time sleeping guesses all is the same  Allergies   Allergen Reactions    Atarax [Hydroxyzine] Hives    Codeine Other (See Comments)     Makes her feel hyper    Sulfa Antibiotics Nausea Only    Zithromax [Azithromycin]      Itching and burning     Current Outpatient Medications   Medication Sig Dispense Refill    busPIRone (BUSPAR) 10 MG tablet TAKE 1 TABLET IN THE MORNING AND AT BEDTIME 180 tablet 0    cyclobenzaprine (FLEXERIL) 10 MG tablet Take 1 tablet by mouth 3 times daily as needed for Muscle spasms      inFLIXimab (REMICADE) 100 MG injection Infuse 40 mLs intravenously Every 2 months for 1 dose 1 each 4    ALPRAZolam (XANAX) 0.25 MG tablet Take 1 tablet by mouth 2 times daily as needed for Anxiety for up to 30 days. Max Daily Amount: 0.5 mg 30 tablet 0    zolpidem (AMBIEN) 10 MG tablet Take 1 tablet by mouth nightly as needed for Sleep for up to 90 days.  Max Daily Amount: 10 mg 90 tablet 0    levothyroxine (SYNTHROID) 88 MCG tablet TAKE 1 TABLET EVERY DAY 90 tablet 1    albuterol sulfate HFA (PROVENTIL;VENTOLIN;PROAIR) 108 (90 Base) MCG/ACT inhaler INHALE 2 PUFFS FOUR TIMES DAILY AS NEEDED FOR WHEEZING 1 each 0    dilTIAZem (DILACOR XR) 180 MG extended release capsule TAKE 1 CAPSULE EVERY DAY 90 capsule 1    furosemide (LASIX) 20 MG tablet Take 1 tablet by mouth once daily 60 tablet 0    meclizine (ANTIVERT) 12.5 MG tablet Take 1 tablet by mouth 3 times daily as needed for Dizziness 30 tablet 1    trimethoprim-polymyxin

## 2023-05-07 ASSESSMENT — ENCOUNTER SYMPTOMS
DIARRHEA: 0
EYE DISCHARGE: 1
WHEEZING: 0
SHORTNESS OF BREATH: 1
VOMITING: 0
EYE REDNESS: 1
BLOOD IN STOOL: 0
ABDOMINAL PAIN: 0
COUGH: 0
NAUSEA: 0

## 2023-05-12 ENCOUNTER — TELEPHONE (OUTPATIENT)
Dept: FAMILY MEDICINE CLINIC | Age: 73
End: 2023-05-12

## 2023-05-16 ENCOUNTER — CARE COORDINATION (OUTPATIENT)
Dept: CARE COORDINATION | Age: 73
End: 2023-05-16

## 2023-05-16 ASSESSMENT — ENCOUNTER SYMPTOMS: DYSPNEA ASSOCIATED WITH: EXERTION

## 2023-05-16 NOTE — CARE COORDINATION
Ambulatory Care Coordination Note  2023    Patient Current Location:  Home: [address] [place] [id number]    ACM contacted the patient by telephone. Verified name and  with patient as identifiers. Provided introduction to self, and explanation of the ACM role. ACM: Gustabo Joy RN    Challenges to be reviewed by the provider   Additional needs identified to be addressed with provider: No  Has fu w/Pulm 23               Method of communication with provider: phone. Spoke to patient for initial cc screening from ED discharge. Patient had unrelieved sob, went to ED and admitted for pneumonia. Patient has a follow up with Pulmonary 23. Patient reports no falls or injuries. Grab bars and tub mat present in home. Well lit, reduced clutter. Patient confirms she uses her nebulizer and inhalers as needed for sob upon exertion. Sending  education related to COPD/htn. No additional questions, concerns. Plan:  POC to pcp  [facility] COPD/htn mc edu sent  FU pulm care everywhere from 23  Caregaps/additional needs     Offered patient enrollment in the Remote Patient Monitoring (RPM) program for in-home monitoring: Patient is not eligible for RPM program.    Ambulatory Care Coordination Assessment    Care Coordination Protocol  Referral from Primary Care Provider: Yes  Week 1 - Initial Assessment     Do you have all of your prescriptions and are they filled?: Yes  Barriers to medication adherence: None  Are you able to afford your medications?: Yes  How often do you have trouble taking your medications the way you have been told to take them?: I always take them as prescribed. Do you have Home O2 Therapy?: No      Ability to seek help/take action for Emergent Urgent situations i.e. fire, crime, inclement weather or health crisis. : Independent  Ability to ambulate to restroom: Independent  Ability handle personal hygeine needs (bathing/dressing/grooming):  Independent  Ability to manage

## 2023-05-18 RX ORDER — ALBUTEROL SULFATE 90 UG/1
2 AEROSOL, METERED RESPIRATORY (INHALATION) EVERY 4 HOURS PRN
Qty: 3 EACH | Refills: 1 | Status: SHIPPED | OUTPATIENT
Start: 2023-05-18

## 2023-05-31 DIAGNOSIS — F41.9 ANXIETY: ICD-10-CM

## 2023-05-31 RX ORDER — ALPRAZOLAM 0.25 MG/1
0.25 TABLET ORAL 2 TIMES DAILY PRN
Qty: 30 TABLET | Refills: 2 | Status: SHIPPED | OUTPATIENT
Start: 2023-05-31 | End: 2023-07-15

## 2023-06-06 ENCOUNTER — CARE COORDINATION (OUTPATIENT)
Dept: CARE COORDINATION | Age: 73
End: 2023-06-06

## 2023-06-06 NOTE — CARE COORDINATION
Ambulatory Care Coordination Note  2023    Patient Current Location:  Home: [address]   [place] [address]     ACM contacted the patient by telephone. Verified name and  with patient as identifiers. Challenges to be reviewed by the provider   Additional needs identified to be addressed with provider: No  none               Method of communication with provider: phone. ACM: Montse Grewal RN    Spoke to patient for cc follow up. Patient states she was getting ready to walk out the door so did not want to talk very long. Patient did fu with ortho 23 from  CTR surgery. Also received right knee cortisone injection during same visit. Patient states pulmonary fu 23 and pneumonia has resolved. Sats 96% RA. Patient confirmed she has received and reviewed [facility] handouts copd/htn. No additional questions at the time. No c/o sob,fatigue, cough, cp. Plan:  DO sdoh/care gaps  Additional needs    Offered patient enrollment in the Remote Patient Monitoring (RPM) program for in-home monitoring: Patient is not eligible for RPM program.    Lab Results       None            Care Coordination Interventions    Referral from Primary Care Provider: Yes  Suggested Interventions and Community Resources  Zone Management Tools: Completed (Comment:  COPD/HTN 23)  Other Services or Interventions: 23 NO [facility]          Goals Addressed                   This Visit's Progress     Conditions and Symptoms   On track     I will schedule office visits, as directed by my provider. I will keep my appointment or reschedule if I have to cancel. I will notify my provider of any barriers to my plan of care. I will follow my Zone Management tool to seek urgent or emergent care. I will notify my provider of any symptoms that indicate a worsening of my condition.     Barriers: lack of support, overwhelmed by complexity of regimen, stress, and lack of education  Plan for overcoming my barriers: RN

## 2023-06-26 ENCOUNTER — CARE COORDINATION (OUTPATIENT)
Dept: CARE COORDINATION | Age: 73
End: 2023-06-26

## 2023-06-30 RX ORDER — DILTIAZEM HYDROCHLORIDE 180 MG/1
CAPSULE, EXTENDED RELEASE ORAL
Qty: 90 CAPSULE | Refills: 1 | Status: SHIPPED | OUTPATIENT
Start: 2023-06-30

## 2023-07-13 ENCOUNTER — CARE COORDINATION (OUTPATIENT)
Dept: CARE COORDINATION | Age: 73
End: 2023-07-13

## 2023-08-03 ENCOUNTER — CARE COORDINATION (OUTPATIENT)
Dept: CARE COORDINATION | Age: 73
End: 2023-08-03

## 2023-08-03 NOTE — CARE COORDINATION
UTR patient for cc outreaches. Will resolve episode but remain available if patient needs assistance in future.

## 2023-08-14 RX ORDER — ALBUTEROL SULFATE 90 UG/1
2 AEROSOL, METERED RESPIRATORY (INHALATION) EVERY 4 HOURS PRN
Qty: 3 EACH | Refills: 1 | Status: SHIPPED | OUTPATIENT
Start: 2023-08-14

## 2023-09-06 DIAGNOSIS — F41.9 ANXIETY: ICD-10-CM

## 2023-09-07 RX ORDER — ALPRAZOLAM 0.25 MG/1
0.25 TABLET ORAL 2 TIMES DAILY PRN
Qty: 30 TABLET | Refills: 0 | Status: SHIPPED | OUTPATIENT
Start: 2023-09-07 | End: 2023-09-22

## 2023-09-08 NOTE — TELEPHONE ENCOUNTER
Needs visit has not been seen for a long time  to schedule no more after this one without a visiit  and is due for medicare wellness so make it that

## 2023-09-13 ENCOUNTER — OFFICE VISIT (OUTPATIENT)
Dept: FAMILY MEDICINE CLINIC | Age: 73
End: 2023-09-13
Payer: MEDICARE

## 2023-09-13 VITALS
HEIGHT: 61 IN | HEART RATE: 67 BPM | BODY MASS INDEX: 35.68 KG/M2 | SYSTOLIC BLOOD PRESSURE: 118 MMHG | TEMPERATURE: 97.7 F | DIASTOLIC BLOOD PRESSURE: 76 MMHG | OXYGEN SATURATION: 98 % | WEIGHT: 189 LBS

## 2023-09-13 DIAGNOSIS — E78.00 HYPERCHOLESTEREMIA: ICD-10-CM

## 2023-09-13 DIAGNOSIS — J44.1 CHRONIC OBSTRUCTIVE PULMONARY DISEASE WITH ACUTE EXACERBATION (HCC): ICD-10-CM

## 2023-09-13 DIAGNOSIS — I10 ESSENTIAL HYPERTENSION: ICD-10-CM

## 2023-09-13 DIAGNOSIS — F32.A ANXIETY AND DEPRESSION: ICD-10-CM

## 2023-09-13 DIAGNOSIS — F51.04 CHRONIC INSOMNIA: Primary | ICD-10-CM

## 2023-09-13 DIAGNOSIS — F41.9 ANXIETY AND DEPRESSION: ICD-10-CM

## 2023-09-13 DIAGNOSIS — D86.9 SARCOIDOSIS: ICD-10-CM

## 2023-09-13 DIAGNOSIS — R73.9 HYPERGLYCEMIA: ICD-10-CM

## 2023-09-13 DIAGNOSIS — E66.01 SEVERE OBESITY (BMI 35.0-39.9) WITH COMORBIDITY (HCC): ICD-10-CM

## 2023-09-13 DIAGNOSIS — E03.9 ACQUIRED HYPOTHYROIDISM: ICD-10-CM

## 2023-09-13 DIAGNOSIS — I47.1 SVT (SUPRAVENTRICULAR TACHYCARDIA) (HCC): ICD-10-CM

## 2023-09-13 PROBLEM — I83.019 VENOUS ULCER OF RIGHT LEG (HCC): Status: ACTIVE | Noted: 2023-09-13

## 2023-09-13 PROBLEM — L97.919 VENOUS ULCER OF RIGHT LEG (HCC): Status: ACTIVE | Noted: 2023-09-13

## 2023-09-13 PROCEDURE — 1036F TOBACCO NON-USER: CPT | Performed by: INTERNAL MEDICINE

## 2023-09-13 PROCEDURE — 3074F SYST BP LT 130 MM HG: CPT | Performed by: INTERNAL MEDICINE

## 2023-09-13 PROCEDURE — G8399 PT W/DXA RESULTS DOCUMENT: HCPCS | Performed by: INTERNAL MEDICINE

## 2023-09-13 PROCEDURE — G8427 DOCREV CUR MEDS BY ELIG CLIN: HCPCS | Performed by: INTERNAL MEDICINE

## 2023-09-13 PROCEDURE — 3023F SPIROM DOC REV: CPT | Performed by: INTERNAL MEDICINE

## 2023-09-13 PROCEDURE — 99214 OFFICE O/P EST MOD 30 MIN: CPT | Performed by: INTERNAL MEDICINE

## 2023-09-13 PROCEDURE — 3017F COLORECTAL CA SCREEN DOC REV: CPT | Performed by: INTERNAL MEDICINE

## 2023-09-13 PROCEDURE — 3078F DIAST BP <80 MM HG: CPT | Performed by: INTERNAL MEDICINE

## 2023-09-13 PROCEDURE — G8417 CALC BMI ABV UP PARAM F/U: HCPCS | Performed by: INTERNAL MEDICINE

## 2023-09-13 PROCEDURE — 1124F ACP DISCUSS-NO DSCNMKR DOCD: CPT | Performed by: INTERNAL MEDICINE

## 2023-09-13 PROCEDURE — 1090F PRES/ABSN URINE INCON ASSESS: CPT | Performed by: INTERNAL MEDICINE

## 2023-09-13 RX ORDER — FLUTICASONE PROPIONATE 50 MCG
1 SPRAY, SUSPENSION (ML) NASAL DAILY
Qty: 16 G | Refills: 2 | Status: SHIPPED | OUTPATIENT
Start: 2023-09-13

## 2023-09-13 RX ORDER — ZOLPIDEM TARTRATE 12.5 MG/1
12.5 TABLET, FILM COATED, EXTENDED RELEASE ORAL NIGHTLY PRN
Qty: 30 TABLET | Refills: 0 | Status: SHIPPED | OUTPATIENT
Start: 2023-09-13 | End: 2023-10-13

## 2023-09-13 ASSESSMENT — PATIENT HEALTH QUESTIONNAIRE - PHQ9
SUM OF ALL RESPONSES TO PHQ QUESTIONS 1-9: 0
SUM OF ALL RESPONSES TO PHQ QUESTIONS 1-9: 0
10. IF YOU CHECKED OFF ANY PROBLEMS, HOW DIFFICULT HAVE THESE PROBLEMS MADE IT FOR YOU TO DO YOUR WORK, TAKE CARE OF THINGS AT HOME, OR GET ALONG WITH OTHER PEOPLE: 0
2. FEELING DOWN, DEPRESSED OR HOPELESS: 0
SUM OF ALL RESPONSES TO PHQ QUESTIONS 1-9: 0
8. MOVING OR SPEAKING SO SLOWLY THAT OTHER PEOPLE COULD HAVE NOTICED. OR THE OPPOSITE, BEING SO FIGETY OR RESTLESS THAT YOU HAVE BEEN MOVING AROUND A LOT MORE THAN USUAL: 0
4. FEELING TIRED OR HAVING LITTLE ENERGY: 0
9. THOUGHTS THAT YOU WOULD BE BETTER OFF DEAD, OR OF HURTING YOURSELF: 0
7. TROUBLE CONCENTRATING ON THINGS, SUCH AS READING THE NEWSPAPER OR WATCHING TELEVISION: 0
5. POOR APPETITE OR OVEREATING: 0
1. LITTLE INTEREST OR PLEASURE IN DOING THINGS: 0
SUM OF ALL RESPONSES TO PHQ QUESTIONS 1-9: 0
3. TROUBLE FALLING OR STAYING ASLEEP: 0
6. FEELING BAD ABOUT YOURSELF - OR THAT YOU ARE A FAILURE OR HAVE LET YOURSELF OR YOUR FAMILY DOWN: 0
SUM OF ALL RESPONSES TO PHQ9 QUESTIONS 1 & 2: 0

## 2023-09-13 NOTE — PROGRESS NOTES
SUBJECTIVE:  Davon Maria is a 81 y.o. female being evaluated for:    Chief Complaint   Patient presents with    Cough     Sore throat - started approx 3 weeks ago 8/21/2023 - getting sores in mouth since 7/20/23 -  Cough due to COPD per pt. HPI   Chronic cough from COPD but breathing has been good  Sarcoid and vision had been fine  Hx of  small cell and treated with XRT and imaging with decrease size of nodule  Follows with Dr Eliel Ferguson and Dr Inge Oneill   Not sleeping well even with Carla Saeed in past not helpful  Wants to try long activng form   Allergies   Allergen Reactions    Atarax [Hydroxyzine] Hives    Codeine Other (See Comments)     Makes her feel hyper    Sulfa Antibiotics Nausea Only    Zithromax [Azithromycin]      Itching and burning     Current Outpatient Medications   Medication Sig Dispense Refill    zolpidem (AMBIEN CR) 12.5 MG extended release tablet Take 1 tablet by mouth nightly as needed for Sleep for up to 30 days. Max Daily Amount: 12.5 mg 30 tablet 0    fluticasone (FLONASE) 50 MCG/ACT nasal spray 1 spray by Each Nostril route daily 16 g 2    ALPRAZolam (XANAX) 0.25 MG tablet Take 1 tablet by mouth 2 times daily as needed for Anxiety for up to 15 days.  Max Daily Amount: 0.5 mg 30 tablet 0    dilTIAZem (DILACOR XR) 180 MG extended release capsule TAKE 1 CAPSULE EVERY DAY 90 capsule 1    busPIRone (BUSPAR) 10 MG tablet TAKE 1 TABLET IN THE MORNING AND AT BEDTIME 180 tablet 0    cyclobenzaprine (FLEXERIL) 10 MG tablet Take 1 tablet by mouth 3 times daily as needed for Muscle spasms      levothyroxine (SYNTHROID) 88 MCG tablet TAKE 1 TABLET EVERY DAY 90 tablet 1    furosemide (LASIX) 20 MG tablet Take 1 tablet by mouth once daily 60 tablet 0    trimethoprim-polymyxin b (POLYTRIM) 50367-7.1 UNIT/ML-% ophthalmic solution Place 1 drop into the right eye 3 times daily      fluticasone-salmeterol (ADVAIR) 500-50 MCG/DOSE diskus inhaler Inhale 1 puff into the lungs 2 times daily
Quit date: 2015     Years since quittin.7    Smokeless tobacco: Never   Vaping Use    Vaping Use: Never used   Substance and Sexual Activity    Alcohol use: No    Drug use: No    Sexual activity: Yes     Partners: Male   Other Topics Concern    Not on file   Social History Narrative    Not on file     Social Determinants of Health     Financial Resource Strain: Low Risk  (2023)    Overall Financial Resource Strain (CARDIA)     Difficulty of Paying Living Expenses: Not hard at all   Food Insecurity: No Food Insecurity (2023)    Hunger Vital Sign     Worried About Running Out of Food in the Last Year: Never true     [address] in the Last Year: Never true   Transportation Needs: Unknown (2023)    PRAPARE - Transportation     Lack of Transportation (Medical): Not on file     Lack of Transportation (Non-Medical):  No   Physical Activity: Insufficiently Active (3/31/2022)    Exercise Vital Sign     Days of Exercise per Week: 3 days     Minutes of Exercise per Session: 20 min   Stress: Not on file   Social Connections: Not on file   Intimate Partner Violence: Not on file   Housing Stability: Unknown (2023)    Housing Stability Vital Sign     Unable to Pay for Housing in the Last Year: Not on file     Number of [address] in the Last Year: Not on file     Unstable Housing in the Last Year: No      Past Medical History:   Diagnosis Date    Adrenal adenoma     left    Allergic rhinitis     Asthma     Cervical disc disorder with radiculopathy     COPD (chronic obstructive pulmonary disease) ([address])     Deviated nasal septum     s/p septoplasty    Diverticulosis     h/o abscess  s/p colon resection  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

## 2023-09-17 ASSESSMENT — ENCOUNTER SYMPTOMS
COUGH: 1
SHORTNESS OF BREATH: 1
WHEEZING: 0
VOMITING: 0
NAUSEA: 0
ABDOMINAL PAIN: 0
DIARRHEA: 0
BLOOD IN STOOL: 0

## 2023-09-18 ENCOUNTER — TELEPHONE (OUTPATIENT)
Dept: FAMILY MEDICINE CLINIC | Age: 73
End: 2023-09-18

## 2023-09-18 RX ORDER — CEFUROXIME AXETIL 250 MG/1
250 TABLET ORAL 2 TIMES DAILY
Qty: 20 TABLET | Refills: 0 | Status: SHIPPED | OUTPATIENT
Start: 2023-09-18 | End: 2023-09-28

## 2023-09-18 NOTE — TELEPHONE ENCOUNTER
Should we check for covid or can she do at home  Let us know results  I will call in ceftin but if worsening needs visit

## 2023-09-18 NOTE — TELEPHONE ENCOUNTER
Spoke with pt she will do a home covid test and let us know the results. Pt informed antibiotic sent in.

## 2023-09-19 LAB
ESTIMATED AVERAGE GLUCOSE: 111 MG/DL
HBA1C MFR BLD: 5.5 % (ref 4.2–5.6)
TSH ULTRASENSITIVE: 6.43 MCIU/ML (ref 0.27–4.2)

## 2023-09-20 ENCOUNTER — TELEPHONE (OUTPATIENT)
Dept: CARDIOLOGY CLINIC | Age: 73
End: 2023-09-20

## 2023-09-20 NOTE — TELEPHONE ENCOUNTER
CARDIAC CLEARANCE     What type of procedure are you having? [facility]    Which physician is performing your procedure? DR. Eileen Meyers    When is your procedure scheduled for?  9/25/23    Where are you having this procedure? [address]    Are you taking Blood Thinners? If so what? (Name/dose/frequesncy)  YES, ASPIRIN     Does the surgeon want you to stop your blood thinner? If so for how long? YES, 6 DAYS    Phone Number and Contact Name for Physicians office:  [phone]    Fax number to send information:    [phone]    Patient states she just found out she needs a cardiac clearance, surgery will be performed under MAC anesthesia.

## 2023-09-20 NOTE — TELEPHONE ENCOUNTER
Patient has not followed up since 2/2022 with Dr. Remigio Cortés. Please schedule her in f/u with Dr. Remigio Cortés at our [facility] office and can complete preop during that visit. Any questions, please ask. Thanks.

## 2023-09-22 DIAGNOSIS — F51.01 PRIMARY INSOMNIA: ICD-10-CM

## 2023-09-25 RX ORDER — ZOLPIDEM TARTRATE 10 MG/1
10 TABLET ORAL NIGHTLY PRN
Qty: 90 TABLET | Refills: 0 | Status: SHIPPED | OUTPATIENT
Start: 2023-09-25 | End: 2023-12-24

## 2023-09-28 DIAGNOSIS — F41.9 ANXIETY: ICD-10-CM

## 2023-09-28 RX ORDER — BUSPIRONE HYDROCHLORIDE 10 MG/1
TABLET ORAL
Qty: 180 TABLET | Refills: 0 | Status: SHIPPED | OUTPATIENT
Start: 2023-09-28

## 2023-10-08 DIAGNOSIS — F41.9 ANXIETY: ICD-10-CM

## 2023-10-09 RX ORDER — ALPRAZOLAM 0.25 MG/1
0.25 TABLET ORAL 2 TIMES DAILY PRN
Qty: 30 TABLET | Refills: 1 | Status: SHIPPED | OUTPATIENT
Start: 2023-10-09 | End: 2023-11-08

## 2023-10-10 ENCOUNTER — TELEPHONE (OUTPATIENT)
Dept: FAMILY MEDICINE CLINIC | Age: 73
End: 2023-10-10

## 2023-10-10 NOTE — TELEPHONE ENCOUNTER
Left message for pt to call back to reschedule her preop from [facility] to Dr Bridget Knight

## 2023-10-18 DIAGNOSIS — E03.9 ACQUIRED HYPOTHYROIDISM: ICD-10-CM

## 2023-10-18 RX ORDER — LEVOTHYROXINE SODIUM 88 UG/1
TABLET ORAL
Qty: 90 TABLET | Refills: 1 | Status: SHIPPED | OUTPATIENT
Start: 2023-10-18

## 2023-10-20 PROCEDURE — 93295 DEV INTERROG REMOTE 1/2/MLT: CPT | Performed by: INTERNAL MEDICINE

## 2023-10-20 PROCEDURE — 93296 REM INTERROG EVL PM/IDS: CPT | Performed by: INTERNAL MEDICINE

## 2023-10-25 ENCOUNTER — OFFICE VISIT (OUTPATIENT)
Dept: FAMILY MEDICINE CLINIC | Age: 73
End: 2023-10-25
Payer: MEDICARE

## 2023-10-25 VITALS
HEIGHT: 61 IN | WEIGHT: 196 LBS | RESPIRATION RATE: 16 BRPM | TEMPERATURE: 97.7 F | HEART RATE: 64 BPM | OXYGEN SATURATION: 96 % | BODY MASS INDEX: 37 KG/M2 | DIASTOLIC BLOOD PRESSURE: 60 MMHG | SYSTOLIC BLOOD PRESSURE: 100 MMHG

## 2023-10-25 DIAGNOSIS — Z01.818 PRE-OP EXAMINATION: Primary | ICD-10-CM

## 2023-10-25 DIAGNOSIS — C34.11 MALIGNANT NEOPLASM OF UPPER LOBE OF RIGHT LUNG (HCC): ICD-10-CM

## 2023-10-25 DIAGNOSIS — E03.9 ACQUIRED HYPOTHYROIDISM: ICD-10-CM

## 2023-10-25 DIAGNOSIS — D86.89 SARCOIDOSIS OF OTHER SITES: ICD-10-CM

## 2023-10-25 DIAGNOSIS — S80.812D ABRASION OF LEFT LOWER EXTREMITY, SUBSEQUENT ENCOUNTER: ICD-10-CM

## 2023-10-25 DIAGNOSIS — M17.11 PRIMARY OSTEOARTHRITIS OF RIGHT KNEE: ICD-10-CM

## 2023-10-25 DIAGNOSIS — I47.20 VT (VENTRICULAR TACHYCARDIA) (HCC): ICD-10-CM

## 2023-10-25 PROBLEM — I83.019 VENOUS ULCER OF RIGHT LEG (HCC): Status: RESOLVED | Noted: 2023-09-13 | Resolved: 2023-10-25

## 2023-10-25 PROBLEM — L97.919 VENOUS ULCER OF RIGHT LEG (HCC): Status: RESOLVED | Noted: 2023-09-13 | Resolved: 2023-10-25

## 2023-10-25 PROCEDURE — 99214 OFFICE O/P EST MOD 30 MIN: CPT | Performed by: INTERNAL MEDICINE

## 2023-10-25 PROCEDURE — G8484 FLU IMMUNIZE NO ADMIN: HCPCS | Performed by: INTERNAL MEDICINE

## 2023-10-25 PROCEDURE — 1090F PRES/ABSN URINE INCON ASSESS: CPT | Performed by: INTERNAL MEDICINE

## 2023-10-25 PROCEDURE — 3078F DIAST BP <80 MM HG: CPT | Performed by: INTERNAL MEDICINE

## 2023-10-25 PROCEDURE — G8417 CALC BMI ABV UP PARAM F/U: HCPCS | Performed by: INTERNAL MEDICINE

## 2023-10-25 PROCEDURE — G8399 PT W/DXA RESULTS DOCUMENT: HCPCS | Performed by: INTERNAL MEDICINE

## 2023-10-25 PROCEDURE — 1124F ACP DISCUSS-NO DSCNMKR DOCD: CPT | Performed by: INTERNAL MEDICINE

## 2023-10-25 PROCEDURE — 3017F COLORECTAL CA SCREEN DOC REV: CPT | Performed by: INTERNAL MEDICINE

## 2023-10-25 PROCEDURE — 3074F SYST BP LT 130 MM HG: CPT | Performed by: INTERNAL MEDICINE

## 2023-10-25 PROCEDURE — 1036F TOBACCO NON-USER: CPT | Performed by: INTERNAL MEDICINE

## 2023-10-25 PROCEDURE — G8427 DOCREV CUR MEDS BY ELIG CLIN: HCPCS | Performed by: INTERNAL MEDICINE

## 2023-10-25 RX ORDER — MONTELUKAST SODIUM 10 MG/1
1 TABLET ORAL NIGHTLY
COMMUNITY
Start: 2023-09-28

## 2023-10-25 RX ORDER — ONDANSETRON 4 MG/1
4 TABLET, FILM COATED ORAL EVERY 8 HOURS PRN
COMMUNITY
Start: 2023-09-26

## 2023-10-25 RX ORDER — MELOXICAM 7.5 MG/1
1 TABLET ORAL DAILY
COMMUNITY
Start: 2023-10-18

## 2023-10-25 RX ORDER — CLINDAMYCIN HYDROCHLORIDE 300 MG/1
1 CAPSULE ORAL 3 TIMES DAILY
COMMUNITY
Start: 2023-10-18

## 2023-10-25 ASSESSMENT — ENCOUNTER SYMPTOMS
NAUSEA: 0
COUGH: 0
TROUBLE SWALLOWING: 0
DIARRHEA: 0
BLOOD IN STOOL: 0
ABDOMINAL PAIN: 0
BACK PAIN: 0
APNEA: 0
SHORTNESS OF BREATH: 0
SINUS PRESSURE: 0
CONSTIPATION: 0
VOMITING: 0
WHEEZING: 0

## 2023-10-25 NOTE — PROGRESS NOTES
membrane and ear canal normal.      Left Ear: Tympanic membrane and ear canal normal.      Nose: Nose normal.      Mouth/Throat:      Pharynx: Oropharynx is clear. Eyes:      Conjunctiva/sclera: Conjunctivae normal.      Comments: Sclera red but better   Neck:      Vascular: No carotid bruit. Cardiovascular:      Rate and Rhythm: Normal rate and regular rhythm. Pulses: Normal pulses. Heart sounds: Normal heart sounds. Pulmonary:      Effort: Pulmonary effort is normal.      Breath sounds: No wheezing, rhonchi or rales. Comments: Decreased bs  Abdominal:      General: There is no distension. Palpations: Abdomen is soft. Tenderness: There is no abdominal tenderness. Musculoskeletal:      Cervical back: Neck supple. Right lower leg: No edema. Left lower leg: Edema present. Lymphadenopathy:      Cervical: No cervical adenopathy. Skin:     General: Skin is warm and dry. Findings: Lesion (left leg wrapped image with abrasion/ulcer clean) present. Comments: Chronic discoloration leg    Neurological:      General: No focal deficit present. Mental Status: She is alert. Gait: Gait normal.   Psychiatric:         Mood and Affect: Mood normal.         Behavior: Behavior normal.         Thought Content:  Thought content normal.         ASSESSMENT/PLAN:    Santhosh Chamberlain was seen today for pre-op exam.    Diagnoses and all orders for this visit:    Pre-op examination cleared for surgery if cleared by cardiology and leg continues to improve    Primary osteoarthritis of right knee    VT (ventricular tachycardia) (HCC)  Comments:  getting clearance from Cardiology  Dr Allred Shells     Malignant neoplasm of upper lobe of right lung ([address])  Comments:  treated with xrt  scanning to follow soon     Sarcoidosis of other sites  Comments:  on mtx and remicaid  ocular right     Acquired hypothyroidism  Comments:  no symptoms     Abrasion of left lower extremity, subsequent encounter on clinda

## 2023-11-06 ENCOUNTER — OFFICE VISIT (OUTPATIENT)
Dept: CARDIOLOGY CLINIC | Age: 73
End: 2023-11-06
Payer: MEDICARE

## 2023-11-06 VITALS
SYSTOLIC BLOOD PRESSURE: 110 MMHG | DIASTOLIC BLOOD PRESSURE: 66 MMHG | OXYGEN SATURATION: 96 % | WEIGHT: 192 LBS | HEIGHT: 61 IN | BODY MASS INDEX: 36.25 KG/M2 | HEART RATE: 68 BPM

## 2023-11-06 DIAGNOSIS — R01.1 SYSTOLIC MURMUR: ICD-10-CM

## 2023-11-06 DIAGNOSIS — Z01.810 PREOP CARDIOVASCULAR EXAM: Primary | ICD-10-CM

## 2023-11-06 PROCEDURE — 99214 OFFICE O/P EST MOD 30 MIN: CPT | Performed by: INTERNAL MEDICINE

## 2023-11-06 PROCEDURE — G8484 FLU IMMUNIZE NO ADMIN: HCPCS | Performed by: INTERNAL MEDICINE

## 2023-11-06 PROCEDURE — 1124F ACP DISCUSS-NO DSCNMKR DOCD: CPT | Performed by: INTERNAL MEDICINE

## 2023-11-06 PROCEDURE — G8399 PT W/DXA RESULTS DOCUMENT: HCPCS | Performed by: INTERNAL MEDICINE

## 2023-11-06 PROCEDURE — 3074F SYST BP LT 130 MM HG: CPT | Performed by: INTERNAL MEDICINE

## 2023-11-06 PROCEDURE — G8427 DOCREV CUR MEDS BY ELIG CLIN: HCPCS | Performed by: INTERNAL MEDICINE

## 2023-11-06 PROCEDURE — 3078F DIAST BP <80 MM HG: CPT | Performed by: INTERNAL MEDICINE

## 2023-11-06 PROCEDURE — 3017F COLORECTAL CA SCREEN DOC REV: CPT | Performed by: INTERNAL MEDICINE

## 2023-11-06 PROCEDURE — 1090F PRES/ABSN URINE INCON ASSESS: CPT | Performed by: INTERNAL MEDICINE

## 2023-11-06 PROCEDURE — 1036F TOBACCO NON-USER: CPT | Performed by: INTERNAL MEDICINE

## 2023-11-06 PROCEDURE — G8417 CALC BMI ABV UP PARAM F/U: HCPCS | Performed by: INTERNAL MEDICINE

## 2023-11-06 PROCEDURE — 93000 ELECTROCARDIOGRAM COMPLETE: CPT | Performed by: INTERNAL MEDICINE

## 2023-11-06 NOTE — PROGRESS NOTES
was admitted to [address] [address] 9/30/21 for chest pain and palpitations requiring cardioversion in the field 2/2 Vtach. EP consulted and ICD placed 10/5/21. NSTEMI s/p C 10/1/21 showed patent coronary arteries, &, LVEF 50-55%. She was s/p monoclonal antibodies 9/28/21. She reports that she has recovered and continues to work on her deconditioning.  -Hx palpitations/SVT/PAC's-Improved on metoprolol per pt.   -Last device check was from few months ago was normal except few episodes of SVT  3) LE swelling.   -Doubt related to CHF. Likely related to steroids and venous insufficiency. -Advised to limit salt intake and elevate legs as much as possible.   -10/2/21 echocardiogram    4) COPD/prior nicotine addiction. Ongoing tobacco avoidance discussed. next follow up in 6 months. Complexity of medical decision making-high    Thank you very much for allowing me to participate in the care of your patient. Please do not hesitate to contact me if you have any questions.     Sincerely,  Adal Baires MD      [facility]  [address], [address] [place], [address] [facility]  Ph: [phone]  Fax: [phone]

## 2023-11-22 RX ORDER — ALBUTEROL SULFATE 90 UG/1
2 AEROSOL, METERED RESPIRATORY (INHALATION) EVERY 4 HOURS PRN
Qty: 3 EACH | Refills: 1 | Status: SHIPPED | OUTPATIENT
Start: 2023-11-22

## 2023-12-08 DIAGNOSIS — F41.9 ANXIETY: ICD-10-CM

## 2023-12-08 RX ORDER — ALPRAZOLAM 0.25 MG/1
0.25 TABLET ORAL 2 TIMES DAILY PRN
Qty: 30 TABLET | Refills: 0 | Status: SHIPPED | OUTPATIENT
Start: 2023-12-08 | End: 2023-12-23

## 2023-12-15 ENCOUNTER — TELEPHONE (OUTPATIENT)
Dept: FAMILY MEDICINE CLINIC | Age: 73
End: 2023-12-15

## 2023-12-15 NOTE — TELEPHONE ENCOUNTER
Pt has slight fever, headaches, throat hurts, congestion , pt wants to know if she can get something called to [facility]   Did not test for Covid

## 2024-01-08 DIAGNOSIS — F41.9 ANXIETY: ICD-10-CM

## 2024-01-08 RX ORDER — ALPRAZOLAM 0.25 MG/1
0.25 TABLET ORAL 2 TIMES DAILY PRN
Qty: 30 TABLET | Refills: 0 | Status: SHIPPED | OUTPATIENT
Start: 2024-01-08 | End: 2024-02-07

## 2024-01-25 ENCOUNTER — OFFICE VISIT (OUTPATIENT)
Dept: FAMILY MEDICINE CLINIC | Age: 74
End: 2024-01-25
Payer: MEDICARE

## 2024-01-25 VITALS
SYSTOLIC BLOOD PRESSURE: 120 MMHG | OXYGEN SATURATION: 98 % | HEART RATE: 65 BPM | BODY MASS INDEX: 35.87 KG/M2 | WEIGHT: 190 LBS | TEMPERATURE: 97.5 F | DIASTOLIC BLOOD PRESSURE: 64 MMHG | HEIGHT: 61 IN | RESPIRATION RATE: 16 BRPM

## 2024-01-25 DIAGNOSIS — I47.20 VT (VENTRICULAR TACHYCARDIA) (HCC): ICD-10-CM

## 2024-01-25 DIAGNOSIS — E66.01 SEVERE OBESITY (BMI 35.0-39.9) WITH COMORBIDITY (HCC): ICD-10-CM

## 2024-01-25 DIAGNOSIS — F51.01 PRIMARY INSOMNIA: ICD-10-CM

## 2024-01-25 DIAGNOSIS — Z00.00 INITIAL MEDICARE ANNUAL WELLNESS VISIT: Primary | ICD-10-CM

## 2024-01-25 DIAGNOSIS — E03.9 ACQUIRED HYPOTHYROIDISM: ICD-10-CM

## 2024-01-25 DIAGNOSIS — J44.1 CHRONIC OBSTRUCTIVE PULMONARY DISEASE WITH ACUTE EXACERBATION (HCC): ICD-10-CM

## 2024-01-25 DIAGNOSIS — F41.9 ANXIETY AND DEPRESSION: ICD-10-CM

## 2024-01-25 DIAGNOSIS — C34.11 MALIGNANT NEOPLASM OF UPPER LOBE OF RIGHT LUNG (HCC): ICD-10-CM

## 2024-01-25 DIAGNOSIS — F32.A ANXIETY AND DEPRESSION: ICD-10-CM

## 2024-01-25 PROCEDURE — G8399 PT W/DXA RESULTS DOCUMENT: HCPCS | Performed by: INTERNAL MEDICINE

## 2024-01-25 PROCEDURE — 99212 OFFICE O/P EST SF 10 MIN: CPT | Performed by: INTERNAL MEDICINE

## 2024-01-25 PROCEDURE — 1090F PRES/ABSN URINE INCON ASSESS: CPT | Performed by: INTERNAL MEDICINE

## 2024-01-25 PROCEDURE — G0438 PPPS, INITIAL VISIT: HCPCS | Performed by: INTERNAL MEDICINE

## 2024-01-25 PROCEDURE — G8417 CALC BMI ABV UP PARAM F/U: HCPCS | Performed by: INTERNAL MEDICINE

## 2024-01-25 PROCEDURE — 1124F ACP DISCUSS-NO DSCNMKR DOCD: CPT | Performed by: INTERNAL MEDICINE

## 2024-01-25 PROCEDURE — G8427 DOCREV CUR MEDS BY ELIG CLIN: HCPCS | Performed by: INTERNAL MEDICINE

## 2024-01-25 PROCEDURE — 3078F DIAST BP <80 MM HG: CPT | Performed by: INTERNAL MEDICINE

## 2024-01-25 PROCEDURE — 1036F TOBACCO NON-USER: CPT | Performed by: INTERNAL MEDICINE

## 2024-01-25 PROCEDURE — 3017F COLORECTAL CA SCREEN DOC REV: CPT | Performed by: INTERNAL MEDICINE

## 2024-01-25 PROCEDURE — G8484 FLU IMMUNIZE NO ADMIN: HCPCS | Performed by: INTERNAL MEDICINE

## 2024-01-25 PROCEDURE — 3023F SPIROM DOC REV: CPT | Performed by: INTERNAL MEDICINE

## 2024-01-25 PROCEDURE — 3074F SYST BP LT 130 MM HG: CPT | Performed by: INTERNAL MEDICINE

## 2024-01-25 RX ORDER — IBUPROFEN 800 MG/1
800 TABLET ORAL EVERY 8 HOURS PRN
COMMUNITY
Start: 2023-12-29

## 2024-01-25 RX ORDER — PROMETHAZINE HYDROCHLORIDE 25 MG/1
25 TABLET ORAL EVERY 6 HOURS PRN
COMMUNITY
Start: 2023-11-13

## 2024-01-25 ASSESSMENT — PATIENT HEALTH QUESTIONNAIRE - PHQ9
8. MOVING OR SPEAKING SO SLOWLY THAT OTHER PEOPLE COULD HAVE NOTICED. OR THE OPPOSITE, BEING SO FIGETY OR RESTLESS THAT YOU HAVE BEEN MOVING AROUND A LOT MORE THAN USUAL: 0
4. FEELING TIRED OR HAVING LITTLE ENERGY: 0
9. THOUGHTS THAT YOU WOULD BE BETTER OFF DEAD, OR OF HURTING YOURSELF: 0
SUM OF ALL RESPONSES TO PHQ9 QUESTIONS 1 & 2: 0
5. POOR APPETITE OR OVEREATING: 0
SUM OF ALL RESPONSES TO PHQ QUESTIONS 1-9: 0
SUM OF ALL RESPONSES TO PHQ QUESTIONS 1-9: 0
10. IF YOU CHECKED OFF ANY PROBLEMS, HOW DIFFICULT HAVE THESE PROBLEMS MADE IT FOR YOU TO DO YOUR WORK, TAKE CARE OF THINGS AT HOME, OR GET ALONG WITH OTHER PEOPLE: 0
6. FEELING BAD ABOUT YOURSELF - OR THAT YOU ARE A FAILURE OR HAVE LET YOURSELF OR YOUR FAMILY DOWN: 0
1. LITTLE INTEREST OR PLEASURE IN DOING THINGS: 0
2. FEELING DOWN, DEPRESSED OR HOPELESS: 0
SUM OF ALL RESPONSES TO PHQ QUESTIONS 1-9: 0
7. TROUBLE CONCENTRATING ON THINGS, SUCH AS READING THE NEWSPAPER OR WATCHING TELEVISION: 0
3. TROUBLE FALLING OR STAYING ASLEEP: 0
SUM OF ALL RESPONSES TO PHQ QUESTIONS 1-9: 0

## 2024-01-25 ASSESSMENT — LIFESTYLE VARIABLES
HOW MANY STANDARD DRINKS CONTAINING ALCOHOL DO YOU HAVE ON A TYPICAL DAY: PATIENT DOES NOT DRINK
HOW OFTEN DO YOU HAVE A DRINK CONTAINING ALCOHOL: NEVER

## 2024-01-25 NOTE — PATIENT INSTRUCTIONS
without doing more tests.  What can you do to help prevent lung cancer?  Some lung cancers can't be prevented. But if you smoke, quitting smoking is the best step you can take to prevent lung cancer. If you want to quit, your doctor can recommend medicines or other ways to help.  Follow-up care is a key part of your treatment and safety. Be sure to make and go to all appointments, and call your doctor if you are having problems. It's also a good idea to know your test results and keep a list of the medicines you take.  Where can you learn more?  Go to https://[email or web address].net/patientEd and enter Q940 to learn more about \"Learning About Lung Cancer Screening.\"  Current as of: February 28, 2023               Content Version: 13.9  © 0856-1308 [email or web address].   Care instructions adapted under license by [employer]. If you have questions about a medical condition or this instruction, always ask your healthcare professional. [employer] disclaims any warranty or liability for your use of this information.           A Healthy Heart: Care Instructions  Your Care Instructions     Coronary artery disease, also called heart disease, occurs when a substance called plaque builds up in the vessels that supply oxygen-rich blood to your heart muscle. This can narrow the blood vessels and reduce blood flow. A heart attack happens when blood flow is completely blocked. A high-fat diet, smoking, and other factors increase the risk of heart disease.  Your doctor has found that you have a chance of having heart disease. You can do lots of things to keep your heart healthy. It may not be easy, but you can change your diet, exercise more, and quit smoking. These steps really work to lower your chance of heart disease.  Follow-up care is a key part of your treatment and safety. Be sure to make and go to all appointments, and call your doctor if you are having problems. It's also a good idea to know your test

## 2024-01-25 NOTE — PROGRESS NOTES
Ventricular tachycardia (HCC)     after covid for treatment infusion  VTach  requiring defib  follows with Dr Maciel     Past Surgical History:   Procedure Laterality Date    CARDIAC SURGERY      pacer and defibrillator     CARPAL TUNNEL RELEASE Bilateral 09/25/2023    EYE SURGERY Bilateral     cataracts    NASAL SEPTUM SURGERY      SUBTOTAL COLECTOMY      diverticular abscess     TUBAL LIGATION       Family History   Problem Relation Age of Onset    Alcohol Abuse Mother     Cirrhosis Mother     Cancer Father         lung    Coronary Art Dis Father     Other Sister         COPD obesity     No Known Problems Maternal Grandmother     No Known Problems Maternal Grandfather     No Known Problems Paternal Grandmother     No Known Problems Paternal Grandfather         Review of Systems   Constitutional:  Negative for activity change, fatigue and unexpected weight change.   HENT:  Positive for congestion, postnasal drip, rhinorrhea and sneezing. Negative for nosebleeds and trouble swallowing.    Eyes:  Negative for visual disturbance.        Sarcoid    Respiratory:  Positive for cough (not bad no sputum), shortness of breath and wheezing.         Unchanged and stable    Cardiovascular:  Negative for chest pain, palpitations and leg swelling.   Gastrointestinal:  Negative for abdominal pain, blood in stool, constipation, diarrhea, nausea and vomiting.   Endocrine: Negative for cold intolerance and heat intolerance.        Self breast exams are negative    Genitourinary:  Negative for dysuria and vaginal bleeding.   Musculoskeletal:  Positive for arthralgias (knees) and back pain. Negative for gait problem.   Skin:         No concerning skin lesions    Neurological:  Negative for dizziness, syncope, speech difficulty, weakness, light-headedness, numbness and headaches.   Psychiatric/Behavioral:  Positive for dysphoric mood and sleep disturbance. The patient is nervous/anxious.         Oldest son 50 moved in with her on

## 2024-02-12 DIAGNOSIS — F41.9 ANXIETY: ICD-10-CM

## 2024-02-12 RX ORDER — ALPRAZOLAM 0.25 MG/1
0.25 TABLET ORAL 2 TIMES DAILY PRN
Qty: 30 TABLET | Refills: 2 | Status: SHIPPED | OUTPATIENT
Start: 2024-02-12 | End: 2024-05-12

## 2024-02-14 ENCOUNTER — TELEPHONE (OUTPATIENT)
Dept: FAMILY MEDICINE CLINIC | Age: 74
End: 2024-02-14

## 2024-02-14 RX ORDER — DEXTROMETHORPHAN HYDROBROMIDE AND PROMETHAZINE HYDROCHLORIDE 15; 6.25 MG/5ML; MG/5ML
5 SYRUP ORAL 4 TIMES DAILY PRN
Qty: 118 ML | Refills: 0 | Status: SHIPPED | OUTPATIENT
Start: 2024-02-14 | End: 2024-02-21

## 2024-02-14 NOTE — TELEPHONE ENCOUNTER
Pt calling has a cough she can't get rid coughing up greenish mucous and runny nose for 2 and half weeks    Qtpus-[phone]

## 2024-02-14 NOTE — TELEPHONE ENCOUNTER
If changing sputum or wheezing or worsening should see with her lung hx   I have that she is allergic to codeine

## 2024-02-14 NOTE — TELEPHONE ENCOUNTER
Called spoke to nico does not think she needs an appointment - wanting a strong cough medication sent in.

## 2024-02-19 DIAGNOSIS — F41.9 ANXIETY: ICD-10-CM

## 2024-02-19 RX ORDER — BUSPIRONE HYDROCHLORIDE 10 MG/1
TABLET ORAL
Qty: 180 TABLET | Refills: 3 | Status: SHIPPED | OUTPATIENT
Start: 2024-02-19

## 2024-02-21 ENCOUNTER — OFFICE VISIT (OUTPATIENT)
Dept: FAMILY MEDICINE CLINIC | Age: 74
End: 2024-02-21
Payer: MEDICARE

## 2024-02-21 VITALS
DIASTOLIC BLOOD PRESSURE: 70 MMHG | HEART RATE: 65 BPM | WEIGHT: 194 LBS | SYSTOLIC BLOOD PRESSURE: 120 MMHG | BODY MASS INDEX: 36.66 KG/M2 | OXYGEN SATURATION: 98 % | TEMPERATURE: 97.9 F | RESPIRATION RATE: 16 BRPM

## 2024-02-21 DIAGNOSIS — J44.1 COPD EXACERBATION (HCC): Primary | ICD-10-CM

## 2024-02-21 PROCEDURE — G8417 CALC BMI ABV UP PARAM F/U: HCPCS | Performed by: INTERNAL MEDICINE

## 2024-02-21 PROCEDURE — 3023F SPIROM DOC REV: CPT | Performed by: INTERNAL MEDICINE

## 2024-02-21 PROCEDURE — 3017F COLORECTAL CA SCREEN DOC REV: CPT | Performed by: INTERNAL MEDICINE

## 2024-02-21 PROCEDURE — G8399 PT W/DXA RESULTS DOCUMENT: HCPCS | Performed by: INTERNAL MEDICINE

## 2024-02-21 PROCEDURE — 1036F TOBACCO NON-USER: CPT | Performed by: INTERNAL MEDICINE

## 2024-02-21 PROCEDURE — 1124F ACP DISCUSS-NO DSCNMKR DOCD: CPT | Performed by: INTERNAL MEDICINE

## 2024-02-21 PROCEDURE — 3078F DIAST BP <80 MM HG: CPT | Performed by: INTERNAL MEDICINE

## 2024-02-21 PROCEDURE — 99213 OFFICE O/P EST LOW 20 MIN: CPT | Performed by: INTERNAL MEDICINE

## 2024-02-21 PROCEDURE — G8484 FLU IMMUNIZE NO ADMIN: HCPCS | Performed by: INTERNAL MEDICINE

## 2024-02-21 PROCEDURE — G8427 DOCREV CUR MEDS BY ELIG CLIN: HCPCS | Performed by: INTERNAL MEDICINE

## 2024-02-21 PROCEDURE — 1090F PRES/ABSN URINE INCON ASSESS: CPT | Performed by: INTERNAL MEDICINE

## 2024-02-21 PROCEDURE — 3074F SYST BP LT 130 MM HG: CPT | Performed by: INTERNAL MEDICINE

## 2024-02-21 RX ORDER — FLUTICASONE PROPIONATE 50 MCG
1 SPRAY, SUSPENSION (ML) NASAL DAILY
Qty: 16 G | Refills: 2 | Status: SHIPPED | OUTPATIENT
Start: 2024-02-21

## 2024-02-21 RX ORDER — PREDNISONE 20 MG/1
TABLET ORAL
Qty: 11 TABLET | Refills: 0 | Status: SHIPPED | OUTPATIENT
Start: 2024-02-21

## 2024-02-21 RX ORDER — DEXTROMETHORPHAN HYDROBROMIDE AND PROMETHAZINE HYDROCHLORIDE 15; 6.25 MG/5ML; MG/5ML
5 SYRUP ORAL 4 TIMES DAILY PRN
Qty: 118 ML | Refills: 0 | Status: SHIPPED | OUTPATIENT
Start: 2024-02-21 | End: 2024-02-28

## 2024-02-21 ASSESSMENT — PATIENT HEALTH QUESTIONNAIRE - PHQ9
7. TROUBLE CONCENTRATING ON THINGS, SUCH AS READING THE NEWSPAPER OR WATCHING TELEVISION: 1
3. TROUBLE FALLING OR STAYING ASLEEP: 2
SUM OF ALL RESPONSES TO PHQ QUESTIONS 1-9: 7
6. FEELING BAD ABOUT YOURSELF - OR THAT YOU ARE A FAILURE OR HAVE LET YOURSELF OR YOUR FAMILY DOWN: 0
8. MOVING OR SPEAKING SO SLOWLY THAT OTHER PEOPLE COULD HAVE NOTICED. OR THE OPPOSITE, BEING SO FIGETY OR RESTLESS THAT YOU HAVE BEEN MOVING AROUND A LOT MORE THAN USUAL: 0
SUM OF ALL RESPONSES TO PHQ QUESTIONS 1-9: 7
SUM OF ALL RESPONSES TO PHQ QUESTIONS 1-9: 7
9. THOUGHTS THAT YOU WOULD BE BETTER OFF DEAD, OR OF HURTING YOURSELF: 0
5. POOR APPETITE OR OVEREATING: 1
SUM OF ALL RESPONSES TO PHQ9 QUESTIONS 1 & 2: 1
4. FEELING TIRED OR HAVING LITTLE ENERGY: 2
SUM OF ALL RESPONSES TO PHQ QUESTIONS 1-9: 7
10. IF YOU CHECKED OFF ANY PROBLEMS, HOW DIFFICULT HAVE THESE PROBLEMS MADE IT FOR YOU TO DO YOUR WORK, TAKE CARE OF THINGS AT HOME, OR GET ALONG WITH OTHER PEOPLE: 0
1. LITTLE INTEREST OR PLEASURE IN DOING THINGS: 0
2. FEELING DOWN, DEPRESSED OR HOPELESS: 1

## 2024-02-21 NOTE — PROGRESS NOTES
Mouth: Mucous membranes are dry.      Pharynx: Posterior oropharyngeal erythema present. No oropharyngeal exudate.   Eyes:      Conjunctiva/sclera: Conjunctivae normal.      Comments: Sclera red but better   Neck:      Vascular: No carotid bruit.   Cardiovascular:      Rate and Rhythm: Normal rate and regular rhythm.      Heart sounds: Normal heart sounds. No murmur heard.     No gallop.   Pulmonary:      Effort: Pulmonary effort is normal.      Breath sounds: Wheezing and rhonchi present. No rales.      Comments: Decreased bs  Chest:      Chest wall: No tenderness.   Abdominal:      General: There is no distension.      Palpations: Abdomen is soft.      Tenderness: There is no abdominal tenderness.   Musculoskeletal:      Cervical back: Neck supple.      Right lower leg: No edema.      Left lower leg: No edema.   Lymphadenopathy:      Cervical: No cervical adenopathy.   Skin:     General: Skin is warm and dry.      Comments: Chronic discoloration leg    Neurological:      General: No focal deficit present.      Mental Status: She is alert.      Gait: Gait normal.         ASSESSMENT/PLAN:    Fatuma was seen today for cough.    Diagnoses and all orders for this visit:    COPD exacerbation (HCC)  -     fluticasone (FLONASE) 50 MCG/ACT nasal spray; 1 spray by Each Nostril route daily  -     predniSONE (DELTASONE) 20 MG tablet; 2 pills daily for 3 days, 1 pill daily for 3 days and 1/2 daily for 3 days  -     promethazine-dextromethorphan (PROMETHAZINE-DM) 6.25-15 MG/5ML syrup; Take 5 mLs by mouth 4 times daily as needed for Cough        Orders Placed This Encounter   Medications    fluticasone (FLONASE) 50 MCG/ACT nasal spray     Si spray by Each Nostril route daily     Dispense:  16 g     Refill:  2    predniSONE (DELTASONE) 20 MG tablet     Si pills daily for 3 days, 1 pill daily for 3 days and 1/2 daily for 3 days     Dispense:  11 tablet     Refill:  0    promethazine-dextromethorphan (PROMETHAZINE-DM)

## 2024-02-26 DIAGNOSIS — F51.01 PRIMARY INSOMNIA: ICD-10-CM

## 2024-02-26 RX ORDER — ZOLPIDEM TARTRATE 10 MG/1
10 TABLET ORAL NIGHTLY PRN
Qty: 90 TABLET | Refills: 0 | Status: SHIPPED | OUTPATIENT
Start: 2024-02-26 | End: 2024-05-26

## 2024-03-19 ENCOUNTER — HOSPITAL ENCOUNTER (EMERGENCY)
Age: 74
Discharge: HOME | End: 2024-03-19
Payer: MEDICARE

## 2024-03-19 VITALS
OXYGEN SATURATION: 100 % | SYSTOLIC BLOOD PRESSURE: 118 MMHG | RESPIRATION RATE: 17 BRPM | BODY MASS INDEX: 38.71 KG/M2 | HEIGHT: 61 IN | HEART RATE: 65 BPM | WEIGHT: 205.03 LBS | TEMPERATURE: 98.4 F | DIASTOLIC BLOOD PRESSURE: 68 MMHG

## 2024-03-19 DIAGNOSIS — S81.812A SKIN TEAR OF LEFT LOWER LEG WITHOUT COMPLICATION, INITIAL ENCOUNTER: Primary | ICD-10-CM

## 2024-03-19 PROCEDURE — 6370000000 HC RX 637 (ALT 250 FOR IP): Performed by: PHYSICIAN ASSISTANT

## 2024-03-19 PROCEDURE — 99283 EMERGENCY DEPT VISIT LOW MDM: CPT

## 2024-03-19 RX ORDER — CEPHALEXIN 500 MG/1
500 CAPSULE ORAL 4 TIMES DAILY
Qty: 28 CAPSULE | Refills: 0 | Status: SHIPPED | OUTPATIENT
Start: 2024-03-19 | End: 2024-03-26

## 2024-03-19 RX ORDER — ACETAMINOPHEN 500 MG
1000 TABLET ORAL ONCE
Status: COMPLETED | OUTPATIENT
Start: 2024-03-19 | End: 2024-03-19

## 2024-03-19 RX ADMIN — ACETAMINOPHEN 1000 MG: 500 TABLET ORAL at 14:40

## 2024-03-19 ASSESSMENT — PAIN SCALES - GENERAL
PAINLEVEL_OUTOF10: 6

## 2024-03-19 ASSESSMENT — PAIN DESCRIPTION - DESCRIPTORS: DESCRIPTORS: THROBBING

## 2024-03-19 ASSESSMENT — PAIN - FUNCTIONAL ASSESSMENT
PAIN_FUNCTIONAL_ASSESSMENT: 0-10
PAIN_FUNCTIONAL_ASSESSMENT: 0-10

## 2024-03-19 ASSESSMENT — PAIN DESCRIPTION - ORIENTATION: ORIENTATION: LEFT;LOWER

## 2024-03-19 ASSESSMENT — PAIN DESCRIPTION - LOCATION: LOCATION: LEG

## 2024-03-19 ASSESSMENT — PAIN DESCRIPTION - PAIN TYPE: TYPE: ACUTE PAIN

## 2024-03-19 NOTE — DISCHARGE INSTRUCTIONS
As instructed, bandage may stay in place for about 5 days before gentle removal of it, gentle washing and drying of skin, and placing another bandage again.  Use the antibiotic prescribed by mouth as written.  We recommend wound recheck with family doctor in about 7 to 10 days.  Return to the ER for any emergency worsening or concern.

## 2024-03-19 NOTE — ED PROVIDER NOTES
**ADVANCED PRACTICE PROVIDER, I HAVE EVALUATED THIS PATIENT**        [facility]  EMERGENCY DEPARTMENT ENCOUNTER      Pt Name: Fatuma Spencer  MRN:[id number]  Birthdate 1950  Date of evaluation: 3/19/2024  Provider: Aaron Warren PA-C  Note Started: 2:16 PM EDT 3/19/24        Chief Complaint: Skin tear        Nursing Notes, Past Medical Hx, Past Surgical Hx, Social Hx, Allergies, and Family Hx were all reviewed and agreed with or any disagreements were addressed in the HPI.    HPI: (Location, Duration, Timing, Severity, Quality, Assoc Sx, Context, Modifying factors)    History From: Patient          Chief Complaint of accidental injury to left shin that occurred within the last hour or so before coming in    This is a  73 y.o. female who presents stating that she had come out of a building from another appointment and accidentally hit the left shin on a piece of the car frame or trim when she was getting into her vehicle.  Peeled back for thin left shin skin and she started having some bleeding right away.  Because of this she came directly to the ER for further care and treatment.  States that she has minimal tenderness locally.  Has not gotten to clean this yet so far.  Would not mind getting some Tylenol for discomfort.    PastMedical/Surgical History:      Diagnosis Date    Adrenal adenoma 12/2007    left    Allergic rhinitis     Asthma     Cervical disc disorder with radiculopathy 1994    COPD (chronic obstructive pulmonary disease) ([facility])     COVID-19 10/2023    Deviated nasal septum 1989    s/p septoplasty    Diverticulosis     h/o abscess  s/p colon resection 11/07 Dr. 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

## 2024-03-19 NOTE — ED NOTES
Skin tear cleansed with soap and water and skin pulled into place. PSO applied, Mepitel applied and covered with abd pad and coban. Pt cole well.

## 2024-03-20 ENCOUNTER — CARE COORDINATION (OUTPATIENT)
Dept: CARE COORDINATION | Age: 74
End: 2024-03-20

## 2024-03-20 RX ORDER — DILTIAZEM HYDROCHLORIDE 180 MG/1
CAPSULE, EXTENDED RELEASE ORAL
Qty: 90 CAPSULE | Refills: 3 | Status: SHIPPED | OUTPATIENT
Start: 2024-03-20

## 2024-03-21 ENCOUNTER — CARE COORDINATION (OUTPATIENT)
Dept: CARE COORDINATION | Age: 74
End: 2024-03-21

## 2024-03-21 NOTE — CARE COORDINATION
[facility] ED Follow up Call    Reason for ED visit:  skin tear  Status:     improved    Did you call your PCP prior to going to the ED?  no    Did you receive a discharge instructions from the Emergency Room? yes  Review of Instructions:     Understands what to report/when to return?:  yes   Understands discharge instructions?:  yes   Following discharge instructions?:  yes    Are there any new complaints of pain? no  New Pain Meds? no   Constipation prophylaxis needed?  na    If you have a wound is the dressing clean, dry, and intact? yes    Are there any other complaints/concerns that you wish to tell your provider?   Will call if needs a appointment.    FU appts/Provider:    Future Appointments   Date Time Provider Department [place]   5/16/2024 10:00 AM SCHEDULE, [facility]   5/20/2024  1:00 PM Mauri Maciel MD [facility]   5/28/2024 11:30 AM Yamilex Watson MD [place] - [place]           New Medications?:   yes     Medication Reconciliation by phone - yes  Understands Medications?  yes  Taking Medications? yes  Can you swallow your pills?  yes    Any further needs in the home i.e. Equipment?  na    Link to services in community?:  declined cc needs

## 2024-03-28 RX ORDER — ALBUTEROL SULFATE 90 UG/1
2 AEROSOL, METERED RESPIRATORY (INHALATION) EVERY 4 HOURS PRN
Qty: 3 EACH | Refills: 1 | Status: SHIPPED | OUTPATIENT
Start: 2024-03-28

## 2024-03-29 PROCEDURE — 93296 REM INTERROG EVL PM/IDS: CPT | Performed by: INTERNAL MEDICINE

## 2024-03-29 PROCEDURE — 93295 DEV INTERROG REMOTE 1/2/MLT: CPT | Performed by: INTERNAL MEDICINE

## 2024-04-11 ENCOUNTER — TELEPHONE (OUTPATIENT)
Dept: FAMILY MEDICINE CLINIC | Age: 74
End: 2024-04-11

## 2024-04-11 RX ORDER — CHLORHEXIDINE GLUCONATE ORAL RINSE 1.2 MG/ML
15 SOLUTION DENTAL 2 TIMES DAILY
Qty: 420 ML | Refills: 0 | Status: SHIPPED | OUTPATIENT
Start: 2024-04-11 | End: 2024-04-25

## 2024-04-11 RX ORDER — TRIAMCINOLONE ACETONIDE 0.1 %
PASTE (GRAM) DENTAL
Qty: 5 G | Refills: 1 | Status: SHIPPED | OUTPATIENT
Start: 2024-04-11 | End: 2024-04-18

## 2024-04-11 NOTE — TELEPHONE ENCOUNTER
It looks as though I have given her chlorhexidine mouthwash.  I will also call in some Kenalog Orabase paste to put on the canker sores themselves.  If persisting problems to see us

## 2024-04-11 NOTE — TELEPHONE ENCOUNTER
Patient has 3 cancer sores and has been using warm salt water, with no relief.  She said you called in prescription mouth wash for her last year. She does not remember the name of it.   She is asking if you would please send to [facility] [place].    Please advise, [phone]

## 2024-04-29 DIAGNOSIS — F41.9 ANXIETY: ICD-10-CM

## 2024-04-29 RX ORDER — ALPRAZOLAM 0.25 MG/1
0.25 TABLET ORAL 2 TIMES DAILY PRN
Qty: 60 TABLET | Refills: 0 | Status: SHIPPED | OUTPATIENT
Start: 2024-04-29 | End: 2024-05-29

## 2024-05-15 DIAGNOSIS — F51.01 PRIMARY INSOMNIA: ICD-10-CM

## 2024-05-15 RX ORDER — ZOLPIDEM TARTRATE 10 MG/1
TABLET ORAL
Qty: 90 TABLET | Refills: 0 | Status: SHIPPED | OUTPATIENT
Start: 2024-05-15 | End: 2024-08-13

## 2024-05-17 NOTE — PROGRESS NOTES
suboptimal.      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   -Test results and treatment options discussed.   -Likely benign finding.   --Asymptomatic  -echo completed yesterday and showed no evidence of lipomatous hypertrophy  -physical exam with BLE edema and heart murmur  -BP controlled  -Labs reviewed 4/2024 [facility]. E's.   -no new cardiac recommendation  -continue current medications  -Continue to follow    2) 2/2 Ventricular Tachycardia s/p ICD   -She reports she was admitted to . E's 9/30/21 for chest pain and palpitations requiring cardioversion in the field 2/2 Vtach. EP consulted and ICD placed 10/5/21. NSTEMI s/p [facility] 10/1/21 showed patent coronary arteries, &, LVEF 50-55%. She was s/p monoclonal antibodies 9/28/21. She reports that she has recovered and continues to work on her deconditioning.  -Hx palpitations/SVT/PAC's-Improved on metoprolol per pt.     --asymptomatic   -working to stay active   -Last device check was from 3/2024 normal except few episodes of SVT/ST.   -no new cadiac recommendations  -continue medical therapy    3) LE swelling.   -echo reviewed 5/2024 and 10/2021  -Doubt related to CHF. Likely related to steroids and venous insufficiency.   -Advised to limit salt intake and elevate legs as much as possible.   -compression stockings  -continue to work with wound clinic for LLE for injury secondary to hitting leg on vehicle bumper.     4) COPD/Former Smoker.   -Ongoing tobacco avoidance discussed.   -[facility] 2021 no CAD  -LDLc 92 9/19/2023  -no on statin therapy.     Next follow up in one year.       Thank you very much for allowing me to participate in the care of your patient. Please do not hesitate to contact me if you have any questions.    Sincerely,  Mauri Maciel MD      [facility] [place]  [address], [address]   [place] [address]  Ph: [phone]  Fax: [phone]    This note was scribed in the presence of Dr. HINOJOSA

## 2024-05-23 DIAGNOSIS — E03.9 ACQUIRED HYPOTHYROIDISM: ICD-10-CM

## 2024-05-23 RX ORDER — LEVOTHYROXINE SODIUM 88 UG/1
TABLET ORAL
Qty: 90 TABLET | Refills: 1 | Status: SHIPPED | OUTPATIENT
Start: 2024-05-23

## 2024-05-24 ENCOUNTER — OFFICE VISIT (OUTPATIENT)
Dept: CARDIOLOGY CLINIC | Age: 74
End: 2024-05-24
Payer: MEDICARE

## 2024-05-24 VITALS
DIASTOLIC BLOOD PRESSURE: 78 MMHG | SYSTOLIC BLOOD PRESSURE: 120 MMHG | HEART RATE: 66 BPM | HEIGHT: 61 IN | OXYGEN SATURATION: 99 % | BODY MASS INDEX: 38.14 KG/M2 | WEIGHT: 202 LBS

## 2024-05-24 DIAGNOSIS — I51.7 CARDIAC HYPERTROPHY: Primary | ICD-10-CM

## 2024-05-24 DIAGNOSIS — Z95.810 DUAL ICD (IMPLANTABLE CARDIOVERTER-DEFIBRILLATOR) IN PLACE: ICD-10-CM

## 2024-05-24 DIAGNOSIS — I47.20 VT (VENTRICULAR TACHYCARDIA) (HCC): ICD-10-CM

## 2024-05-24 DIAGNOSIS — Z87.891 FORMER SMOKER: ICD-10-CM

## 2024-05-24 DIAGNOSIS — R60.0 LOWER EXTREMITY EDEMA: ICD-10-CM

## 2024-05-24 PROCEDURE — 3078F DIAST BP <80 MM HG: CPT | Performed by: INTERNAL MEDICINE

## 2024-05-24 PROCEDURE — G8417 CALC BMI ABV UP PARAM F/U: HCPCS | Performed by: INTERNAL MEDICINE

## 2024-05-24 PROCEDURE — 1124F ACP DISCUSS-NO DSCNMKR DOCD: CPT | Performed by: INTERNAL MEDICINE

## 2024-05-24 PROCEDURE — 3074F SYST BP LT 130 MM HG: CPT | Performed by: INTERNAL MEDICINE

## 2024-05-24 PROCEDURE — G8427 DOCREV CUR MEDS BY ELIG CLIN: HCPCS | Performed by: INTERNAL MEDICINE

## 2024-05-24 PROCEDURE — 99213 OFFICE O/P EST LOW 20 MIN: CPT | Performed by: INTERNAL MEDICINE

## 2024-05-24 PROCEDURE — 3017F COLORECTAL CA SCREEN DOC REV: CPT | Performed by: INTERNAL MEDICINE

## 2024-05-24 PROCEDURE — 1090F PRES/ABSN URINE INCON ASSESS: CPT | Performed by: INTERNAL MEDICINE

## 2024-05-24 PROCEDURE — 1036F TOBACCO NON-USER: CPT | Performed by: INTERNAL MEDICINE

## 2024-05-24 PROCEDURE — G8399 PT W/DXA RESULTS DOCUMENT: HCPCS | Performed by: INTERNAL MEDICINE

## 2024-05-28 ENCOUNTER — OFFICE VISIT (OUTPATIENT)
Dept: FAMILY MEDICINE CLINIC | Age: 74
End: 2024-05-28
Payer: MEDICARE

## 2024-05-28 VITALS
TEMPERATURE: 97.9 F | SYSTOLIC BLOOD PRESSURE: 114 MMHG | BODY MASS INDEX: 38.36 KG/M2 | WEIGHT: 203.2 LBS | OXYGEN SATURATION: 98 % | DIASTOLIC BLOOD PRESSURE: 72 MMHG | HEIGHT: 61 IN | HEART RATE: 59 BPM

## 2024-05-28 DIAGNOSIS — F51.01 PRIMARY INSOMNIA: Primary | ICD-10-CM

## 2024-05-28 DIAGNOSIS — F41.9 ANXIETY AND DEPRESSION: ICD-10-CM

## 2024-05-28 DIAGNOSIS — E03.9 ACQUIRED HYPOTHYROIDISM: ICD-10-CM

## 2024-05-28 DIAGNOSIS — I46.9 CARDIAC ARREST (HCC): ICD-10-CM

## 2024-05-28 DIAGNOSIS — C34.11 MALIGNANT NEOPLASM OF UPPER LOBE OF RIGHT LUNG (HCC): ICD-10-CM

## 2024-05-28 DIAGNOSIS — F32.A ANXIETY AND DEPRESSION: ICD-10-CM

## 2024-05-28 DIAGNOSIS — J44.9 COPD, SEVERE (HCC): ICD-10-CM

## 2024-05-28 DIAGNOSIS — D86.9 SARCOIDOSIS: ICD-10-CM

## 2024-05-28 DIAGNOSIS — I10 ESSENTIAL HYPERTENSION: ICD-10-CM

## 2024-05-28 PROCEDURE — G8417 CALC BMI ABV UP PARAM F/U: HCPCS | Performed by: INTERNAL MEDICINE

## 2024-05-28 PROCEDURE — 3017F COLORECTAL CA SCREEN DOC REV: CPT | Performed by: INTERNAL MEDICINE

## 2024-05-28 PROCEDURE — 1124F ACP DISCUSS-NO DSCNMKR DOCD: CPT | Performed by: INTERNAL MEDICINE

## 2024-05-28 PROCEDURE — 3023F SPIROM DOC REV: CPT | Performed by: INTERNAL MEDICINE

## 2024-05-28 PROCEDURE — 99213 OFFICE O/P EST LOW 20 MIN: CPT | Performed by: INTERNAL MEDICINE

## 2024-05-28 PROCEDURE — 1036F TOBACCO NON-USER: CPT | Performed by: INTERNAL MEDICINE

## 2024-05-28 PROCEDURE — 3078F DIAST BP <80 MM HG: CPT | Performed by: INTERNAL MEDICINE

## 2024-05-28 PROCEDURE — 3074F SYST BP LT 130 MM HG: CPT | Performed by: INTERNAL MEDICINE

## 2024-05-28 PROCEDURE — G8427 DOCREV CUR MEDS BY ELIG CLIN: HCPCS | Performed by: INTERNAL MEDICINE

## 2024-05-28 PROCEDURE — G8399 PT W/DXA RESULTS DOCUMENT: HCPCS | Performed by: INTERNAL MEDICINE

## 2024-05-28 PROCEDURE — 1090F PRES/ABSN URINE INCON ASSESS: CPT | Performed by: INTERNAL MEDICINE

## 2024-05-28 RX ORDER — AZITHROMYCIN 500 MG/1
TABLET, FILM COATED ORAL
Qty: 12 TABLET | Refills: 3
Start: 2024-05-28

## 2024-05-28 RX ORDER — ALPRAZOLAM 0.25 MG/1
0.25 TABLET ORAL 2 TIMES DAILY PRN
Qty: 60 TABLET | Refills: 2 | Status: SHIPPED | OUTPATIENT
Start: 2024-05-28 | End: 2024-08-26

## 2024-05-28 RX ORDER — BUSPIRONE HYDROCHLORIDE 15 MG/1
15 TABLET ORAL 3 TIMES DAILY
Qty: 270 TABLET | Refills: 1 | Status: SHIPPED | OUTPATIENT
Start: 2024-05-28

## 2024-05-28 SDOH — ECONOMIC STABILITY: FOOD INSECURITY: WITHIN THE PAST 12 MONTHS, YOU WORRIED THAT YOUR FOOD WOULD RUN OUT BEFORE YOU GOT MONEY TO BUY MORE.: NEVER TRUE

## 2024-05-28 SDOH — ECONOMIC STABILITY: FOOD INSECURITY: WITHIN THE PAST 12 MONTHS, THE FOOD YOU BOUGHT JUST DIDN'T LAST AND YOU DIDN'T HAVE MONEY TO GET MORE.: NEVER TRUE

## 2024-05-28 SDOH — ECONOMIC STABILITY: INCOME INSECURITY: HOW HARD IS IT FOR YOU TO PAY FOR THE VERY BASICS LIKE FOOD, HOUSING, MEDICAL CARE, AND HEATING?: NOT HARD AT ALL

## 2024-05-28 ASSESSMENT — PATIENT HEALTH QUESTIONNAIRE - PHQ9
6. FEELING BAD ABOUT YOURSELF - OR THAT YOU ARE A FAILURE OR HAVE LET YOURSELF OR YOUR FAMILY DOWN: NOT AT ALL
9. THOUGHTS THAT YOU WOULD BE BETTER OFF DEAD, OR OF HURTING YOURSELF: NOT AT ALL
10. IF YOU CHECKED OFF ANY PROBLEMS, HOW DIFFICULT HAVE THESE PROBLEMS MADE IT FOR YOU TO DO YOUR WORK, TAKE CARE OF THINGS AT HOME, OR GET ALONG WITH OTHER PEOPLE: NOT DIFFICULT AT ALL
SUM OF ALL RESPONSES TO PHQ QUESTIONS 1-9: 0
5. POOR APPETITE OR OVEREATING: NOT AT ALL
4. FEELING TIRED OR HAVING LITTLE ENERGY: NOT AT ALL
7. TROUBLE CONCENTRATING ON THINGS, SUCH AS READING THE NEWSPAPER OR WATCHING TELEVISION: NOT AT ALL
SUM OF ALL RESPONSES TO PHQ9 QUESTIONS 1 & 2: 0
3. TROUBLE FALLING OR STAYING ASLEEP: NOT AT ALL
SUM OF ALL RESPONSES TO PHQ QUESTIONS 1-9: 0
2. FEELING DOWN, DEPRESSED OR HOPELESS: NOT AT ALL
SUM OF ALL RESPONSES TO PHQ QUESTIONS 1-9: 0
SUM OF ALL RESPONSES TO PHQ QUESTIONS 1-9: 0
8. MOVING OR SPEAKING SO SLOWLY THAT OTHER PEOPLE COULD HAVE NOTICED. OR THE OPPOSITE, BEING SO FIGETY OR RESTLESS THAT YOU HAVE BEEN MOVING AROUND A LOT MORE THAN USUAL: NOT AT ALL
1. LITTLE INTEREST OR PLEASURE IN DOING THINGS: NOT AT ALL

## 2024-05-28 NOTE — PROGRESS NOTES
SUBJECTIVE:  Fatuma Spencer is a 73 y.o. female being evaluated for:    Chief Complaint   Patient presents with    Hypertension       HPI   Ct from gs has been good breathing is good  Just saw dr ellis and echo was good   Sarcoid in eye and stable   Depressed anxious and trouble falling asleep but once asleep all is well   Allergies   Allergen Reactions    Atarax [Hydroxyzine] Hives    Codeine Other (See Comments)     Makes her feel hyper    Sulfa Antibiotics Nausea Only     Current Outpatient Medications   Medication Sig Dispense Refill    azithromycin (ZITHROMAX) 500 MG tablet Three times a week 12 tablet 3    ALPRAZolam (XANAX) 0.25 MG tablet Take 1 tablet by mouth 2 times daily as needed for Anxiety for up to 90 days. Max Daily Amount: 0.5 mg 60 tablet 2    busPIRone (BUSPAR) 15 MG tablet Take 15 mg by mouth 3 times daily 270 tablet 1    levothyroxine (SYNTHROID) 88 MCG tablet TAKE 1 TABLET EVERY DAY 90 tablet 1    zolpidem (AMBIEN) 10 MG tablet TAKE 1 TABLET NIGHTLY AS NEEDED FOR SLEEP. MAX DAILY AMOUNT: 10MG 90 tablet 0    albuterol sulfate HFA (PROVENTIL;VENTOLIN;PROAIR) 108 (90 Base) MCG/ACT inhaler INHALE 2 PUFFS INTO THE LUNGS EVERY 4 HOURS AS NEEDED FOR WHEEZING 3 each 1    dilTIAZem (DILACOR XR) 180 MG extended release capsule TAKE 1 CAPSULE EVERY DAY 90 capsule 3    fluticasone (FLONASE) 50 MCG/ACT nasal spray 1 spray by Each Nostril route daily 16 g 2    ibuprofen (ADVIL;MOTRIN) 800 MG tablet Take 1 tablet by mouth every 8 hours as needed      promethazine (PHENERGAN) 25 MG tablet Take 1 tablet by mouth every 6 hours as needed for nausea      metoprolol tartrate (LOPRESSOR) 25 MG tablet Take 1 tablet by mouth 2 times daily 180 tablet 1    montelukast (SINGULAIR) 10 MG tablet Take 1 tablet by mouth nightly      cyclobenzaprine (FLEXERIL) 10 MG tablet Take 1 tablet by mouth as needed for Muscle spasms      furosemide (LASIX) 20 MG tablet Take 1 tablet by mouth once daily (Patient taking differently: as

## 2024-05-31 ASSESSMENT — ENCOUNTER SYMPTOMS
VOMITING: 0
SHORTNESS OF BREATH: 1
ABDOMINAL PAIN: 0
NAUSEA: 0
BLOOD IN STOOL: 0
COUGH: 1
WHEEZING: 0
DIARRHEA: 0

## 2024-06-24 RX ORDER — ALBUTEROL SULFATE 90 UG/1
2 AEROSOL, METERED RESPIRATORY (INHALATION) EVERY 4 HOURS PRN
Qty: 3 EACH | Refills: 1 | Status: SHIPPED | OUTPATIENT
Start: 2024-06-24

## 2024-07-19 RX ORDER — CHLORHEXIDINE GLUCONATE ORAL RINSE 1.2 MG/ML
SOLUTION DENTAL
Qty: 473 ML | Refills: 0 | OUTPATIENT
Start: 2024-07-19

## 2024-08-02 DIAGNOSIS — F51.01 PRIMARY INSOMNIA: ICD-10-CM

## 2024-08-02 RX ORDER — ZOLPIDEM TARTRATE 10 MG/1
TABLET ORAL
Qty: 90 TABLET | OUTPATIENT
Start: 2024-08-02

## 2024-08-07 RX ORDER — ZOLPIDEM TARTRATE 10 MG/1
10 TABLET ORAL NIGHTLY PRN
Qty: 90 TABLET | Refills: 0 | Status: SHIPPED | OUTPATIENT
Start: 2024-08-07 | End: 2024-11-05

## 2024-08-07 NOTE — TELEPHONE ENCOUNTER
Patient called to see why her Ambien was denied.  She asked for medication a little early, but it takes awhile for the mail orders.      Please call, [phone].

## 2024-09-20 RX ORDER — ALBUTEROL SULFATE 90 UG/1
2 INHALANT RESPIRATORY (INHALATION) EVERY 4 HOURS PRN
Qty: 3 EACH | Refills: 1 | Status: SHIPPED | OUTPATIENT
Start: 2024-09-20

## 2024-09-26 ENCOUNTER — TELEPHONE (OUTPATIENT)
Dept: FAMILY MEDICINE CLINIC | Age: 74
End: 2024-09-26

## 2024-09-26 ENCOUNTER — OFFICE VISIT (OUTPATIENT)
Dept: FAMILY MEDICINE CLINIC | Age: 74
End: 2024-09-26

## 2024-09-26 VITALS
HEART RATE: 64 BPM | OXYGEN SATURATION: 98 % | SYSTOLIC BLOOD PRESSURE: 130 MMHG | HEIGHT: 61 IN | WEIGHT: 207.6 LBS | RESPIRATION RATE: 18 BRPM | TEMPERATURE: 97.4 F | DIASTOLIC BLOOD PRESSURE: 80 MMHG | BODY MASS INDEX: 39.2 KG/M2

## 2024-09-26 DIAGNOSIS — F32.A ANXIETY AND DEPRESSION: ICD-10-CM

## 2024-09-26 DIAGNOSIS — Z23 FLU VACCINE NEED: ICD-10-CM

## 2024-09-26 DIAGNOSIS — D86.9 SARCOIDOSIS: ICD-10-CM

## 2024-09-26 DIAGNOSIS — R73.03 PRE-DIABETES: ICD-10-CM

## 2024-09-26 DIAGNOSIS — I10 ESSENTIAL HYPERTENSION: Primary | ICD-10-CM

## 2024-09-26 DIAGNOSIS — R41.89 COGNITIVE IMPAIRMENT: Primary | ICD-10-CM

## 2024-09-26 DIAGNOSIS — E05.00 GRAVES DISEASE: ICD-10-CM

## 2024-09-26 DIAGNOSIS — E66.01 CLASS 2 SEVERE OBESITY DUE TO EXCESS CALORIES WITH SERIOUS COMORBIDITY AND BODY MASS INDEX (BMI) OF 39.0 TO 39.9 IN ADULT: ICD-10-CM

## 2024-09-26 DIAGNOSIS — F41.9 ANXIETY AND DEPRESSION: ICD-10-CM

## 2024-09-26 RX ORDER — ALPRAZOLAM 0.25 MG
0.25 TABLET ORAL 2 TIMES DAILY PRN
Qty: 60 TABLET | Refills: 2 | Status: SHIPPED | OUTPATIENT
Start: 2024-09-26 | End: 2024-12-25

## 2024-09-26 RX ORDER — PHENTERMINE HYDROCHLORIDE 37.5 MG/1
37.5 TABLET ORAL
Qty: 30 TABLET | Refills: 0 | Status: SHIPPED | OUTPATIENT
Start: 2024-09-26 | End: 2024-10-26

## 2024-10-18 DIAGNOSIS — E03.9 ACQUIRED HYPOTHYROIDISM: ICD-10-CM

## 2024-10-18 RX ORDER — BUSPIRONE HYDROCHLORIDE 15 MG/1
15 TABLET ORAL 3 TIMES DAILY
Qty: 270 TABLET | Refills: 0 | Status: SHIPPED | OUTPATIENT
Start: 2024-10-18

## 2024-10-18 RX ORDER — LEVOTHYROXINE SODIUM 88 UG/1
TABLET ORAL
Qty: 90 TABLET | Refills: 0 | Status: SHIPPED | OUTPATIENT
Start: 2024-10-18

## 2024-10-24 ENCOUNTER — PATIENT MESSAGE (OUTPATIENT)
Dept: FAMILY MEDICINE CLINIC | Age: 74
End: 2024-10-24

## 2024-10-24 DIAGNOSIS — F51.01 PRIMARY INSOMNIA: ICD-10-CM

## 2024-10-27 RX ORDER — ZOLPIDEM TARTRATE 10 MG/1
10 TABLET ORAL NIGHTLY PRN
Qty: 90 TABLET | Refills: 0 | Status: SHIPPED | OUTPATIENT
Start: 2024-10-27 | End: 2025-01-25

## 2024-12-16 ENCOUNTER — TELEPHONE (OUTPATIENT)
Dept: FAMILY MEDICINE CLINIC | Age: 74
End: 2024-12-16

## 2024-12-16 RX ORDER — DEXTROMETHORPHAN HYDROBROMIDE AND PROMETHAZINE HYDROCHLORIDE 15; 6.25 MG/5ML; MG/5ML
5 SYRUP ORAL 4 TIMES DAILY PRN
Qty: 180 ML | Refills: 0 | Status: SHIPPED | OUTPATIENT
Start: 2024-12-16 | End: 2024-12-25

## 2024-12-16 NOTE — TELEPHONE ENCOUNTER
Pt was discharged last night from [facility]. Pt was admitted for COVID and pneumonia. Pt has a horrible cough but wasn't prescribed a cough medicine. Pt wants to know if Dr Watson will prescribe her a cough med? Pt uses [facility] in [place]. Pt has a 3 mo f/u on 12/30.

## 2024-12-19 RX ORDER — ALBUTEROL SULFATE 90 UG/1
2 INHALANT RESPIRATORY (INHALATION) EVERY 4 HOURS PRN
Qty: 3 EACH | Refills: 1 | Status: SHIPPED | OUTPATIENT
Start: 2024-12-19

## 2024-12-30 ENCOUNTER — OFFICE VISIT (OUTPATIENT)
Dept: FAMILY MEDICINE CLINIC | Age: 74
End: 2024-12-30

## 2024-12-30 VITALS
OXYGEN SATURATION: 96 % | SYSTOLIC BLOOD PRESSURE: 110 MMHG | TEMPERATURE: 98 F | WEIGHT: 208.6 LBS | HEART RATE: 80 BPM | BODY MASS INDEX: 39.38 KG/M2 | DIASTOLIC BLOOD PRESSURE: 66 MMHG | HEIGHT: 61 IN

## 2024-12-30 DIAGNOSIS — M35.01 SJOGREN SYNDROME WITH KERATOCONJUNCTIVITIS (HCC): ICD-10-CM

## 2024-12-30 DIAGNOSIS — J44.1 COPD EXACERBATION (HCC): ICD-10-CM

## 2024-12-30 DIAGNOSIS — K12.30 MUCOSITIS: ICD-10-CM

## 2024-12-30 DIAGNOSIS — R09.1 PLEURISY: ICD-10-CM

## 2024-12-30 DIAGNOSIS — R60.0 BILATERAL LEG EDEMA: ICD-10-CM

## 2024-12-30 DIAGNOSIS — J18.9 PNEUMONIA OF RIGHT UPPER LOBE DUE TO INFECTIOUS ORGANISM: Primary | ICD-10-CM

## 2024-12-30 DIAGNOSIS — S41.101D WOUND OF RIGHT UPPER EXTREMITY, SUBSEQUENT ENCOUNTER: ICD-10-CM

## 2024-12-30 DIAGNOSIS — F51.01 PRIMARY INSOMNIA: ICD-10-CM

## 2024-12-30 DIAGNOSIS — U07.1 COVID: ICD-10-CM

## 2024-12-30 RX ORDER — ENSIFENTRINE 3 MG/2.5ML
SUSPENSION RESPIRATORY (INHALATION) 2 TIMES DAILY
COMMUNITY

## 2024-12-30 RX ORDER — MUPIROCIN 20 MG/G
OINTMENT TOPICAL DAILY
COMMUNITY
Start: 2024-07-26

## 2024-12-30 RX ORDER — GUAIFENESIN 600 MG/1
1200 TABLET, EXTENDED RELEASE ORAL 2 TIMES DAILY
COMMUNITY
Start: 2024-12-04 | End: 2024-12-30 | Stop reason: CLARIF

## 2024-12-30 RX ORDER — ALPRAZOLAM 0.25 MG/1
0.25 TABLET ORAL 2 TIMES DAILY PRN
COMMUNITY

## 2024-12-30 RX ORDER — ZOLPIDEM TARTRATE 10 MG/1
10 TABLET ORAL NIGHTLY PRN
Qty: 90 TABLET | Refills: 0 | Status: SHIPPED | OUTPATIENT
Start: 2024-12-30 | End: 2025-03-30

## 2024-12-30 NOTE — PROGRESS NOTES
SUBJECTIVE:  Fatuma Spencer is a 74 y.o. female being evaluated for:    Chief Complaint   Patient presents with    Anxiety     Pt is here for follow-up Anxiety/Depression for refills on Xanax.    Insomnia     Follow up for refills on Ambien       HPI   Been sick since Thanksgiving  Following with Dr Perez  Hospitalized at  on 12/2 for pneumonia with a right lobe infiltrate left bailar infiltrate, treated with iv antibiotic and sent home on oral levoquin  Readmitted one week later 12/12 with COVID and pneumonia, treated with steroids and Remdesivir  Still feeling poorly  coughing  unable to get a lot up  Using her nebulizer tid  on Ohtuvyre  Coughing  not productive unable to get stuff up Legs doing terribly swelling  going to wound clinic to take lasix every day  No wounds on legs  Right arm fell and tore arm in the bathroom laid on the floor for 5 hours Not red or or draing  packed with medication   [facility]   60 % beter pleurisy all through chest between shoulder blades coughng all night  cough medication is helping Phergan dm   Horrible sores in her mouth on folinic acid once a day mouth paste not helping    Current Outpatient Medications   Medication Sig Dispense Refill    ALPRAZolam (XANAX) 0.25 MG tablet Take 1 tablet by mouth 2 times daily as needed.      mupirocin (BACTROBAN) 2 % ointment Apply topically daily      Dextromethorphan-guaiFENesin (MUCINEX DM MAXIMUM STRENGTH PO) Take 1 tablet by mouth 2 times daily as needed      Ensifentrine (OHTUVAYRE) 3 MG/2.5ML SUSP Inhale into the lungs 2 times daily      zolpidem (AMBIEN) 10 MG tablet Take 1 tablet by mouth nightly as needed for Sleep for up to 90 days. Max Daily Amount: 10 mg 90 tablet 0    albuterol sulfate HFA (PROVENTIL;VENTOLIN;PROAIR) 108 (90 Base) MCG/ACT inhaler INHALE 2 PUFFS INTO THE LUNGS EVERY 4 HOURS AS NEEDED FOR WHEEZING 3 each 1    metoprolol tartrate (LOPRESSOR) 25 MG tablet TAKE 1

## 2025-01-02 DIAGNOSIS — E03.9 ACQUIRED HYPOTHYROIDISM: ICD-10-CM

## 2025-01-02 RX ORDER — LEVOTHYROXINE SODIUM 88 UG/1
TABLET ORAL
Qty: 90 TABLET | Refills: 0 | Status: SHIPPED | OUTPATIENT
Start: 2025-01-02

## 2025-01-02 RX ORDER — BUSPIRONE HYDROCHLORIDE 15 MG/1
15 TABLET ORAL 3 TIMES DAILY
Qty: 270 TABLET | Refills: 1 | Status: SHIPPED | OUTPATIENT
Start: 2025-01-02

## 2025-01-02 RX ORDER — DILTIAZEM HYDROCHLORIDE 180 MG/1
CAPSULE, EXTENDED RELEASE ORAL
Qty: 90 CAPSULE | Refills: 1 | Status: SHIPPED | OUTPATIENT
Start: 2025-01-02

## 2025-01-02 RX ORDER — DEXTROMETHORPHAN HYDROBROMIDE AND PROMETHAZINE HYDROCHLORIDE 15; 6.25 MG/5ML; MG/5ML
SYRUP ORAL
Qty: 180 ML | Refills: 0 | OUTPATIENT
Start: 2025-01-02

## 2025-01-04 RX ORDER — CHLORHEXIDINE GLUCONATE ORAL RINSE 1.2 MG/ML
15 SOLUTION DENTAL 2 TIMES DAILY
Qty: 420 ML | Refills: 0 | Status: SHIPPED | OUTPATIENT
Start: 2025-01-04 | End: 2025-01-18

## 2025-01-04 ASSESSMENT — ENCOUNTER SYMPTOMS
WHEEZING: 1
CHEST TIGHTNESS: 1
COUGH: 1
ABDOMINAL PAIN: 0
SINUS PRESSURE: 0
SHORTNESS OF BREATH: 1
DIARRHEA: 0
VOMITING: 0
NAUSEA: 0

## 2025-01-19 DIAGNOSIS — F51.01 PRIMARY INSOMNIA: ICD-10-CM

## 2025-01-20 RX ORDER — ZOLPIDEM TARTRATE 10 MG/1
TABLET ORAL
Qty: 90 TABLET | OUTPATIENT
Start: 2025-01-20

## 2025-01-20 NOTE — TELEPHONE ENCOUNTER
1/20 Left Voicemail (1st Attempt) and Sent Mychart (1st Attempt) for the patient to call back and schedule the following:    Appointment type: Return Hidradenitis Suppura  Provider: Joseph  Return date: 2/27/2025 at 4:30 pm  Specialty phone number: [phone]  Additional appointment(s) needed: n/a  Additonal Notes: n/a    Refill sent

## 2025-03-16 DIAGNOSIS — E03.9 ACQUIRED HYPOTHYROIDISM: ICD-10-CM

## 2025-03-17 RX ORDER — ALBUTEROL SULFATE 90 UG/1
2 INHALANT RESPIRATORY (INHALATION) EVERY 4 HOURS PRN
Qty: 3 EACH | Refills: 1 | Status: SHIPPED | OUTPATIENT
Start: 2025-03-17

## 2025-03-17 RX ORDER — LEVOTHYROXINE SODIUM 88 UG/1
88 TABLET ORAL DAILY
Qty: 90 TABLET | Refills: 0 | Status: SHIPPED | OUTPATIENT
Start: 2025-03-17

## 2025-03-31 ENCOUNTER — OFFICE VISIT (OUTPATIENT)
Dept: FAMILY MEDICINE CLINIC | Age: 75
End: 2025-03-31

## 2025-03-31 VITALS
BODY MASS INDEX: 38.59 KG/M2 | SYSTOLIC BLOOD PRESSURE: 118 MMHG | RESPIRATION RATE: 18 BRPM | HEIGHT: 61 IN | TEMPERATURE: 97.9 F | WEIGHT: 204.4 LBS | HEART RATE: 68 BPM | DIASTOLIC BLOOD PRESSURE: 72 MMHG | OXYGEN SATURATION: 96 %

## 2025-03-31 DIAGNOSIS — F32.A ANXIETY AND DEPRESSION: ICD-10-CM

## 2025-03-31 DIAGNOSIS — J44.1 COPD EXACERBATION (HCC): Primary | ICD-10-CM

## 2025-03-31 DIAGNOSIS — I47.20 VT (VENTRICULAR TACHYCARDIA) (HCC): ICD-10-CM

## 2025-03-31 DIAGNOSIS — F51.01 PRIMARY INSOMNIA: ICD-10-CM

## 2025-03-31 DIAGNOSIS — C34.11 MALIGNANT NEOPLASM OF UPPER LOBE OF RIGHT LUNG (HCC): ICD-10-CM

## 2025-03-31 DIAGNOSIS — J44.9 STAGE 4 VERY SEVERE COPD BY GOLD CLASSIFICATION (HCC): ICD-10-CM

## 2025-03-31 DIAGNOSIS — Z91.81 AT HIGH RISK FOR FALLS: ICD-10-CM

## 2025-03-31 DIAGNOSIS — F41.9 ANXIETY AND DEPRESSION: ICD-10-CM

## 2025-03-31 RX ORDER — ZOLPIDEM TARTRATE 10 MG/1
10 TABLET ORAL NIGHTLY PRN
Qty: 90 TABLET | Refills: 0 | Status: SHIPPED | OUTPATIENT
Start: 2025-03-31 | End: 2025-06-29

## 2025-03-31 RX ORDER — ALPRAZOLAM 0.25 MG
0.25 TABLET ORAL 2 TIMES DAILY PRN
Qty: 60 TABLET | Refills: 0 | Status: SHIPPED | OUTPATIENT
Start: 2025-03-31 | End: 2025-04-30

## 2025-03-31 RX ORDER — BUSPIRONE HYDROCHLORIDE 5 MG/1
5 TABLET ORAL 3 TIMES DAILY PRN
Qty: 90 TABLET | Refills: 1 | Status: SHIPPED | OUTPATIENT
Start: 2025-03-31 | End: 2025-05-30

## 2025-03-31 RX ORDER — DESVENLAFAXINE 50 MG/1
50 TABLET, FILM COATED, EXTENDED RELEASE ORAL DAILY
Qty: 90 TABLET | Refills: 1 | Status: SHIPPED | OUTPATIENT
Start: 2025-03-31

## 2025-03-31 SDOH — ECONOMIC STABILITY: FOOD INSECURITY: WITHIN THE PAST 12 MONTHS, THE FOOD YOU BOUGHT JUST DIDN'T LAST AND YOU DIDN'T HAVE MONEY TO GET MORE.: NEVER TRUE

## 2025-03-31 SDOH — ECONOMIC STABILITY: FOOD INSECURITY: WITHIN THE PAST 12 MONTHS, YOU WORRIED THAT YOUR FOOD WOULD RUN OUT BEFORE YOU GOT MONEY TO BUY MORE.: NEVER TRUE

## 2025-03-31 ASSESSMENT — PATIENT HEALTH QUESTIONNAIRE - PHQ9
SUM OF ALL RESPONSES TO PHQ QUESTIONS 1-9: 0
7. TROUBLE CONCENTRATING ON THINGS, SUCH AS READING THE NEWSPAPER OR WATCHING TELEVISION: NOT AT ALL
2. FEELING DOWN, DEPRESSED OR HOPELESS: NOT AT ALL
SUM OF ALL RESPONSES TO PHQ QUESTIONS 1-9: 0
1. LITTLE INTEREST OR PLEASURE IN DOING THINGS: NOT AT ALL
4. FEELING TIRED OR HAVING LITTLE ENERGY: NOT AT ALL
SUM OF ALL RESPONSES TO PHQ QUESTIONS 1-9: 0
6. FEELING BAD ABOUT YOURSELF - OR THAT YOU ARE A FAILURE OR HAVE LET YOURSELF OR YOUR FAMILY DOWN: NOT AT ALL
8. MOVING OR SPEAKING SO SLOWLY THAT OTHER PEOPLE COULD HAVE NOTICED. OR THE OPPOSITE, BEING SO FIGETY OR RESTLESS THAT YOU HAVE BEEN MOVING AROUND A LOT MORE THAN USUAL: NOT AT ALL
SUM OF ALL RESPONSES TO PHQ QUESTIONS 1-9: 0
9. THOUGHTS THAT YOU WOULD BE BETTER OFF DEAD, OR OF HURTING YOURSELF: NOT AT ALL
10. IF YOU CHECKED OFF ANY PROBLEMS, HOW DIFFICULT HAVE THESE PROBLEMS MADE IT FOR YOU TO DO YOUR WORK, TAKE CARE OF THINGS AT HOME, OR GET ALONG WITH OTHER PEOPLE: NOT DIFFICULT AT ALL
3. TROUBLE FALLING OR STAYING ASLEEP: NOT AT ALL
5. POOR APPETITE OR OVEREATING: NOT AT ALL

## 2025-03-31 NOTE — PROGRESS NOTES
infection, during which C. difficile developed and was treated with vancomycin. No current diarrhea is reported. Several bronchoscopies and CAT scans have been performed. Prolonged steroid use and severe bronchospasm are noted.    Currently, all medications for sarcoidosis have been discontinued, and a follow-up with JEREMIE is scheduled soon. Medications were stopped due to pneumonias. Sarcoidosis management is reported as poor, causing daily discomfort in the right eye. An appointment with Dr. CHAO is scheduled for Thursday. Remicade and methotrexate were not effective in managing sarcoidosis.    A history of cancer in the right upper lobe is noted, which is currently in remission following radiation treatment.     Current Outpatient Medications on File Prior to Visit   Medication Sig Dispense Refill    albuterol sulfate HFA (PROVENTIL;VENTOLIN;PROAIR) 108 (90 Base) MCG/ACT inhaler INHALE 2 PUFFS INTO THE LUNGS EVERY 4 HOURS AS NEEDED FOR WHEEZING 3 each 1    levothyroxine (SYNTHROID) 88 MCG tablet TAKE 1 TABLET EVERY DAY 90 tablet 0    busPIRone (BUSPAR) 15 MG tablet TAKE 1 TABLET THREE TIMES DAILY 270 tablet 1    dilTIAZem (DILACOR XR) 180 MG extended release capsule TAKE 1 CAPSULE EVERY DAY 90 capsule 1    mupirocin (BACTROBAN) 2 % ointment Apply topically daily      Dextromethorphan-guaiFENesin (MUCINEX DM MAXIMUM STRENGTH PO) Take 1 tablet by mouth 2 times daily as needed      Ensifentrine (OHTUVAYRE) 3 MG/2.5ML SUSP Inhale into the lungs 2 times daily      metoprolol tartrate (LOPRESSOR) 25 MG tablet TAKE 1 TABLET TWICE DAILY 180 tablet 3    fluticasone (FLONASE) 50 MCG/ACT nasal spray 1 spray by Each Nostril route daily 16 g 2    ibuprofen (ADVIL;MOTRIN) 800 MG tablet Take 1 tablet by mouth every 8 hours as needed      promethazine (PHENERGAN) 25 MG tablet Take 1 tablet by mouth every 6 hours as needed for nausea      montelukast (SINGULAIR) 10 MG tablet Take 1 tablet by mouth nightly      cyclobenzaprine

## 2025-04-01 ASSESSMENT — ENCOUNTER SYMPTOMS
NAUSEA: 0
SINUS PRESSURE: 0
DIARRHEA: 0
SHORTNESS OF BREATH: 1
WHEEZING: 1
EYE REDNESS: 1
VOMITING: 0
COUGH: 1
ABDOMINAL PAIN: 0

## 2025-04-04 DIAGNOSIS — F51.01 PRIMARY INSOMNIA: ICD-10-CM

## 2025-04-06 RX ORDER — ZOLPIDEM TARTRATE 10 MG/1
TABLET ORAL
Qty: 90 TABLET | OUTPATIENT
Start: 2025-04-06

## 2025-04-07 ENCOUNTER — TELEPHONE (OUTPATIENT)
Dept: FAMILY MEDICINE CLINIC | Age: 75
End: 2025-04-07

## 2025-04-07 NOTE — TELEPHONE ENCOUNTER
Called pt and LM informing to call office back.    Ambien was sent in on 3/31/25 to [facility] for 90 day supply.   Pt needs to call pharmacy to have them fill this.

## 2025-04-07 NOTE — TELEPHONE ENCOUNTER
Patient called she called [facility] to get her Ambien and they told her you denied it..      Please call,  [phone]

## 2025-04-09 DIAGNOSIS — F41.9 ANXIETY AND DEPRESSION: ICD-10-CM

## 2025-04-09 DIAGNOSIS — F32.A ANXIETY AND DEPRESSION: ICD-10-CM

## 2025-04-09 RX ORDER — ALPRAZOLAM 0.25 MG
TABLET ORAL
Refills: 0 | OUTPATIENT
Start: 2025-04-09

## 2025-04-10 RX ORDER — IBUPROFEN 800 MG/1
800 TABLET, FILM COATED ORAL EVERY 8 HOURS PRN
Qty: 270 TABLET | Refills: 0 | Status: SHIPPED | OUTPATIENT
Start: 2025-04-10

## 2025-04-10 RX ORDER — FUROSEMIDE 20 MG/1
20 TABLET ORAL DAILY
Qty: 90 TABLET | Refills: 1 | Status: SHIPPED | OUTPATIENT
Start: 2025-04-10

## 2025-04-14 RX ORDER — METOPROLOL TARTRATE 25 MG/1
25 TABLET, FILM COATED ORAL 2 TIMES DAILY
Qty: 180 TABLET | Refills: 3 | Status: SHIPPED | OUTPATIENT
Start: 2025-04-14

## 2025-05-09 ENCOUNTER — OFFICE VISIT (OUTPATIENT)
Dept: CARDIOLOGY CLINIC | Age: 75
End: 2025-05-09
Payer: MEDICARE

## 2025-05-09 VITALS
SYSTOLIC BLOOD PRESSURE: 116 MMHG | WEIGHT: 207 LBS | DIASTOLIC BLOOD PRESSURE: 70 MMHG | HEIGHT: 61 IN | BODY MASS INDEX: 39.08 KG/M2 | OXYGEN SATURATION: 96 % | HEART RATE: 64 BPM

## 2025-05-09 DIAGNOSIS — E78.5 HYPERLIPIDEMIA, UNSPECIFIED HYPERLIPIDEMIA TYPE: ICD-10-CM

## 2025-05-09 DIAGNOSIS — Z95.810 DUAL ICD (IMPLANTABLE CARDIOVERTER-DEFIBRILLATOR) IN PLACE: ICD-10-CM

## 2025-05-09 DIAGNOSIS — R07.89 CHEST TIGHTNESS: ICD-10-CM

## 2025-05-09 DIAGNOSIS — I51.7 CARDIAC HYPERTROPHY: ICD-10-CM

## 2025-05-09 DIAGNOSIS — R60.0 LOWER EXTREMITY EDEMA: ICD-10-CM

## 2025-05-09 DIAGNOSIS — Z87.891 FORMER SMOKER: ICD-10-CM

## 2025-05-09 DIAGNOSIS — I10 ESSENTIAL HYPERTENSION: Primary | ICD-10-CM

## 2025-05-09 DIAGNOSIS — I47.20 VT (VENTRICULAR TACHYCARDIA) (HCC): ICD-10-CM

## 2025-05-09 PROCEDURE — 93000 ELECTROCARDIOGRAM COMPLETE: CPT | Performed by: INTERNAL MEDICINE

## 2025-05-09 PROCEDURE — 1124F ACP DISCUSS-NO DSCNMKR DOCD: CPT | Performed by: INTERNAL MEDICINE

## 2025-05-09 PROCEDURE — 3074F SYST BP LT 130 MM HG: CPT | Performed by: INTERNAL MEDICINE

## 2025-05-09 PROCEDURE — G2211 COMPLEX E/M VISIT ADD ON: HCPCS | Performed by: INTERNAL MEDICINE

## 2025-05-09 PROCEDURE — 3078F DIAST BP <80 MM HG: CPT | Performed by: INTERNAL MEDICINE

## 2025-05-09 PROCEDURE — G8427 DOCREV CUR MEDS BY ELIG CLIN: HCPCS | Performed by: INTERNAL MEDICINE

## 2025-05-09 PROCEDURE — 3017F COLORECTAL CA SCREEN DOC REV: CPT | Performed by: INTERNAL MEDICINE

## 2025-05-09 PROCEDURE — 1090F PRES/ABSN URINE INCON ASSESS: CPT | Performed by: INTERNAL MEDICINE

## 2025-05-09 PROCEDURE — G8417 CALC BMI ABV UP PARAM F/U: HCPCS | Performed by: INTERNAL MEDICINE

## 2025-05-09 PROCEDURE — 1036F TOBACCO NON-USER: CPT | Performed by: INTERNAL MEDICINE

## 2025-05-09 PROCEDURE — 99214 OFFICE O/P EST MOD 30 MIN: CPT | Performed by: INTERNAL MEDICINE

## 2025-05-09 PROCEDURE — G8399 PT W/DXA RESULTS DOCUMENT: HCPCS | Performed by: INTERNAL MEDICINE

## 2025-05-09 PROCEDURE — 1159F MED LIST DOCD IN RCRD: CPT | Performed by: INTERNAL MEDICINE

## 2025-05-09 NOTE — PROGRESS NOTES
[facility]      Cardiology Progress Note    Fatuma Spencer  1950    May 9, 2025    Referring Physician: Dr. Yamilex Watson  Reason for Referral: Abnormal CT scan    CC: \"I have a feeling like when out in cold and breathing, it happens at night and with exertion.\"    HPI:  The patient is 74 y.o. female with a past medical history significant for PAC's, SVT, adrenal adenoma, COPD, Graves dz s/p SCHMITT now hypothyroid, ulcer, anxiety, depression and prior tobacco use. She presents 21 for evaluation of abnormal CT lung that showed lipomatous hypertrophy of atrial septum. She states that the CT scan was done yearly for evaluation of her COPD. She reports feet swelling at night. She states that since her  , she has a lot of anxiety and can feel her heart racing at night. She denies associated symptoms.Her PCP prescribed metoprolol which improved her palpitations.  She denies worsening chronic dyspnea. The patient denies exertional chest pain, dizziness, syncope, PND, orthopnea and fever. She has been on chronic steroid therapy since 2021 and reports associated bruising.     Returns today for a 6 month follow up. Echo completed 2024 stable at this time. 24 Overall she reports she is doing and feeling well from a cardiac standpoint with no specific cardiac complaints.  She is \"tyring\" to stay active. She currently has a cut on her left leg due to hitting it on the bumper of her vehicle. This is being managed per would clinic. She notes that she is on long term steroid therapy. Continues with chronic edema BLE.     Today, she reports her asthma and COPD issues but reports \"I have a feeling like when out in cold and breathing, a burning breathlessness. it happens at night and with exertion. If I just sit, it will go away.\" She currently denies sour taste. 2024 to 2025 she had pneumonia x2 and bronchitis x1. Saw Dr. Perez one month ago with working to get a new

## 2025-05-09 NOTE — PATIENT INSTRUCTIONS
1) Add fasting lipid (cholesterol) lab work with next blood draw     2) Work on a low sodium diet daily, elevate legs and wear above knee compression stockings as needed for leg swelling    3) Use all inhalers and nebulizer treatments as instructed per Dr. Perez and update him if these symptoms do not resolve.

## 2025-05-29 DIAGNOSIS — E03.9 ACQUIRED HYPOTHYROIDISM: ICD-10-CM

## 2025-05-29 RX ORDER — DILTIAZEM HYDROCHLORIDE 180 MG/1
180 CAPSULE, EXTENDED RELEASE ORAL DAILY
Qty: 90 CAPSULE | Refills: 0 | Status: SHIPPED | OUTPATIENT
Start: 2025-05-29

## 2025-05-29 RX ORDER — LEVOTHYROXINE SODIUM 88 UG/1
88 TABLET ORAL DAILY
Qty: 90 TABLET | Refills: 0 | Status: SHIPPED | OUTPATIENT
Start: 2025-05-29

## 2025-06-10 DIAGNOSIS — F32.A ANXIETY AND DEPRESSION: Primary | ICD-10-CM

## 2025-06-10 DIAGNOSIS — F41.9 ANXIETY AND DEPRESSION: Primary | ICD-10-CM

## 2025-06-10 RX ORDER — ALPRAZOLAM 0.25 MG
0.25 TABLET ORAL 2 TIMES DAILY PRN
Qty: 60 TABLET | Refills: 1 | Status: SHIPPED | OUTPATIENT
Start: 2025-06-10 | End: 2025-08-09

## 2025-06-12 RX ORDER — ALBUTEROL SULFATE 90 UG/1
2 INHALANT RESPIRATORY (INHALATION) EVERY 4 HOURS PRN
Qty: 3 EACH | Refills: 1 | Status: SHIPPED | OUTPATIENT
Start: 2025-06-12

## 2025-06-18 DIAGNOSIS — F51.01 PRIMARY INSOMNIA: ICD-10-CM

## 2025-06-18 RX ORDER — ZOLPIDEM TARTRATE 10 MG/1
10 TABLET ORAL NIGHTLY PRN
Qty: 90 TABLET | Refills: 0 | Status: SHIPPED | OUTPATIENT
Start: 2025-06-18 | End: 2025-09-16

## 2025-07-22 ENCOUNTER — OFFICE VISIT (OUTPATIENT)
Dept: FAMILY MEDICINE CLINIC | Age: 75
End: 2025-07-22

## 2025-07-22 VITALS
TEMPERATURE: 97.7 F | BODY MASS INDEX: 39.69 KG/M2 | HEIGHT: 61 IN | WEIGHT: 210.2 LBS | HEART RATE: 59 BPM | SYSTOLIC BLOOD PRESSURE: 115 MMHG | OXYGEN SATURATION: 97 % | DIASTOLIC BLOOD PRESSURE: 80 MMHG

## 2025-07-22 DIAGNOSIS — E89.0 POSTABLATIVE HYPOTHYROIDISM: ICD-10-CM

## 2025-07-22 DIAGNOSIS — F51.04 CHRONIC INSOMNIA: ICD-10-CM

## 2025-07-22 DIAGNOSIS — D86.9 SARCOID: ICD-10-CM

## 2025-07-22 DIAGNOSIS — R73.03 PRE-DIABETES: ICD-10-CM

## 2025-07-22 DIAGNOSIS — F32.A ANXIETY AND DEPRESSION: ICD-10-CM

## 2025-07-22 DIAGNOSIS — E66.812 CLASS 2 SEVERE OBESITY DUE TO EXCESS CALORIES WITH SERIOUS COMORBIDITY AND BODY MASS INDEX (BMI) OF 39.0 TO 39.9 IN ADULT (HCC): ICD-10-CM

## 2025-07-22 DIAGNOSIS — J44.9 COPD, SEVERE (HCC): ICD-10-CM

## 2025-07-22 DIAGNOSIS — I47.20 VT (VENTRICULAR TACHYCARDIA) (HCC): ICD-10-CM

## 2025-07-22 DIAGNOSIS — I46.9 CARDIAC ARREST (HCC): ICD-10-CM

## 2025-07-22 DIAGNOSIS — J18.9 RECURRENT PNEUMONIA: ICD-10-CM

## 2025-07-22 DIAGNOSIS — Z00.00 MEDICARE ANNUAL WELLNESS VISIT, SUBSEQUENT: Primary | ICD-10-CM

## 2025-07-22 DIAGNOSIS — I10 ESSENTIAL HYPERTENSION: ICD-10-CM

## 2025-07-22 DIAGNOSIS — D12.6 ADENOMATOUS POLYP OF COLON, UNSPECIFIED PART OF COLON: ICD-10-CM

## 2025-07-22 DIAGNOSIS — F41.9 ANXIETY AND DEPRESSION: ICD-10-CM

## 2025-07-22 DIAGNOSIS — M81.0 AGE-RELATED OSTEOPOROSIS WITHOUT CURRENT PATHOLOGICAL FRACTURE: ICD-10-CM

## 2025-07-22 DIAGNOSIS — C34.11 MALIGNANT NEOPLASM OF UPPER LOBE OF RIGHT LUNG (HCC): ICD-10-CM

## 2025-07-22 DIAGNOSIS — S81.812A NONINFECTED SKIN TEAR OF LEFT LOWER EXTREMITY, INITIAL ENCOUNTER: ICD-10-CM

## 2025-07-22 DIAGNOSIS — E66.01 CLASS 2 SEVERE OBESITY DUE TO EXCESS CALORIES WITH SERIOUS COMORBIDITY AND BODY MASS INDEX (BMI) OF 39.0 TO 39.9 IN ADULT (HCC): ICD-10-CM

## 2025-07-22 DIAGNOSIS — E78.00 HYPERCHOLESTEREMIA: ICD-10-CM

## 2025-07-22 RX ORDER — CYCLOBENZAPRINE HCL 10 MG
10 TABLET ORAL 3 TIMES DAILY PRN
Qty: 90 TABLET | Refills: 1 | Status: SHIPPED | OUTPATIENT
Start: 2025-07-22

## 2025-07-22 RX ORDER — ALPRAZOLAM 0.25 MG
0.25 TABLET ORAL 2 TIMES DAILY PRN
Qty: 180 TABLET | Refills: 0 | Status: SHIPPED | OUTPATIENT
Start: 2025-07-22 | End: 2025-10-20

## 2025-07-22 RX ORDER — TEZEPELUMAB-EKKO 210 MG/1.9ML
210 INJECTION, SOLUTION SUBCUTANEOUS
Qty: 1 EACH | Refills: 3
Start: 2025-07-22

## 2025-07-22 ASSESSMENT — PATIENT HEALTH QUESTIONNAIRE - PHQ9
1. LITTLE INTEREST OR PLEASURE IN DOING THINGS: NOT AT ALL
7. TROUBLE CONCENTRATING ON THINGS, SUCH AS READING THE NEWSPAPER OR WATCHING TELEVISION: NOT AT ALL
4. FEELING TIRED OR HAVING LITTLE ENERGY: NOT AT ALL
3. TROUBLE FALLING OR STAYING ASLEEP: NOT AT ALL
2. FEELING DOWN, DEPRESSED OR HOPELESS: NOT AT ALL
9. THOUGHTS THAT YOU WOULD BE BETTER OFF DEAD, OR OF HURTING YOURSELF: NOT AT ALL
SUM OF ALL RESPONSES TO PHQ QUESTIONS 1-9: 0
SUM OF ALL RESPONSES TO PHQ QUESTIONS 1-9: 0
8. MOVING OR SPEAKING SO SLOWLY THAT OTHER PEOPLE COULD HAVE NOTICED. OR THE OPPOSITE, BEING SO FIGETY OR RESTLESS THAT YOU HAVE BEEN MOVING AROUND A LOT MORE THAN USUAL: NOT AT ALL
SUM OF ALL RESPONSES TO PHQ QUESTIONS 1-9: 0
6. FEELING BAD ABOUT YOURSELF - OR THAT YOU ARE A FAILURE OR HAVE LET YOURSELF OR YOUR FAMILY DOWN: NOT AT ALL
5. POOR APPETITE OR OVEREATING: NOT AT ALL
SUM OF ALL RESPONSES TO PHQ QUESTIONS 1-9: 0
10. IF YOU CHECKED OFF ANY PROBLEMS, HOW DIFFICULT HAVE THESE PROBLEMS MADE IT FOR YOU TO DO YOUR WORK, TAKE CARE OF THINGS AT HOME, OR GET ALONG WITH OTHER PEOPLE: NOT DIFFICULT AT ALL

## 2025-07-22 ASSESSMENT — ENCOUNTER SYMPTOMS
BLOOD IN STOOL: 0
SHORTNESS OF BREATH: 1
COUGH: 0
ABDOMINAL PAIN: 0
SINUS PRESSURE: 0
ROS SKIN COMMENTS: NO CONCERNING SKIN LESIONS
TROUBLE SWALLOWING: 0
DIARRHEA: 0
CONSTIPATION: 0
NAUSEA: 0
COLOR CHANGE: 1
VOMITING: 0
BACK PAIN: 1

## 2025-07-22 ASSESSMENT — LIFESTYLE VARIABLES
HOW OFTEN DO YOU HAVE A DRINK CONTAINING ALCOHOL: NEVER
HOW MANY STANDARD DRINKS CONTAINING ALCOHOL DO YOU HAVE ON A TYPICAL DAY: PATIENT DOES NOT DRINK

## 2025-07-22 NOTE — PROGRESS NOTES
SUBJECTIVE:  Fatuma Spencer is a 74 y.o. female being evaluated for:    Chief Complaint   Patient presents with    Medicare AWV    Hypertension    Left upper antterior leg skin tear     Patient has been cleaning with saline and using dressings and antibiotic ointment that patient had previously from wound clinic. Patient states the pain is 3 out of 10 on the VAS.        HPI   On Saturday skinned her left upper thigh on car door getting in  and has large  tear  NO drainge and redness   using med from wound clinic  non stick dressing and covering     Breathing is doing pretty Lung cancer has remained stable Treated with 5 tratments of  XRT  Was having frequent bouts of pneumonia with hospitalizations  Dr Batres has discontinued her immune meds and breathing has been better  On Trezspire infusions     Putting on so much weight every week another 2 to 3 pounds     Sarcoid right eye is bothing her  sees Dr Valentin every 3 to 4 months  off the MTX and Remicaid as it was making  immune system too low with recurrent pneumonia  Seeing Dr Zamudio in August or September      Allergies   Allergen Reactions    Adhesive Tape Other (See Comments)     ALL TAPES PEELS HER SKIN OFF    Atarax [Hydroxyzine] Hives    Codeine Other (See Comments)     Makes her feel hyper    Sulfa Antibiotics Nausea Only     Current Outpatient Medications   Medication Sig Dispense Refill    ALPRAZolam (XANAX) 0.25 MG tablet Take 1 tablet by mouth 2 times daily as needed for Anxiety for up to 90 days. Max Daily Amount: 0.5 mg 180 tablet 0    cyclobenzaprine (FLEXERIL) 10 MG tablet Take 1 tablet by mouth 3 times daily as needed for Muscle spasms (back pain and spasm) 90 tablet 1    tezepelumab-ekko (TEZSPIRE) 210 MG/1.91ML SOSY injection Inject 1.91 mLs into the skin every 28 days 1 each 3    Semaglutide,0.25 or 0.5MG/DOS, 2 MG/3ML SOPN Inject 0.25 mg into the skin every 7 days 3 mL 0    zolpidem (AMBIEN) 10 MG tablet Take 1 tablet by mouth nightly as needed

## 2025-07-22 NOTE — ASSESSMENT & PLAN NOTE
Low carb calorie diet to try to get ozempic covered with her heart disease     Orders:    Semaglutide,0.25 or 0.5MG/DOS, 2 MG/3ML SOPN; Inject 0.25 mg into the skin every 7 days

## 2025-07-22 NOTE — ASSESSMENT & PLAN NOTE
Vtach with AICD Defibrillator  follows with Dr Maciel     Orders:    Semaglutide,0.25 or 0.5MG/DOS, 2 MG/3ML SOPN; Inject 0.25 mg into the skin every 7 days

## 2025-07-22 NOTE — ASSESSMENT & PLAN NOTE
Aicd and defibrillator in following with Dr Maciel     Orders:    Semaglutide,0.25 or 0.5MG/DOS, 2 MG/3ML SOPN; Inject 0.25 mg into the skin every 7 days

## 2025-07-22 NOTE — PROGRESS NOTES
Medicare Annual Wellness Visit    Fatuma Spencer is here for Medicare AWV, Hypertension, and Left upper antterior leg skin tear (Patient has been cleaning with saline and using dressings and antibiotic ointment that patient had previously from wound clinic. Patient states the pain is 3 out of 10 on the VAS. )    Assessment & Plan   Medicare annual wellness visit, subsequent     No follow-ups on file.     Subjective   Medicare AWV    Patient's complete Health Risk Assessment and screening values have been reviewed and are found in Flowsheets. The following problems were reviewed today and where indicated follow up appointments were made and/or referrals ordered.    Positive Risk Factor Screenings with Interventions:              Inactivity:  On average, how many days per week do you engage in moderate to strenuous exercise (like a brisk walk)?: 0 days (Patient is active around the house doing household chores.) (!) Abnormal  On average, how many minutes do you engage in exercise at this level?: 0 min  Interventions:  See AVS for additional education material     Abnormal BMI (obese):  Body mass index is 39.74 kg/m². (!) Abnormal  Interventions:  See AVS for additional education material        Dentist Screen:  Have you seen the dentist within the past year?: (!) No (Patient encouraged to follow up with a new provider.)    Intervention:  See AVS for additional education material                       Objective   Vitals:    07/22/25 1327   BP: 115/80   BP Site: Left Upper Arm   Patient Position: Sitting   BP Cuff Size: Medium Adult   Pulse: 59   Temp: 97.7 °F (36.5 °C)   SpO2: 97%   Weight: 95.3 kg (210 lb 3.2 oz)   Height: 1.549 m (5' 0.98\")      Body mass index is 39.74 kg/m².                    Allergies   Allergen Reactions    Adhesive Tape Other (See Comments)     ALL TAPES PEELS HER SKIN OFF    Atarax [Hydroxyzine] Hives    Diphenhydramine Other (See Comments)    Codeine Other (See Comments)     Makes her feel

## 2025-07-29 ENCOUNTER — TELEPHONE (OUTPATIENT)
Dept: FAMILY MEDICINE CLINIC | Age: 75
End: 2025-07-29

## 2025-07-29 ENCOUNTER — TELEPHONE (OUTPATIENT)
Dept: ADMINISTRATIVE | Age: 75
End: 2025-07-29

## 2025-07-29 DIAGNOSIS — E03.9 ACQUIRED HYPOTHYROIDISM: Primary | ICD-10-CM

## 2025-07-29 RX ORDER — LEVOTHYROXINE SODIUM 112 UG/1
112 TABLET ORAL DAILY
Qty: 90 TABLET | Refills: 1 | Status: SHIPPED | OUTPATIENT
Start: 2025-07-29

## 2025-07-29 NOTE — TELEPHONE ENCOUNTER
Submitted PA for Wegovy 0.25MG/0.5ML auto-injectors  Via CMM Key: [id number] STATUS: message from plan     Information regarding your request  PA- case has been cancelled for <*drug*>, use as directed, for the following reason: Patient does not have pharmacy benefits with [place] Medicaid. Please have the patient contact member services for further assistance. .    Please reach out to patient and verify pharmacy insurance coverage. Submitted with information under verify pharmacy benefits with member ID [id number]     If this requires a response please respond to the pool ( P [facility] MEDICATION PRE-AUTH).      Thank you please advise patient.

## 2025-07-29 NOTE — TELEPHONE ENCOUNTER
Reviewed labs from [facility] total cholesterol 214 HDL is good at 84 LDL is a little high at 115.  Free T4 is fine for TSH is high suggesting low thyroid.  Fatigue I would increase your dose.  CRP is a little high it is not terrible question if this could be your sarcoid has not been treated normal kidney function liver function.  Normal blood count.  Hemoglobin A1c was good at 5.6.  I would increase her levothyroxine dose to 112 and recheck in 3 months.  I would follow-up with your rheumatologist Dr. Linton.  Question if you are able to get the Ozempic covered

## 2025-07-29 NOTE — TELEPHONE ENCOUNTER
Called pt who stated that pharmacy ([facility]) called her and told her everything is ok with medication.    Pt states she does have [place] Medicaid and ID number is correct.

## 2025-07-29 NOTE — TELEPHONE ENCOUNTER
Pt is requesting the results of her lab work that she got done at [facility]. They are under Labs in chart.    Please advise.

## 2025-07-30 NOTE — TELEPHONE ENCOUNTER
Pt advised and verbalized understanding.  Pt states that her insurance/pharmacy said that Ozempic is covered and that it should be in the mail to be delivered.  Advised for pt to call pharmacy to check status of it.  Pt gave verbal understanding.

## 2025-08-11 RX ORDER — DILTIAZEM HYDROCHLORIDE 180 MG/1
180 CAPSULE, EXTENDED RELEASE ORAL DAILY
Qty: 90 CAPSULE | Refills: 0 | Status: SHIPPED | OUTPATIENT
Start: 2025-08-11

## 2025-08-25 DIAGNOSIS — E66.01 CLASS 2 SEVERE OBESITY DUE TO EXCESS CALORIES WITH SERIOUS COMORBIDITY AND BODY MASS INDEX (BMI) OF 39.0 TO 39.9 IN ADULT (HCC): ICD-10-CM

## 2025-08-25 DIAGNOSIS — J44.9 OVERLAP SYNDROME OF OBSTRUCTIVE SLEEP APNEA AND CHRONIC OBSTRUCTIVE PULMONARY DISEASE (HCC): Primary | ICD-10-CM

## 2025-08-25 DIAGNOSIS — G47.33 OBSTRUCTIVE SLEEP APNEA: ICD-10-CM

## 2025-08-25 DIAGNOSIS — G47.33 OVERLAP SYNDROME OF OBSTRUCTIVE SLEEP APNEA AND CHRONIC OBSTRUCTIVE PULMONARY DISEASE (HCC): Primary | ICD-10-CM

## 2025-08-25 DIAGNOSIS — E66.812 CLASS 2 SEVERE OBESITY DUE TO EXCESS CALORIES WITH SERIOUS COMORBIDITY AND BODY MASS INDEX (BMI) OF 39.0 TO 39.9 IN ADULT (HCC): ICD-10-CM

## 2025-08-25 RX ORDER — DESVENLAFAXINE 50 MG/1
50 TABLET, FILM COATED, EXTENDED RELEASE ORAL DAILY
Qty: 90 TABLET | Refills: 1 | Status: SHIPPED | OUTPATIENT
Start: 2025-08-25

## 2025-09-04 RX ORDER — FUROSEMIDE 20 MG/1
20 TABLET ORAL DAILY
Qty: 90 TABLET | Refills: 0 | Status: SHIPPED | OUTPATIENT
Start: 2025-09-04